# Patient Record
Sex: FEMALE | Race: WHITE | NOT HISPANIC OR LATINO | Employment: OTHER | URBAN - METROPOLITAN AREA
[De-identification: names, ages, dates, MRNs, and addresses within clinical notes are randomized per-mention and may not be internally consistent; named-entity substitution may affect disease eponyms.]

---

## 2017-04-28 ENCOUNTER — OFFICE VISIT (OUTPATIENT)
Dept: CARDIOLOGY | Facility: CLINIC | Age: 82
End: 2017-04-28

## 2017-04-28 VITALS
HEART RATE: 69 BPM | WEIGHT: 107 LBS | DIASTOLIC BLOOD PRESSURE: 94 MMHG | SYSTOLIC BLOOD PRESSURE: 123 MMHG | HEIGHT: 64 IN | BODY MASS INDEX: 18.27 KG/M2

## 2017-04-28 DIAGNOSIS — I25.9 IHD (ISCHEMIC HEART DISEASE): Primary | ICD-10-CM

## 2017-04-28 DIAGNOSIS — K31.84 GASTROPARESIS: ICD-10-CM

## 2017-04-28 DIAGNOSIS — R55 SYNCOPE, UNSPECIFIED SYNCOPE TYPE: ICD-10-CM

## 2017-04-28 DIAGNOSIS — I71.21 ASCENDING AORTIC ANEURYSM (HCC): ICD-10-CM

## 2017-04-28 DIAGNOSIS — K58.9 IRRITABLE BOWEL SYNDROME, UNSPECIFIED TYPE: ICD-10-CM

## 2017-04-28 DIAGNOSIS — I10 ESSENTIAL HYPERTENSION: ICD-10-CM

## 2017-04-28 DIAGNOSIS — E78.5 DYSLIPIDEMIA: ICD-10-CM

## 2017-04-28 PROCEDURE — 99213 OFFICE O/P EST LOW 20 MIN: CPT | Performed by: INTERNAL MEDICINE

## 2017-04-28 RX ORDER — CARVEDILOL 12.5 MG/1
12.5 TABLET ORAL 2 TIMES DAILY
Refills: 0 | COMMUNITY
Start: 2017-04-21 | End: 2022-11-11

## 2017-04-28 RX ORDER — FAMOTIDINE 40 MG/1
40 TABLET, FILM COATED ORAL DAILY
Refills: 0 | COMMUNITY
Start: 2017-04-24 | End: 2019-03-14

## 2017-04-28 RX ORDER — ROSUVASTATIN CALCIUM 10 MG/1
10 TABLET, COATED ORAL DAILY
Qty: 90 TABLET | Refills: 3 | Status: SHIPPED | OUTPATIENT
Start: 2017-04-28 | End: 2019-03-14
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
## 2017-08-15 ENCOUNTER — APPOINTMENT (OUTPATIENT)
Dept: CT IMAGING | Facility: HOSPITAL | Age: 82
End: 2017-08-15

## 2017-08-15 ENCOUNTER — HOSPITAL ENCOUNTER (EMERGENCY)
Facility: HOSPITAL | Age: 82
Discharge: HOME | End: 2017-08-15
Attending: EMERGENCY MEDICINE | Admitting: EMERGENCY MEDICINE

## 2017-08-15 ENCOUNTER — APPOINTMENT (OUTPATIENT)
Dept: GENERAL RADIOLOGY | Facility: HOSPITAL | Age: 82
End: 2017-08-15

## 2017-08-15 VITALS
BODY MASS INDEX: 17.75 KG/M2 | HEIGHT: 64 IN | HEART RATE: 68 BPM | DIASTOLIC BLOOD PRESSURE: 92 MMHG | OXYGEN SATURATION: 96 % | TEMPERATURE: 98 F | WEIGHT: 104 LBS | SYSTOLIC BLOOD PRESSURE: 156 MMHG | RESPIRATION RATE: 18 BRPM

## 2017-08-15 DIAGNOSIS — S09.90XA HEAD INJURY, INITIAL ENCOUNTER: ICD-10-CM

## 2017-08-15 DIAGNOSIS — S39.013A STRAIN OF PELVIS, INITIAL ENCOUNTER: Primary | ICD-10-CM

## 2017-08-15 LAB
ALBUMIN SERPL-MCNC: 3.9 G/DL (ref 3.4–4.8)
ALBUMIN/GLOB SERPL: 1.4 G/DL (ref 1.5–2.5)
ALP SERPL-CCNC: 86 U/L (ref 35–104)
ALT SERPL W P-5'-P-CCNC: 18 U/L (ref 10–36)
ANION GAP SERPL CALCULATED.3IONS-SCNC: 7.3 MMOL/L (ref 3.6–11.2)
AST SERPL-CCNC: 24 U/L (ref 10–30)
BASOPHILS # BLD AUTO: 0.03 10*3/MM3 (ref 0–0.3)
BASOPHILS NFR BLD AUTO: 0.4 % (ref 0–2)
BILIRUB SERPL-MCNC: 0.8 MG/DL (ref 0.2–1.8)
BUN BLD-MCNC: 16 MG/DL (ref 7–21)
BUN/CREAT SERPL: 22.9 (ref 7–25)
CALCIUM SPEC-SCNC: 8.9 MG/DL (ref 7.7–10)
CHLORIDE SERPL-SCNC: 104 MMOL/L (ref 99–112)
CO2 SERPL-SCNC: 23.7 MMOL/L (ref 24.3–31.9)
CREAT BLD-MCNC: 0.7 MG/DL (ref 0.43–1.29)
DEPRECATED RDW RBC AUTO: 48.2 FL (ref 37–54)
EOSINOPHIL # BLD AUTO: 0.27 10*3/MM3 (ref 0–0.7)
EOSINOPHIL NFR BLD AUTO: 3.8 % (ref 0–7)
ERYTHROCYTE [DISTWIDTH] IN BLOOD BY AUTOMATED COUNT: 14.7 % (ref 11.5–14.5)
GFR SERPL CREATININE-BSD FRML MDRD: 79 ML/MIN/1.73
GLOBULIN UR ELPH-MCNC: 2.7 GM/DL
GLUCOSE BLD-MCNC: 90 MG/DL (ref 70–110)
HCT VFR BLD AUTO: 36 % (ref 37–47)
HGB BLD-MCNC: 11.8 G/DL (ref 12–16)
IMM GRANULOCYTES # BLD: 0.04 10*3/MM3 (ref 0–0.03)
IMM GRANULOCYTES NFR BLD: 0.6 % (ref 0–0.5)
LYMPHOCYTES # BLD AUTO: 1.74 10*3/MM3 (ref 1–3)
LYMPHOCYTES NFR BLD AUTO: 24.4 % (ref 16–46)
MCH RBC QN AUTO: 30.4 PG (ref 27–33)
MCHC RBC AUTO-ENTMCNC: 32.8 G/DL (ref 33–37)
MCV RBC AUTO: 92.8 FL (ref 80–94)
MONOCYTES # BLD AUTO: 0.89 10*3/MM3 (ref 0.1–0.9)
MONOCYTES NFR BLD AUTO: 12.5 % (ref 0–12)
NEUTROPHILS # BLD AUTO: 4.16 10*3/MM3 (ref 1.4–6.5)
NEUTROPHILS NFR BLD AUTO: 58.3 % (ref 40–75)
OSMOLALITY SERPL CALC.SUM OF ELEC: 270.8 MOSM/KG (ref 273–305)
PLATELET # BLD AUTO: 249 10*3/MM3 (ref 130–400)
PMV BLD AUTO: 9.5 FL (ref 6–10)
POTASSIUM BLD-SCNC: 3.8 MMOL/L (ref 3.5–5.3)
PROT SERPL-MCNC: 6.6 G/DL (ref 6–8)
RBC # BLD AUTO: 3.88 10*6/MM3 (ref 4.2–5.4)
SODIUM BLD-SCNC: 135 MMOL/L (ref 135–153)
WBC NRBC COR # BLD: 7.13 10*3/MM3 (ref 4.5–12.5)

## 2017-08-15 PROCEDURE — 80053 COMPREHEN METABOLIC PANEL: CPT | Performed by: EMERGENCY MEDICINE

## 2017-08-15 PROCEDURE — 72125 CT NECK SPINE W/O DYE: CPT

## 2017-08-15 PROCEDURE — 99283 EMERGENCY DEPT VISIT LOW MDM: CPT

## 2017-08-15 PROCEDURE — 73502 X-RAY EXAM HIP UNI 2-3 VIEWS: CPT | Performed by: RADIOLOGY

## 2017-08-15 PROCEDURE — 72125 CT NECK SPINE W/O DYE: CPT | Performed by: RADIOLOGY

## 2017-08-15 PROCEDURE — 85025 COMPLETE CBC W/AUTO DIFF WBC: CPT | Performed by: EMERGENCY MEDICINE

## 2017-08-15 PROCEDURE — 70450 CT HEAD/BRAIN W/O DYE: CPT | Performed by: RADIOLOGY

## 2017-08-15 PROCEDURE — 73502 X-RAY EXAM HIP UNI 2-3 VIEWS: CPT

## 2017-08-15 PROCEDURE — 25010000002 ONDANSETRON PER 1 MG: Performed by: EMERGENCY MEDICINE

## 2017-08-15 PROCEDURE — 96374 THER/PROPH/DIAG INJ IV PUSH: CPT

## 2017-08-15 PROCEDURE — 70450 CT HEAD/BRAIN W/O DYE: CPT

## 2017-08-15 RX ORDER — ONDANSETRON 2 MG/ML
4 INJECTION INTRAMUSCULAR; INTRAVENOUS ONCE
Status: COMPLETED | OUTPATIENT
Start: 2017-08-15 | End: 2017-08-15

## 2017-08-15 RX ORDER — ONDANSETRON 4 MG/1
4 TABLET, ORALLY DISINTEGRATING ORAL ONCE
Status: DISCONTINUED | OUTPATIENT
Start: 2017-08-15 | End: 2017-08-15

## 2017-08-15 RX ADMIN — ONDANSETRON 4 MG: 2 INJECTION, SOLUTION INTRAMUSCULAR; INTRAVENOUS at 22:25

## 2017-08-16 NOTE — ED NOTES
Patient is leaving ED with patient's family at this time. Patient has no further needs or questions at discharge, patient verbalizes understanding of discharge instructions and importance of follow up care. Patient is alert and oriented x4, respirations are regular and unlabored, NADN.     Garett Narvaez RN  08/15/17 [id number]

## 2017-08-16 NOTE — ED PROVIDER NOTES
Subjective   Patient is a 87 y.o. female presenting with fall.   History provided by:  Patient  Fall   Mechanism of injury: fall    Injury location:  Pelvis  Pelvic injury location:  Pelvis and groin  Time since incident:  30 minutes  Fall:     Fall occurred:  Standing    Impact surface:  Hard floor  Prior to arrival data:     Bystander interventions:  None    Patient ambulatory at scene: no      Blood loss:  None  Associated symptoms: headaches    Associated symptoms: no abdominal pain and no chest pain        Review of Systems   Constitutional: Negative.  Negative for fever.   Respiratory: Negative.    Cardiovascular: Negative.  Negative for chest pain.   Gastrointestinal: Negative.  Negative for abdominal pain.   Endocrine: Negative.    Genitourinary: Negative.  Negative for dysuria.   Musculoskeletal:        Pain pelvis, right hip, neck pain right   Skin: Negative.    Neurological: Positive for headaches.   Psychiatric/Behavioral: Negative.    All other systems reviewed and are negative.      Past Medical History:   Diagnosis Date   • Arthritis    • Ascending aortic aneurysm 10/6/2016   • Chronic hypokalemia 10/6/2016   • Colonic polyp 10/6/2016   • Dementia    • Dyslipidemia 10/6/2016   • Gastroparesis 10/6/2016   • GERD with esophagitis 10/6/2016   • Hypertension 10/6/2016   • IBS (irritable bowel syndrome) 10/6/2016   • IHD (ischemic heart disease) 10/6/2016   • Osteoarthritis 10/6/2016   • Osteoarthritis of left hip 10/6/2016   • Osteoporosis 10/6/2016   • Parkinson's disease 10/6/2016   • Peripheral edema 10/6/2016   • Syncope 10/6/2016       Allergies   Allergen Reactions   • Penicillins Anaphylaxis   • Reglan [Metoclopramide] Anaphylaxis   • Sulfa Antibiotics Anaphylaxis   • Nexium [Esomeprazole Magnesium]        Past Surgical History:   Procedure Laterality Date   • APPENDECTOMY      As a child   • CATARACT EXTRACTION WITH INTRAOCULAR LENS IMPLANT Bilateral     2006,2008   • CORONARY ANGIOPLASTY WITH  STENT PLACEMENT  2009   • CORONARY ARTERY BYPASS GRAFT  2000    x3   • GASTRECTOMY PARTIAL / TOTAL      1970s   • LAPAROSCOPIC CHOLECYSTECTOMY  2003   • SPLENECTOMY  2007   • TONSILLECTOMY AND ADENOIDECTOMY      As a child   • TOTAL ABDOMINAL HYSTERECTOMY WITH SALPINGO OOPHORECTOMY  1973       Family History   Problem Relation Age of Onset   • Tuberculosis Mother    • No Known Problems Father        Social History     Social History   • Marital status:      Spouse name: N/A   • Number of children: N/A   • Years of education: N/A     Social History Main Topics   • Smoking status: Former Smoker     Types: Cigarettes     Quit date: 4/28/1992   • Smokeless tobacco: Never Used   • Alcohol use No   • Drug use: No   • Sexual activity: Defer     Other Topics Concern   • None     Social History Narrative           Objective   Physical Exam   Constitutional: She is oriented to person, place, and time. She appears well-developed and well-nourished. No distress.   HENT:   Head: Normocephalic and atraumatic.   Right Ear: External ear normal.   Left Ear: External ear normal.   Nose: Nose normal.   Eyes: Conjunctivae and EOM are normal. Pupils are equal, round, and reactive to light.   Neck: Normal range of motion. Neck supple. No JVD present. No tracheal deviation present.   Cardiovascular: Normal rate, regular rhythm and normal heart sounds.    No murmur heard.  Pulmonary/Chest: Effort normal and breath sounds normal. No respiratory distress. She has no wheezes.   Abdominal: Soft. Bowel sounds are normal. There is no tenderness.   Musculoskeletal: Normal range of motion. She exhibits no edema or deformity.   Tender right hip, tender symphysis pubis, tender right parietal scalp, neck   Neurological: She is alert and oriented to person, place, and time. No cranial nerve deficit.   Skin: Skin is warm and dry. No rash noted. She is not diaphoretic. No erythema. No pallor.   Psychiatric: She has a normal mood and affect. Her  behavior is normal. Thought content normal.   Nursing note and vitals reviewed.      Procedures         ED Course  ED Course   Comment By Time   Ct head negative, CT c spine no fx Celestino Ram MD 08/15 2242                  [facility]    Final diagnoses:   Strain of pelvis, initial encounter   Head injury, initial encounter            Celestino Ram MD  08/15/17 [id number]

## 2017-11-10 ENCOUNTER — OFFICE VISIT (OUTPATIENT)
Dept: CARDIOLOGY | Facility: CLINIC | Age: 82
End: 2017-11-10

## 2017-11-10 VITALS
SYSTOLIC BLOOD PRESSURE: 138 MMHG | WEIGHT: 114 LBS | HEART RATE: 77 BPM | DIASTOLIC BLOOD PRESSURE: 72 MMHG | HEIGHT: 64 IN | BODY MASS INDEX: 19.46 KG/M2

## 2017-11-10 DIAGNOSIS — I10 ESSENTIAL HYPERTENSION: ICD-10-CM

## 2017-11-10 DIAGNOSIS — E78.2 MIXED HYPERLIPIDEMIA: ICD-10-CM

## 2017-11-10 DIAGNOSIS — I25.9 IHD (ISCHEMIC HEART DISEASE): Primary | ICD-10-CM

## 2017-11-10 PROCEDURE — 99214 OFFICE O/P EST MOD 30 MIN: CPT | Performed by: INTERNAL MEDICINE

## 2017-11-10 RX ORDER — NAPROXEN SODIUM 220 MG
220 TABLET ORAL ONCE AS NEEDED
COMMUNITY
End: 2017-11-19 | Stop reason: HOSPADM

## 2017-11-10 RX ORDER — ISOSORBIDE MONONITRATE 30 MG/1
30 TABLET, EXTENDED RELEASE ORAL DAILY
Qty: 30 TABLET | Refills: 11 | Status: ON HOLD | OUTPATIENT
Start: 2017-11-10 | End: 2017-11-19
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
## 2017-11-17 NOTE — NURSING NOTE
ACC REVIEW REPORT: [facility]        PATIENT NAME: Marilyn Sood    PATIENT ID: [id number]    BED: N614    BED TYPE: Tele    BED GIVEN TO: Adali    TOÑO BED GIVEN: 1817    YOB: 1930    AGE: 88 yo    GENDER: Female    PREVIOUS ADMIT TO Located within [facility]:     PREVIOUS ADMISSION DATE:     PATIENT CLASS:     TODAY'S DATE: 11/17/2017    TRANSFER DATE: 11/17/2017    ETA:     TRANSFERRING FACILITY: [facility]    TRANSFERRING FACILITY PHONE # : [phone]    TRANSFERRING MD:     DATE/TIME REQUEST RECEIVED: 11/17/2017 at 5963    Located within [facility] RN: Ana Cristina Gunderson RN     REPORT FROM: Adali Brooke RN    TIME REPORT TAKEN: 1821    DIAGNOSIS: Chest Pain    REASON FOR TRANSFER TO Located within [facility]: Higher Level of Care    TRANSPORTATION: Ambulance    CLINICAL REASON FOR TRANSFER TO Located within [facility]: Patient presents to local ED with complaints of intermittent chest pain that has progressively gotten worse.  Also complained of nausea and SOA.       CLINICAL INFORMATION    HEIGHT:     WEIGHT:     ALLERGIES: Reglan, PCN, Sulfa    PLUMMER:     INFECTIOUS DISEASE: None    ISOLATION:     1ST VITAL SIGNS:   TIME:   TEMP:   PULSE:   B/P:   RESP:     LAST VITAL SIGNS:  TIME:   TEMP: Afebrile  PULSE: 62  B/P: 146/87  RESP: 18    LAB INFORMATION: H/H-10.9/33.1, BUN-14, Crea-0.5, Na+-133, WBC-5.3, CPK-64    CULTURE INFORMATION:     MEDS/IV FLUIDS: See MAR sent with patient. Has a #20 RFA-NS at 75 ml/hr. Received Nitro, Asa, 1 inch Nitro paste      CARDIAC SYSTEM:    CHEST PAIN: Yes. Patient wont describe the pain    RATE:     SCALE:     RHYTHM: SR    Is patient taking or has patient been given any drugs that could increase bleeding? Yes  (Plavix, Brilinta, Effient, Eliquis, Xarelto, Warfarin, Integrilin, Angiomax)    DRUG: Plavix     DOSE/FREQUENCY: 75 mg daily    CARDIAC ENZYMES:    DATE:   TIME:   CK:   CKMB:   QUAN:   TROP:     DATE: 11/17/2017  TIME:   CK:   CKMB: 1.37  QUAN:   TROP: <0.3      HEART CATH:     HEART CATH DATE:     HEART CATH RESULTS:      LAD:   RCA:   CX:   LMAIN:   EF:     SWAN:     SITE:   SIZE:    DATE INSERTED:     ARTLINE:     SITE:   SIZE:   DATE INSERTED:     SHEATH:    SITE:   SIZE:   DATE INSERTED:         VASOSEAL:    SITE:   DATE INSERTED:     EXTERNAL PACEMAKER:     RATE:   EXT PACER ON:     MODE:    DATE INSERTED:   OUTPUT SETTING:   SENSITIVITY SETTING:   SENSITIVITY TYPE:     IABP:    SITE:   AUG PRESSURE:   DATE INSERTED:     CARDIAC NOTES:       RESPIRATORY SYSTEM:    LUNG SOUNDS:    CLEAR: Yes  CRACKLES:   WHEEZES:   RHONCHI:   DIMINISHED:     ABG DATE:         ABG TIME:     ABG RESULTS:    PH:   PO2:   PCO2:   HCO3:   O2 SAT:       ETT:     ETT SIZE:     ORAL:     NASAL:     SECURED AT MEASUREMENT (CM):     ON VENTILATOR:    TV:   FI02:   RATE:   PEEP:     OXYGEN: 2 liters    O2 SAT: 99%    ADMINISTRATION ROUTE: Nasal cannula    IMAGING FINDINGS:     PNEUMO LOCATION:     PNEUMO SIZE:     PNEUMO CHEST TUBE SEAL TYPE:     RADIOLOGY RESULTS:     RESPIRATORY STATUS: Patient in no acute distress      CNS/MUSCULOSKELETAL    ALERT AND ORIENTED:    PERSON: Yes  PLACE: Yes  TIME: Yes    INJURY:  WHERE:     PALOMA COMA SCALE:    E:   M:   V:     STROKE SCALE:     SIZE OF HEMORRHAGE:     SYMPTOMS: (CHOOSE APPROPRIATE)    ASPHASIA:   ATAXIA:   DYSARTHRIA:   DYSPHASIA:   HEADACHE:   PARALYSIS:   SEIZURE:   SYNCOPE:   VERTIGO:   VISION CHANGE:        EXTREMITY WEAKNESS:    LEFT ARM:   RIGHT ARM:   LEFT LEG:   RIGHT LEG:     CAT SCAN RESULTS:     MRI RESULTS:     CNS/MUSCULOSKELETAL NOTES: Patient ambulatory. Has mild confusion. Patient states she fell a couple of weeks ago fracturing her pelvis.      GI//GY      ABDOMINAL PAIN:     VOMITING:     DIARRHEA:     NAUSEA:     BOWEL SOUNDS:     OCCULT STOOL:     VAGINAL BLEEDING:     TESTICULAR PAIN:     HEMATURIA:     NG TUBE:    SIZE:   DATE INSERTED:       ULTRASOUND:     ULTRASOUND RESULTS:      ACUTE ABDOMEN:     ACUTE ABDOMEN RESULTS:       CT SCAN:     CT SCAN RESULTS:       GI//GY  NOTES:     PAST MEDICAL HISTORY: Depression, CAD, HTN, high cholesterol, hypothyroidism, IBS, CABG, Cardiac stents, Dementia, DM, Cataract surgery, Reflux, Arthritis, Appy, Hysterectomy, Carpel Tunnel surgery, Spleenectomy, Falls    OTHER SYMPTOM NOTES:     ADDITIONAL NOTES:           Ana Cristina Gunderson RN  11/17/2017  6:41 PM

## 2017-11-18 ENCOUNTER — HOSPITAL ENCOUNTER (OUTPATIENT)
Facility: HOSPITAL | Age: 82
Setting detail: OBSERVATION
Discharge: HOME | End: 2017-11-19
Attending: INTERNAL MEDICINE | Admitting: INTERNAL MEDICINE

## 2017-11-18 PROBLEM — I16.0 HYPERTENSIVE URGENCY: Status: ACTIVE | Noted: 2017-11-18

## 2017-11-18 LAB
ALBUMIN SERPL-MCNC: 3.8 G/DL (ref 3.2–4.8)
ALBUMIN/GLOB SERPL: 1.6 G/DL (ref 1.5–2.5)
ALP SERPL-CCNC: 94 U/L (ref 25–100)
ALT SERPL W P-5'-P-CCNC: 10 U/L (ref 7–40)
ANION GAP SERPL CALCULATED.3IONS-SCNC: 6 MMOL/L (ref 3–11)
APTT PPP: 29.1 SECONDS (ref 24–31)
AST SERPL-CCNC: 17 U/L (ref 0–33)
BASOPHILS # BLD AUTO: 0.04 10*3/MM3 (ref 0–0.2)
BASOPHILS NFR BLD AUTO: 0.7 % (ref 0–1)
BILIRUB SERPL-MCNC: 1 MG/DL (ref 0.3–1.2)
BNP SERPL-MCNC: 139 PG/ML (ref 0–100)
BUN BLD-MCNC: 12 MG/DL (ref 9–23)
BUN/CREAT SERPL: 20 (ref 7–25)
CALCIUM SPEC-SCNC: 8.8 MG/DL (ref 8.7–10.4)
CHLORIDE SERPL-SCNC: 101 MMOL/L (ref 99–109)
CO2 SERPL-SCNC: 28 MMOL/L (ref 20–31)
CREAT BLD-MCNC: 0.6 MG/DL (ref 0.6–1.3)
DEPRECATED RDW RBC AUTO: 52.5 FL (ref 37–54)
EOSINOPHIL # BLD AUTO: 0.28 10*3/MM3 (ref 0–0.3)
EOSINOPHIL NFR BLD AUTO: 5 % (ref 0–3)
ERYTHROCYTE [DISTWIDTH] IN BLOOD BY AUTOMATED COUNT: 15.4 % (ref 11.3–14.5)
GFR SERPL CREATININE-BSD FRML MDRD: 95 ML/MIN/1.73
GLOBULIN UR ELPH-MCNC: 2.4 GM/DL
GLUCOSE BLD-MCNC: 96 MG/DL (ref 70–100)
HCT VFR BLD AUTO: 35.7 % (ref 34.5–44)
HGB BLD-MCNC: 11.3 G/DL (ref 11.5–15.5)
IMM GRANULOCYTES # BLD: 0.01 10*3/MM3 (ref 0–0.03)
IMM GRANULOCYTES NFR BLD: 0.2 % (ref 0–0.6)
INR PPP: 1.21
LYMPHOCYTES # BLD AUTO: 1.98 10*3/MM3 (ref 0.6–4.8)
LYMPHOCYTES NFR BLD AUTO: 35.4 % (ref 24–44)
MAGNESIUM SERPL-MCNC: 2 MG/DL (ref 1.3–2.7)
MCH RBC QN AUTO: 29.4 PG (ref 27–31)
MCHC RBC AUTO-ENTMCNC: 31.7 G/DL (ref 32–36)
MCV RBC AUTO: 93 FL (ref 80–99)
MONOCYTES # BLD AUTO: 0.75 10*3/MM3 (ref 0–1)
MONOCYTES NFR BLD AUTO: 13.4 % (ref 0–12)
NEUTROPHILS # BLD AUTO: 2.53 10*3/MM3 (ref 1.5–8.3)
NEUTROPHILS NFR BLD AUTO: 45.3 % (ref 41–71)
PLATELET # BLD AUTO: 260 10*3/MM3 (ref 150–450)
PMV BLD AUTO: 10 FL (ref 6–12)
POTASSIUM BLD-SCNC: 3.7 MMOL/L (ref 3.5–5.5)
PROT SERPL-MCNC: 6.2 G/DL (ref 5.7–8.2)
PROTHROMBIN TIME: 13.3 SECONDS (ref 9.6–11.5)
RBC # BLD AUTO: 3.84 10*6/MM3 (ref 3.89–5.14)
SODIUM BLD-SCNC: 135 MMOL/L (ref 132–146)
TROPONIN I SERPL-MCNC: <0.006 NG/ML
WBC NRBC COR # BLD: 5.59 10*3/MM3 (ref 3.5–10.8)

## 2017-11-18 PROCEDURE — 85730 THROMBOPLASTIN TIME PARTIAL: CPT | Performed by: INTERNAL MEDICINE

## 2017-11-18 PROCEDURE — 93010 ELECTROCARDIOGRAM REPORT: CPT | Performed by: INTERNAL MEDICINE

## 2017-11-18 PROCEDURE — 25010000002 HEPARIN (PORCINE) PER 1000 UNITS: Performed by: INTERNAL MEDICINE

## 2017-11-18 PROCEDURE — G0378 HOSPITAL OBSERVATION PER HR: HCPCS

## 2017-11-18 PROCEDURE — 80053 COMPREHEN METABOLIC PANEL: CPT | Performed by: INTERNAL MEDICINE

## 2017-11-18 PROCEDURE — 84484 ASSAY OF TROPONIN QUANT: CPT | Performed by: INTERNAL MEDICINE

## 2017-11-18 PROCEDURE — 85025 COMPLETE CBC W/AUTO DIFF WBC: CPT | Performed by: INTERNAL MEDICINE

## 2017-11-18 PROCEDURE — 93005 ELECTROCARDIOGRAM TRACING: CPT | Performed by: INTERNAL MEDICINE

## 2017-11-18 PROCEDURE — 83735 ASSAY OF MAGNESIUM: CPT | Performed by: INTERNAL MEDICINE

## 2017-11-18 PROCEDURE — 83880 ASSAY OF NATRIURETIC PEPTIDE: CPT | Performed by: INTERNAL MEDICINE

## 2017-11-18 PROCEDURE — 99213 OFFICE O/P EST LOW 20 MIN: CPT | Performed by: INTERNAL MEDICINE

## 2017-11-18 PROCEDURE — 99220 PR INITIAL OBSERVATION CARE/DAY 70 MINUTES: CPT | Performed by: INTERNAL MEDICINE

## 2017-11-18 PROCEDURE — 85610 PROTHROMBIN TIME: CPT | Performed by: INTERNAL MEDICINE

## 2017-11-18 PROCEDURE — 96372 THER/PROPH/DIAG INJ SC/IM: CPT

## 2017-11-18 RX ORDER — HYDRALAZINE HYDROCHLORIDE 10 MG/1
5 TABLET, FILM COATED ORAL EVERY 6 HOURS PRN
Status: DISCONTINUED | OUTPATIENT
Start: 2017-11-18 | End: 2017-11-19 | Stop reason: HOSPADM

## 2017-11-18 RX ORDER — HEPARIN SODIUM 5000 [USP'U]/ML
5000 INJECTION, SOLUTION INTRAVENOUS; SUBCUTANEOUS EVERY 8 HOURS SCHEDULED
Status: DISCONTINUED | OUTPATIENT
Start: 2017-11-18 | End: 2017-11-19 | Stop reason: HOSPADM

## 2017-11-18 RX ORDER — ROSUVASTATIN CALCIUM 10 MG/1
10 TABLET, COATED ORAL NIGHTLY
Status: DISCONTINUED | OUTPATIENT
Start: 2017-11-18 | End: 2017-11-19 | Stop reason: HOSPADM

## 2017-11-18 RX ORDER — ACETAMINOPHEN 325 MG/1
650 TABLET ORAL EVERY 6 HOURS PRN
Status: DISCONTINUED | OUTPATIENT
Start: 2017-11-18 | End: 2017-11-19 | Stop reason: HOSPADM

## 2017-11-18 RX ORDER — DOCUSATE SODIUM 100 MG/1
100 CAPSULE, LIQUID FILLED ORAL 2 TIMES DAILY
COMMUNITY
End: 2022-11-11

## 2017-11-18 RX ORDER — CARVEDILOL 12.5 MG/1
12.5 TABLET ORAL 2 TIMES DAILY
Status: DISCONTINUED | OUTPATIENT
Start: 2017-11-18 | End: 2017-11-19 | Stop reason: HOSPADM

## 2017-11-18 RX ORDER — ROSUVASTATIN CALCIUM 10 MG/1
10 TABLET, COATED ORAL DAILY
Status: DISCONTINUED | OUTPATIENT
Start: 2017-11-18 | End: 2017-11-18

## 2017-11-18 RX ORDER — FAMOTIDINE 20 MG/1
40 TABLET, FILM COATED ORAL DAILY
Status: DISCONTINUED | OUTPATIENT
Start: 2017-11-18 | End: 2017-11-19 | Stop reason: HOSPADM

## 2017-11-18 RX ORDER — DOCUSATE SODIUM 100 MG/1
100 CAPSULE, LIQUID FILLED ORAL 2 TIMES DAILY
Status: DISCONTINUED | OUTPATIENT
Start: 2017-11-18 | End: 2017-11-19 | Stop reason: HOSPADM

## 2017-11-18 RX ORDER — CLOPIDOGREL BISULFATE 75 MG/1
75 TABLET ORAL NIGHTLY
Status: DISCONTINUED | OUTPATIENT
Start: 2017-11-18 | End: 2017-11-19 | Stop reason: HOSPADM

## 2017-11-18 RX ORDER — SODIUM CHLORIDE 0.9 % (FLUSH) 0.9 %
1-10 SYRINGE (ML) INJECTION AS NEEDED
Status: DISCONTINUED | OUTPATIENT
Start: 2017-11-18 | End: 2017-11-19 | Stop reason: HOSPADM

## 2017-11-18 RX ORDER — ISOSORBIDE MONONITRATE 60 MG/1
120 TABLET, EXTENDED RELEASE ORAL DAILY
Status: DISCONTINUED | OUTPATIENT
Start: 2017-11-19 | End: 2017-11-19 | Stop reason: HOSPADM

## 2017-11-18 RX ORDER — ISOSORBIDE MONONITRATE 30 MG/1
30 TABLET, EXTENDED RELEASE ORAL DAILY
Status: DISCONTINUED | OUTPATIENT
Start: 2017-11-18 | End: 2017-11-18

## 2017-11-18 RX ORDER — CLOPIDOGREL BISULFATE 75 MG/1
75 TABLET ORAL DAILY
Status: DISCONTINUED | OUTPATIENT
Start: 2017-11-18 | End: 2017-11-18

## 2017-11-18 RX ADMIN — ROSUVASTATIN CALCIUM 10 MG: 10 TABLET, FILM COATED ORAL at 20:15

## 2017-11-18 RX ADMIN — CLOPIDOGREL BISULFATE 75 MG: 75 TABLET ORAL at 20:15

## 2017-11-18 RX ADMIN — FAMOTIDINE 40 MG: 20 TABLET, FILM COATED ORAL at 09:08

## 2017-11-18 RX ADMIN — HEPARIN SODIUM 5000 UNITS: 5000 INJECTION, SOLUTION INTRAVENOUS; SUBCUTANEOUS at 20:16

## 2017-11-18 RX ADMIN — ISOSORBIDE MONONITRATE 30 MG: 30 TABLET, EXTENDED RELEASE ORAL at 09:11

## 2017-11-18 RX ADMIN — ACETAMINOPHEN 650 MG: 325 TABLET ORAL at 13:43

## 2017-11-18 RX ADMIN — HEPARIN SODIUM 5000 UNITS: 5000 INJECTION, SOLUTION INTRAVENOUS; SUBCUTANEOUS at 13:44

## 2017-11-18 RX ADMIN — CARVEDILOL 12.5 MG: 12.5 TABLET, FILM COATED ORAL at 09:11

## 2017-11-18 RX ADMIN — CARVEDILOL 12.5 MG: 12.5 TABLET, FILM COATED ORAL at 18:14

## 2017-11-18 RX ADMIN — SERTRALINE HYDROCHLORIDE 25 MG: 50 TABLET ORAL at 09:08

## 2017-11-18 NOTE — H&P
[facility]  HISTORY AND PHYSICAL    Patient Name: Marilyn Sood  : 1930  MRN: [id number]  Primary Care Physician: Jarod Burnett MD    Subjective   Subjective     Chief Complaint: Chest pain    HPI:  Marilyn Sood is a 87 y.o. female with CAD s/p CABG in , coronary stent , ascending aortic aneurysm presenting with left sided chest pain present both at rest and with exertion.  She apparently reported squeezing chest pain intermittently to Dr. Cruz at her last clinic visit about a week ago and was started on IMDUR which she has not started taking yet.  Yesterday morning around 10am she developed left sided chest pain which she describes as severe.  She is not able to further characterize the nature of the pain.  She had improvement in the pain in her local ED after multiple SL nitro and ultimately nitro paste.  Cardiac enzymes were negative there, EKG without acute ischemic changes and chest X-ray showed nothing acute.  She requested transfer here for evaluation due to her outpatient providers being here.  BP elevated on arrival as high as 202/104.    Review of Systems   Gen- No fevers, chills  CV- Chest pain as per HPI, no palpitations  Resp- No cough, dyspnea  GI- No N/V/D, abd pain    Otherwise 10-system ROS reviewed and is negative except as mentioned in the HPI.    Personal History     Past Medical History:   Diagnosis Date   • Arthritis    • Ascending aortic aneurysm 10/6/2016   • Chronic hypokalemia 10/6/2016   • Colonic polyp 10/6/2016   • Dementia    • Dyslipidemia 10/6/2016   • Gastroparesis 10/6/2016   • GERD with esophagitis 10/6/2016   • Hypertension 10/6/2016   • IBS (irritable bowel syndrome) 10/6/2016   • IHD (ischemic heart disease) 10/6/2016   • Osteoarthritis 10/6/2016   • Osteoarthritis of left hip 10/6/2016   • Osteoporosis 10/6/2016   • Parkinson's disease 10/6/2016   • Peripheral edema 10/6/2016   • Syncope 10/6/2016        Past Surgical History:   Procedure Laterality Date   • APPENDECTOMY      As a child   • CATARACT EXTRACTION WITH INTRAOCULAR LENS IMPLANT Bilateral     2006,2008   • CORONARY ANGIOPLASTY WITH STENT PLACEMENT  2009   • CORONARY ARTERY BYPASS GRAFT  2000    x3   • GASTRECTOMY PARTIAL / TOTAL      1970s   • LAPAROSCOPIC CHOLECYSTECTOMY  2003   • SPLENECTOMY  2007   • TONSILLECTOMY AND ADENOIDECTOMY      As a child   • TOTAL ABDOMINAL HYSTERECTOMY WITH SALPINGO OOPHORECTOMY  1973       Family History: family history includes No Known Problems in her father; Tuberculosis in her mother.     Social History:  reports that she quit smoking about 25 years ago. Her smoking use included Cigarettes. She has never used smokeless tobacco. She reports that she does not drink alcohol or use illicit drugs.    Medications:  Prescriptions Prior to Admission   Medication Sig Dispense Refill Last Dose   • docusate sodium (COLACE) 100 MG capsule Take 100 mg by mouth 2 (Two) Times a Day.      • carvedilol (COREG) 12.5 MG tablet Take 12.5 mg by mouth 2 (Two) Times a Day.  0 Taking   • clopidogrel (PLAVIX) 75 MG tablet Take 75 mg by mouth Daily.   Taking   • famotidine (PEPCID) 40 MG tablet Take 40 mg by mouth Daily.  0 Taking   • Ginger, Zingiber officinalis, (GINGER ROOT PO) Take 1 tablet by mouth Daily.   Taking   • isosorbide mononitrate (IMDUR) 30 MG 24 hr tablet Take 1 tablet by mouth Daily. 30 tablet 11    • naproxen sodium (ALEVE) 220 MG tablet Take 220 mg by mouth 1 (One) Time As Needed for Mild Pain .      • rosuvastatin (CRESTOR) 10 MG tablet Take 1 tablet by mouth Daily. 90 tablet 3 Taking   • sertraline (ZOLOFT) 25 MG tablet Take 25 mg by mouth Daily.   Taking       Allergies   Allergen Reactions   • Penicillins Anaphylaxis   • Reglan [Metoclopramide] Anaphylaxis   • Sulfa Antibiotics Anaphylaxis   • Nexium [Esomeprazole Magnesium]        Objective   Objective     Vital Signs:   Temp:  [97.4 °F (36.3 °C)-98.6 °F (37 °C)]  98.6 °F (37 °C)  Heart Rate:  [58-74] 62  Resp:  [16-20] 16  BP: (110-202)/() 115/76        Physical Exam   Constitutional: No acute distress, awake, alert, sitting up in bed  Eyes: PERRLA, sclerae anicteric, no conjunctival injection  HENT: NCAT, mucous membranes moist  Neck: Supple, trachea midline  Respiratory: Clear to auscultation bilaterally, nonlabored respirations   Cardiovascular: RRR, no murmurs, rubs, or gallops, palpable and symmetric radial pulses bilaterally  Gastrointestinal: Positive bowel sounds, soft, nontender, nondistended  Musculoskeletal: No bilateral ankle edema, no clubbing or cyanosis to extremities, no chest wall tenderness  Psychiatric: Appropriate affect, cooperative  Neurologic: Oriented x 3, Cranial Nerves grossly intact to confrontation, speech clear  Skin: No rashes    Results Reviewed:  I have personally reviewed current lab, radiology, and data and agree.    EKG personally reviewed by me reveals NSR without acute ischemic changes.      Results from last 7 days  Lab Units 11/18/17  0436   WBC 10*3/mm3 5.59   HEMOGLOBIN g/dL 11.3*   HEMATOCRIT % 35.7   PLATELETS 10*3/mm3 260   INR  1.21       Results from last 7 days  Lab Units 11/18/17  0436   SODIUM mmol/L 135   POTASSIUM mmol/L 3.7   CHLORIDE mmol/L 101   CO2 mmol/L 28.0   BUN mg/dL 12   CREATININE mg/dL 0.60   GLUCOSE mg/dL 96   CALCIUM mg/dL 8.8   ALT (SGPT) U/L 10   AST (SGOT) U/L 17   TROPONIN I ng/mL <0.006     BNP   Date Value Ref Range Status   11/18/2017 139.0 (H) 0.0 - 100.0 pg/mL Final     No results found for: PHART  Imaging Results (last 24 hours)     ** No results found for the last 24 hours. **             Assessment/Plan   Assessment / Plan     Hospital Problem List     IHD (ischemic heart disease)    Overview Addendum 10/6/2016  9:05 AM by Zakia mckeon Severe onset of disabling chest pain with left heart catheterization at [facility] with transfer to [facility] and undergoing left  heart catheterization by Dr. Nader Hernandez with surgical consultation and coronary artery bypass grafting x3: July 2000, Dr. Carson Landeros.    b. Multiple myocardial perfusion studies; data deficit, [id number], [place].   c. Cardiolite GXT negative for stress-induced ischemia with moderate impairment of exercise intolerance and normal left ventricular systolic function and wall motion with normal LV function (0.77), January 2008, [facility].  d. Diagnostic coronary arteriography for severe disabling chest pain with PTCA and XIENCE stent placement to the circumflex, 2009, [facility], Dr. Ronquillo.   e. Apparent acceptable echocardiogram; data deficit, February 2009.    f. Recurrent disabling chest pain syndrome with 3-vessel obstructive coronary disease and occluded proximal segments LDA and RCA with patent SVG to PDA and LIMA/LAD with acceptable LV function, [facility], January 2010.  g. Recurrent chest pain with ER visit at [facility] with negative enzymes and no change in EKG, 03/21/2011.    h. Remote recurrent [facility] ED evaluation, essentially with subsequent 5-day hospitalization and apparent acceptable telemetry myocardial perfusion study and echocardiogram with EGD demonstrating enlarged bezoar - data deficient, September-October, 2011.  i. Recurrent CCS class II-III chest pain syndrome with NYHA class II-III dyspnea with presentation to [facility].  Negative troponin and serial EKGs with subsequent transfer and Lexiscan Cardiolite study except for a small area of moderate anteroapical ischemia with preserved left ventricular function and acceptable echocardiogram with mild to moderate AI and mild MR.  Continue medical therapy, August 2013.  j. Residual CCS class I angina pectoris/NYHA class I to II exertional dyspnea and fatigue syndrome with abnormal acceptable Lexiscan study with small size moderate  anterior apical ischemia with preserved systolic left ventricular function and abnormal acceptable echocardiogram demonstrating mild to moderate diastolic dysfunction with mild to moderate AR and mild MR with continued medical therapy felt warranted, August 2013.  k. Residual class I symptoms.      l. Intermittent ED evaluations for atypical chest pain, tachypalpitations and hypertensive blood pressure readings; data deficit, with repeat [facility] emergency department visit for elevated blood pressure and weakness, September 2014, data deficit.          GERD with esophagitis    Overview Signed 10/6/2016  9:01 AM by Zakia Mustafa     2. GERD with erosive esophagitis, March 2009.           Mixed hyperlipidemia    Hypertensive urgency            Assessment & Plan:  87 year old female with CAD s/p CABG and coronary stents presenting with atypical chest pain and hypertensive urgency.    CAD  Atypical chest pain  -Cardiac enzymes negative and EKG without acute ischemic changes; do not think this is ACS  -Outside CXR negative for acute process  -Continue plavix, crestor  -Start IMDUR 120mg daily    Hypertensive urgency  -BP improved  -Continue carvedilol  -Start IMDUR 120mg daily    GERD  -Continue pepcid  -Given history of esophagitis, if chest pain persists, may consider starting PPI    DVT prophylaxis: [facility]    CODE STATUS:  Full Code    Admission Status:  I believe this patient meets OBSERVATION status, however if further evaluation or treatment plans warrant, status may change.  Based upon current information, I predict patient's care encounter to be less than or equal to 2 midnights.    Maia Cisneros MD   11/18/17   8:33 PM

## 2017-11-18 NOTE — PLAN OF CARE
Problem: Patient Care Overview (Adult)  Goal: Plan of Care Review  Outcome: Ongoing (interventions implemented as appropriate)    11/18/17 0328   Coping/Psychosocial Response Interventions   Plan Of Care Reviewed With patient;daughter   Patient Care Overview   Progress no change   Outcome Evaluation   Outcome Summary/Follow up Plan Pt arrived from [place] no c/o chest pain currently         Problem: Arrhythmia/Dysrhythmia (Symptomatic) (Adult)  Goal: Signs and Symptoms of Listed Potential Problems Will be Absent or Manageable (Arrhythmia/Dysrhythmia)  Outcome: Ongoing (interventions implemented as appropriate)    Problem: Fall Risk (Adult)  Goal: Absence of Falls  Outcome: Ongoing (interventions implemented as appropriate)

## 2017-11-18 NOTE — PLAN OF CARE
Problem: Patient Care Overview (Adult)  Goal: Plan of Care Review  Outcome: Ongoing (interventions implemented as appropriate)    11/18/17 7660   Coping/Psychosocial Response Interventions   Plan Of Care Reviewed With patient   Patient Care Overview   Progress improving   Outcome Evaluation   Outcome Summary/Follow up Plan VSS. Pt has had no c/o of CP or SOA at this time. Possible DC tomorrow.

## 2017-11-18 NOTE — PROGRESS NOTES
"Marilyn Sood  [id number]  4/14/1930   LOS: 0 days   Patient Care Team:  Jarod Burnett MD as PCP - General  Jarod Burnett MD as PCP - Family Medicine  Jeffy Quan MD as PCP - Claims Attributed    Chief Complaint:  CHEST PAIN    Subjective     Currently comfortable and asymptomatic eating breakfast.  The patient's daughter provides history and states that she has been \"weak and tired all week\" and unable to tolerate significant activity.  She apparently did get her Imdur filled but has been only taken it for one to 2 days.  She had prolonged \"severe\" vague primary left precordial and left axillary chest pain yesterday without associated discomfort in the retrosternal area, upper neck or back, or arms and presented to [facility] with exclusion of myocardial necrosis.  She requested transfer to [facility] which was accomplished this morning.  No current complaints.    Objective     Vital Sign Min/Max for last 24 hours  Temp  Min: 97.4 °F (36.3 °C)  Max: 97.6 °F (36.4 °C)   BP  Min: 147/81  Max: 202/104   Pulse  Min: 58  Max: 74   Resp  Min: 20  Max: 20   SpO2  Min: 94 %  Max: 94 %      Weight  Min: 112 lb 8 oz (51 kg)  Max: 112 lb 8 oz (51 kg)     Last 2 weights    11/18/17  0328   Weight: 112 lb 8 oz (51 kg)         Intake/Output Summary (Last 24 hours) at 11/18/17 0924  Last data filed at 11/18/17 0820   Gross per 24 hour   Intake                0 ml   Output             1500 ml   Net            -1500 ml       Physical Exam:     General Appearance:    Alert, cooperative, in no acute distress   Lungs:     Clear to auscultation,respirations regular, even and                   unlabored    Heart:    Regular and normal rate, normal S1 and S2, grade 1/6            murmur, no gallop, no rub, no click   Abdomen:  Extremities:   Soft, non-tender, bowel sounds audible x4    No edema, normal range of motion   Pulses:   Pulses palpable and equal bilaterally      Results Review:     Results " from last 7 days  Lab Units 11/18/17  0436   SODIUM mmol/L 135   POTASSIUM mmol/L 3.7   CHLORIDE mmol/L 101   CO2 mmol/L 28.0   BUN mg/dL 12   CREATININE mg/dL 0.60   GLUCOSE mg/dL 96   CALCIUM mg/dL 8.8       Results from last 7 days  Lab Units 11/18/17  0436   WBC 10*3/mm3 5.59   HEMOGLOBIN g/dL 11.3*   HEMATOCRIT % 35.7   PLATELETS 10*3/mm3 260     ·   NO CXR.    ·   EKG: Sinus rhythm with incomplete right bundle branch block in the absence of acute ST-T changes, LVH, or definitive scar.    Medication Review: REVIEWED    Assessment/Plan     Significant hypertension on transfer; currently somewhat improved.  I do not believe at this time she has acute coronary syndrome.  Would recommend DVT prophylaxis with Lovenox 40 mg subcutaneous daily and increase Imdur empirically to 120 mg daily and add amlodipine/benazepril 2.5/10 if hypertension persists.  Would defer MPS/LHC at this time.  Currently I'm unsure of her presenting chest pain etiology.    Discussed with patient, daughter, and granddaughter in room.    Active Problems:    Hypertensive urgency        11/18/17  9:24 AM

## 2017-11-19 VITALS
SYSTOLIC BLOOD PRESSURE: 127 MMHG | HEART RATE: 60 BPM | RESPIRATION RATE: 20 BRPM | HEIGHT: 64 IN | TEMPERATURE: 98.2 F | DIASTOLIC BLOOD PRESSURE: 80 MMHG | WEIGHT: 112.5 LBS | BODY MASS INDEX: 19.21 KG/M2 | OXYGEN SATURATION: 96 %

## 2017-11-19 LAB — TROPONIN I SERPL-MCNC: <0.006 NG/ML

## 2017-11-19 PROCEDURE — 96372 THER/PROPH/DIAG INJ SC/IM: CPT

## 2017-11-19 PROCEDURE — G0378 HOSPITAL OBSERVATION PER HR: HCPCS

## 2017-11-19 PROCEDURE — 25010000002 ONDANSETRON PER 1 MG: Performed by: NURSE PRACTITIONER

## 2017-11-19 PROCEDURE — 25010000002 HEPARIN (PORCINE) PER 1000 UNITS: Performed by: INTERNAL MEDICINE

## 2017-11-19 PROCEDURE — 93010 ELECTROCARDIOGRAM REPORT: CPT | Performed by: INTERNAL MEDICINE

## 2017-11-19 PROCEDURE — 93005 ELECTROCARDIOGRAM TRACING: CPT | Performed by: INTERNAL MEDICINE

## 2017-11-19 PROCEDURE — 99213 OFFICE O/P EST LOW 20 MIN: CPT | Performed by: INTERNAL MEDICINE

## 2017-11-19 PROCEDURE — 99217 PR OBSERVATION CARE DISCHARGE MANAGEMENT: CPT | Performed by: INTERNAL MEDICINE

## 2017-11-19 PROCEDURE — 84484 ASSAY OF TROPONIN QUANT: CPT | Performed by: INTERNAL MEDICINE

## 2017-11-19 PROCEDURE — 96374 THER/PROPH/DIAG INJ IV PUSH: CPT

## 2017-11-19 RX ORDER — ISOSORBIDE MONONITRATE 30 MG/1
120 TABLET, EXTENDED RELEASE ORAL DAILY
Qty: 30 TABLET | Refills: 11 | Status: SHIPPED | OUTPATIENT
Start: 2017-11-19 | End: 2019-03-14

## 2017-11-19 RX ORDER — ONDANSETRON 4 MG/1
4 TABLET, FILM COATED ORAL EVERY 6 HOURS PRN
Status: DISCONTINUED | OUTPATIENT
Start: 2017-11-19 | End: 2017-11-19 | Stop reason: HOSPADM

## 2017-11-19 RX ORDER — ACETAMINOPHEN 325 MG/1
650 TABLET ORAL EVERY 6 HOURS PRN
Start: 2017-11-19 | End: 2020-04-06

## 2017-11-19 RX ORDER — PANTOPRAZOLE SODIUM 40 MG/1
40 TABLET, DELAYED RELEASE ORAL DAILY
Qty: 28 TABLET | Refills: 0 | Status: SHIPPED | OUTPATIENT
Start: 2017-11-19 | End: 2017-12-17

## 2017-11-19 RX ORDER — ONDANSETRON 2 MG/ML
4 INJECTION INTRAMUSCULAR; INTRAVENOUS EVERY 6 HOURS PRN
Status: DISCONTINUED | OUTPATIENT
Start: 2017-11-19 | End: 2017-11-19 | Stop reason: HOSPADM

## 2017-11-19 RX ADMIN — ACETAMINOPHEN 650 MG: 325 TABLET ORAL at 11:30

## 2017-11-19 RX ADMIN — SERTRALINE HYDROCHLORIDE 25 MG: 50 TABLET ORAL at 09:16

## 2017-11-19 RX ADMIN — FAMOTIDINE 40 MG: 20 TABLET, FILM COATED ORAL at 09:15

## 2017-11-19 RX ADMIN — ISOSORBIDE MONONITRATE 120 MG: 60 TABLET, EXTENDED RELEASE ORAL at 09:15

## 2017-11-19 RX ADMIN — HEPARIN SODIUM 5000 UNITS: 5000 INJECTION, SOLUTION INTRAVENOUS; SUBCUTANEOUS at 06:23

## 2017-11-19 RX ADMIN — ONDANSETRON 4 MG: 2 INJECTION INTRAMUSCULAR; INTRAVENOUS at 04:45

## 2017-11-19 RX ADMIN — CARVEDILOL 12.5 MG: 12.5 TABLET, FILM COATED ORAL at 09:15

## 2017-11-19 NOTE — PLAN OF CARE
Problem: Patient Care Overview (Adult)  Goal: Plan of Care Review  Outcome: Ongoing (interventions implemented as appropriate)    11/19/17 0511   Coping/Psychosocial Response Interventions   Plan Of Care Reviewed With patient   Patient Care Overview   Progress improving   Outcome Evaluation   Outcome Summary/Follow up Plan VSS. No c/o chest pain. Pt woke up with nausea. No other pain or SOA. SR/SB on the monitor. fall precaution maintained. No other complains.

## 2017-11-19 NOTE — PLAN OF CARE
Problem: Patient Care Overview (Adult)  Goal: Plan of Care Review  Outcome: Outcome(s) achieved Date Met:  11/19/17 11/19/17 1034   Coping/Psychosocial Response Interventions   Plan Of Care Reviewed With patient;family;daughter   Patient Care Overview   Progress improving   Outcome Evaluation   Outcome Summary/Follow up Plan VSS. No c/o CP or SOA this AM. DC home this AM.        Goal: Adult Individualization and Mutuality  Outcome: Outcome(s) achieved Date Met:  11/19/17  Goal: Discharge Needs Assessment  Outcome: Outcome(s) achieved Date Met:  11/19/17 11/19/17 1034   Discharge Needs Assessment   Concerns To Be Addressed denies needs/concerns at this time   Readmission Within The Last 30 Days no previous admission in last 30 days   Equipment Needed After Discharge none   Discharge Disposition still a patient   Self-Care   Equipment Currently Used at Home none   Current Health   Anticipated Changes Related to Illness none   Living Environment   Transportation Available family or friend will provide         Problem: Arrhythmia/Dysrhythmia (Symptomatic) (Adult)  Goal: Signs and Symptoms of Listed Potential Problems Will be Absent or Manageable (Arrhythmia/Dysrhythmia)  Outcome: Outcome(s) achieved Date Met:  11/19/17 11/18/17 0328   Arrhythmia/Dysrhythmia (Symptomatic)   Problems Assessed (Arrhythmia/Dysrhythmia) all   Problems Present (Arrhythmia/Dysrhythmia) chest pain (angina)         Problem: Fall Risk (Adult)  Goal: Identify Related Risk Factors and Signs and Symptoms  Outcome: Outcome(s) achieved Date Met:  11/19/17  Goal: Absence of Falls  Outcome: Outcome(s) achieved Date Met:  11/19/17 11/19/17 1034   Fall Risk (Adult)   Absence of Falls achieves outcome         Problem: Hypertensive Disease/Crisis (Arterial) (Adult)  Goal: Signs and Symptoms of Listed Potential Problems Will be Absent or Manageable (Hypertensive Disease/Crisis)  Outcome: Outcome(s) achieved Date Met:  11/19/17 11/19/17 1034    Hypertensive Disease/Crisis (Arterial)   Problems Assessed (Hypertensive Disease/Crisis (Arterial)) all   Problems Present (Hypertensive Disease/Crisis (Arterial)) none

## 2017-11-19 NOTE — PROGRESS NOTES
Marilyn Sood  [id number]  4/14/1930   LOS: 0 days   Patient Care Team:  Jarod Burnett MD as PCP - General  Jarod Burnett MD as PCP - Family Medicine  Jeffy Quan MD as PCP - Claims Attributed    Chief Complaint:  CP / HTN    Subjective     Comfortable and cheerful in bed this morning.  She denies anginal type chest discomfort, increased shortness of breath, nausea, emesis, abdominal pain, headache, focal motor-sensory changes, cough, sputum production, pleurisy, or back pain.  She has been up and about room without difficulty per her history.  No current GI or  difficulty.  Finally, she is eager for discharge.    Objective     Vital Sign Min/Max for last 24 hours  Temp  Min: 97.4 °F (36.3 °C)  Max: 98.6 °F (37 °C)   BP  Min: 110/84  Max: 158/98   Pulse  Min: 58  Max: 62   Resp  Min: 16  Max: 20   SpO2  Min: 96 %  Max: 96 %   Flow (L/min)  Min: 2  Max: 2        Last 2 weights    11/18/17  0328   Weight: 112 lb 8 oz (51 kg)         Intake/Output Summary (Last 24 hours) at 11/19/17 0808  Last data filed at 11/18/17 1956   Gross per 24 hour   Intake              500 ml   Output              600 ml   Net             -100 ml       Physical Exam:     General Appearance:    Alert, cooperative, in no acute distress   Lungs:     Clear to auscultation,respirations regular, even and                   unlabored    Heart:    Regular and normal rate, normal S1 and S2, grade 1/6            murmur, no gallop, no rub, no click   Abdomen:  Extremities:   Soft, non-tender, bowel sounds audible x4    No edema, normal range of motion   Pulses:   Pulses palpable and equal bilaterally      Results Review:     Results from last 7 days  Lab Units 11/18/17  0436   SODIUM mmol/L 135   POTASSIUM mmol/L 3.7   CHLORIDE mmol/L 101   CO2 mmol/L 28.0   BUN mg/dL 12   CREATININE mg/dL 0.60   GLUCOSE mg/dL 96   CALCIUM mg/dL 8.8       Results from last 7 days  Lab Units 11/18/17  0436   WBC 10*3/mm3 5.59   HEMOGLOBIN  g/dL 11.3*   HEMATOCRIT % 35.7   PLATELETS 10*3/mm3 260     ·   NO CXR.    ·   EKG: Normal sinus rhythm with poor R-wave progression and leftward axis without acute ST-T changes or LVH; no significant change from previous tracing.    Results from last 7 days  Lab Units 11/19/17  0431 11/18/17  0436   TROPONIN I ng/mL <0.006 <0.006       Medication Review: REVIEWED    Assessment/Plan     Cardiac status appears stable and blood pressure readings are overall somewhat improved.  I would not believe at this time we need to pursue MPS/LHC in view of  her atypical symptoms and subsequent negative acceptable serial EKGs and negative cardiac markers.  Would tentatively allow home on the following cardiac medications:    · ASA 81 mg daily  · Carvedilol 12.5 mg  · Clopidogrel 75 mg daily  · Famotidine 40 mg daily  · Imdur 120 mg daily  · Rosuvastatin 10 mg daily  · NTG when necessary  · Sertraline 25 mg daily  · Amlodipine/benazepril 2.5 mg/10 mg daily  · Follow-up Dr. Burnett in one-2 weeks and return for further [facility] follow-up as scheduled; sooner when necessary.    Active Problems:    IHD (ischemic heart disease)    GERD with esophagitis    Mixed hyperlipidemia    Hypertensive urgency        11/19/17  8:08 AM

## 2017-11-20 NOTE — DISCHARGE SUMMARY
[facility] Medicine Services  DISCHARGE SUMMARY    Patient Name: Marilyn Sood  : 1930  MRN: [id number]    Date of Admission: 2017  Date of Discharge:  2017  Length of Stay: 0  Primary Care Physician: Jarod Burnett MD    Consults     No orders found for last 30 day(s).        Hospital Course     Presenting Problem:   Chest pain [R07.9]  Hypertensive urgency [I16.0]    Active Hospital Problems (** Indicates Principal Problem)    Diagnosis Date Noted   • Hypertensive urgency [I16.0] 2017   • Mixed hyperlipidemia [E78.2] 10/14/2016   • IHD (ischemic heart disease) [I25.9] 10/06/2016   • GERD with esophagitis [K21.0] 10/06/2016      Resolved Hospital Problems    Diagnosis Date Noted Date Resolved   No resolved problems to display.          Hospital Course:  Marilyn Sood is a 87 y.o. female with history of PUD s/p remote gastrectomy with subsequent gastroparesis, CAD s/p CABG and stenting who presented with severe left sided chest pain that occurred at rest.  ACS ruled out with EKG and serial negative cardiac enzymes.  She had been started on IMDUR as an outpatient a week prior due to complaining of squeezing chest pain at her outpatient cardiology visit but had not started yet.  BP elevated on arrival but subsequently well controlled after addition of IMDUR 120mg daily.  Patient evaluated by Dr. Cruz who felt that conservative management would be best in light of her atypical symptoms and negative EKG and troponins.  He recommended addition of amlodipine/benazepril 2.5mg/10mg daily if hypertension is not controlled.  She does have significant trouble with gastroparesis and acid reflux.  She will be empirically placed on PPI x 4 weeks and follow up with PCP and her gastroenterologist.  She had an episode of vomiting on the day of discharge after eating some large meals which she reported happens frequently at home.  She felt fine after the vomiting  and wanted to go home.  She was advised to eat small, frequent meals given her significant gastroparesis and she agreed.           Day of Discharge     HPI: Feels well today.  No further chest pain.    Review of Systems  Gen- No fevers, chills  CV- No chest pain, palpitations  Resp- No cough, dyspnea  GI- Vomited x 1 this morning, no abd pain    Otherwise ROS is negative except as mentioned in the HPI.    Vital Signs:   Temp:  [97.9 °F (36.6 °C)-98.2 °F (36.8 °C)] 98.2 °F (36.8 °C)  Heart Rate:  [60] 60  Resp:  [20] 20  BP: (127-138)/(80-87) 127/80     Physical Exam:  Constitutional: No acute distress, awake, alert, eating a banana   HENT: NCAT, mucous membranes moist  Respiratory: Clear to auscultation bilaterally, respiratory effort normal   Cardiovascular: RRR, no murmurs, rubs, or gallops  Gastrointestinal: Positive bowel sounds, soft, nontender, nondistended  Musculoskeletal: No bilateral ankle edema  Psychiatric: Appropriate affect, cooperative  Neurologic: Oriented x 3, Cranial Nerves grossly intact to confrontation, speech clear  Skin: No rashes    Pertinent  and/or Most Recent Results         Results from last 7 days  Lab Units 11/18/17  0436   WBC 10*3/mm3 5.59   HEMOGLOBIN g/dL 11.3*   HEMATOCRIT % 35.7   PLATELETS 10*3/mm3 260   SODIUM mmol/L 135   POTASSIUM mmol/L 3.7   CHLORIDE mmol/L 101   CO2 mmol/L 28.0   BUN mg/dL 12   CREATININE mg/dL 0.60   GLUCOSE mg/dL 96   CALCIUM mg/dL 8.8       Results from last 7 days  Lab Units 11/18/17  0436   BILIRUBIN mg/dL 1.0   ALK PHOS U/L 94   ALT (SGPT) U/L 10   AST (SGOT) U/L 17   PROTIME Seconds 13.3*   INR  1.21   APTT seconds 29.1           Invalid input(s): TG, LDLREALC    Results from last 7 days  Lab Units 11/19/17  0431 11/18/17  0436   BNP pg/mL  --  139.0*   TROPONIN I ng/mL <0.006 <0.006     Brief Urine Lab Results     None          Microbiology Results Abnormal     None          Imaging Results (all)     None                 Discharge Details       Marilyn Sood   Home Medication Instructions TERESA:[id number]    Printed on:11/19/17 2152   Medication Information                      acetaminophen (TYLENOL) 325 MG tablet  Take 2 tablets by mouth Every 6 (Six) Hours As Needed for Mild Pain .             carvedilol (COREG) 12.5 MG tablet  Take 12.5 mg by mouth 2 (Two) Times a Day.             clopidogrel (PLAVIX) 75 MG tablet  Take 75 mg by mouth Daily.             docusate sodium (COLACE) 100 MG capsule  Take 100 mg by mouth 2 (Two) Times a Day.             famotidine (PEPCID) 40 MG tablet  Take 40 mg by mouth Daily.             Ginger, Zingiber officinalis, (GINGER ROOT PO)  Take 1 tablet by mouth Daily.             isosorbide mononitrate (IMDUR) 30 MG 24 hr tablet  Take 4 tablets by mouth Daily.             pantoprazole (PROTONIX) 40 MG EC tablet  Take 1 tablet by mouth Daily for 28 days.             rosuvastatin (CRESTOR) 10 MG tablet  Take 1 tablet by mouth Daily.             sertraline (ZOLOFT) 25 MG tablet  Take 25 mg by mouth Daily.                   Discharge Disposition:  Home or Self Care    Discharge Diet: Gastroparesis diet      Discharge Activity: As tolerated      Special Instructions:    Future Appointments  Date Time Provider Department Center   5/25/2018 11:15 AM Edwin Cruz MD [facility] None           Time Spent on Discharge:  47 minutes    Maia Cisneros MD  11/19/17  7:19 PM

## 2018-06-27 ENCOUNTER — APPOINTMENT (OUTPATIENT)
Dept: GENERAL RADIOLOGY | Facility: HOSPITAL | Age: 83
End: 2018-06-27

## 2018-06-27 ENCOUNTER — HOSPITAL ENCOUNTER (EMERGENCY)
Facility: HOSPITAL | Age: 83
Discharge: HOME | End: 2018-06-27
Attending: EMERGENCY MEDICINE | Admitting: EMERGENCY MEDICINE

## 2018-06-27 ENCOUNTER — APPOINTMENT (OUTPATIENT)
Dept: CT IMAGING | Facility: HOSPITAL | Age: 83
End: 2018-06-27

## 2018-06-27 VITALS
SYSTOLIC BLOOD PRESSURE: 122 MMHG | HEIGHT: 64 IN | BODY MASS INDEX: 19.46 KG/M2 | HEART RATE: 60 BPM | RESPIRATION RATE: 16 BRPM | DIASTOLIC BLOOD PRESSURE: 62 MMHG | OXYGEN SATURATION: 97 % | WEIGHT: 114 LBS | TEMPERATURE: 98.1 F

## 2018-06-27 DIAGNOSIS — R42 DIZZINESS: ICD-10-CM

## 2018-06-27 DIAGNOSIS — I20.8 EXERTIONAL ANGINA (HCC): Primary | ICD-10-CM

## 2018-06-27 LAB
ALBUMIN SERPL-MCNC: 4.1 G/DL (ref 3.4–4.8)
ALBUMIN/GLOB SERPL: 1.4 G/DL (ref 1.5–2.5)
ALP SERPL-CCNC: 80 U/L (ref 35–104)
ALT SERPL W P-5'-P-CCNC: 14 U/L (ref 10–36)
AMYLASE SERPL-CCNC: 98 U/L (ref 28–100)
ANION GAP SERPL CALCULATED.3IONS-SCNC: 3.2 MMOL/L (ref 3.6–11.2)
APTT PPP: 32.1 SECONDS (ref 23.8–36.1)
AST SERPL-CCNC: 26 U/L (ref 10–30)
BACTERIA UR QL AUTO: ABNORMAL /HPF
BASOPHILS # BLD AUTO: 0.05 10*3/MM3 (ref 0–0.3)
BASOPHILS NFR BLD AUTO: 0.8 % (ref 0–2)
BILIRUB SERPL-MCNC: 0.7 MG/DL (ref 0.2–1.8)
BILIRUB UR QL STRIP: NEGATIVE
BNP SERPL-MCNC: 112 PG/ML (ref 0–100)
BUN BLD-MCNC: 14 MG/DL (ref 7–21)
BUN/CREAT SERPL: 20 (ref 7–25)
CALCIUM SPEC-SCNC: 9.1 MG/DL (ref 7.7–10)
CHLORIDE SERPL-SCNC: 106 MMOL/L (ref 99–112)
CK MB SERPL-CCNC: 1.43 NG/ML (ref 0–5)
CK MB SERPL-CCNC: 1.85 NG/ML (ref 0–5)
CK MB SERPL-RTO: 1.5 % (ref 0–3)
CK MB SERPL-RTO: 1.8 % (ref 0–3)
CK SERPL-CCNC: 104 U/L (ref 24–173)
CK SERPL-CCNC: 93 U/L (ref 24–173)
CLARITY UR: CLEAR
CO2 SERPL-SCNC: 28.8 MMOL/L (ref 24.3–31.9)
COLOR UR: YELLOW
CREAT BLD-MCNC: 0.7 MG/DL (ref 0.43–1.29)
D-LACTATE SERPL-SCNC: 0.5 MMOL/L (ref 0.5–2)
DEPRECATED RDW RBC AUTO: 50.4 FL (ref 37–54)
EOSINOPHIL # BLD AUTO: 0.26 10*3/MM3 (ref 0–0.7)
EOSINOPHIL NFR BLD AUTO: 3.9 % (ref 0–7)
ERYTHROCYTE [DISTWIDTH] IN BLOOD BY AUTOMATED COUNT: 15.4 % (ref 11.5–14.5)
GFR SERPL CREATININE-BSD FRML MDRD: 79 ML/MIN/1.73
GLOBULIN UR ELPH-MCNC: 2.9 GM/DL
GLUCOSE BLD-MCNC: 107 MG/DL (ref 70–110)
GLUCOSE UR STRIP-MCNC: NEGATIVE MG/DL
HCT VFR BLD AUTO: 40.4 % (ref 37–47)
HGB BLD-MCNC: 13.4 G/DL (ref 12–16)
HGB UR QL STRIP.AUTO: ABNORMAL
HYALINE CASTS UR QL AUTO: ABNORMAL /LPF
IMM GRANULOCYTES # BLD: 0.01 10*3/MM3 (ref 0–0.03)
IMM GRANULOCYTES NFR BLD: 0.2 % (ref 0–0.5)
INR PPP: 1.15 (ref 0.9–1.1)
KETONES UR QL STRIP: NEGATIVE
LEUKOCYTE ESTERASE UR QL STRIP.AUTO: ABNORMAL
LIPASE SERPL-CCNC: 66 U/L (ref 13–60)
LYMPHOCYTES # BLD AUTO: 1.69 10*3/MM3 (ref 1–3)
LYMPHOCYTES NFR BLD AUTO: 25.6 % (ref 16–46)
MCH RBC QN AUTO: 30.7 PG (ref 27–33)
MCHC RBC AUTO-ENTMCNC: 33.2 G/DL (ref 33–37)
MCV RBC AUTO: 92.4 FL (ref 80–94)
MONOCYTES # BLD AUTO: 1.01 10*3/MM3 (ref 0.1–0.9)
MONOCYTES NFR BLD AUTO: 15.3 % (ref 0–12)
NEUTROPHILS # BLD AUTO: 3.57 10*3/MM3 (ref 1.4–6.5)
NEUTROPHILS NFR BLD AUTO: 54.2 % (ref 40–75)
NITRITE UR QL STRIP: NEGATIVE
OSMOLALITY SERPL CALC.SUM OF ELEC: 276.6 MOSM/KG (ref 273–305)
PH UR STRIP.AUTO: 7.5 [PH] (ref 5–8)
PLATELET # BLD AUTO: 294 10*3/MM3 (ref 130–400)
PMV BLD AUTO: 9.8 FL (ref 6–10)
POTASSIUM BLD-SCNC: 4.1 MMOL/L (ref 3.5–5.3)
PROT SERPL-MCNC: 7 G/DL (ref 6–8)
PROT UR QL STRIP: NEGATIVE
PROTHROMBIN TIME: 14.9 SECONDS (ref 11–15.4)
RBC # BLD AUTO: 4.37 10*6/MM3 (ref 4.2–5.4)
RBC # UR: ABNORMAL /HPF
REF LAB TEST METHOD: ABNORMAL
SODIUM BLD-SCNC: 138 MMOL/L (ref 135–153)
SP GR UR STRIP: 1.01 (ref 1–1.03)
SQUAMOUS #/AREA URNS HPF: ABNORMAL /HPF
TROPONIN I SERPL-MCNC: 0.01 NG/ML
TROPONIN I SERPL-MCNC: 0.01 NG/ML
UROBILINOGEN UR QL STRIP: ABNORMAL
WBC NRBC COR # BLD: 6.59 10*3/MM3 (ref 4.5–12.5)
WBC UR QL AUTO: ABNORMAL /HPF

## 2018-06-27 PROCEDURE — 99285 EMERGENCY DEPT VISIT HI MDM: CPT

## 2018-06-27 PROCEDURE — 96361 HYDRATE IV INFUSION ADD-ON: CPT

## 2018-06-27 PROCEDURE — 80053 COMPREHEN METABOLIC PANEL: CPT | Performed by: EMERGENCY MEDICINE

## 2018-06-27 PROCEDURE — 81001 URINALYSIS AUTO W/SCOPE: CPT | Performed by: EMERGENCY MEDICINE

## 2018-06-27 PROCEDURE — 93010 ELECTROCARDIOGRAM REPORT: CPT | Performed by: INTERNAL MEDICINE

## 2018-06-27 PROCEDURE — 84484 ASSAY OF TROPONIN QUANT: CPT | Performed by: EMERGENCY MEDICINE

## 2018-06-27 PROCEDURE — 83605 ASSAY OF LACTIC ACID: CPT | Performed by: EMERGENCY MEDICINE

## 2018-06-27 PROCEDURE — 85730 THROMBOPLASTIN TIME PARTIAL: CPT | Performed by: EMERGENCY MEDICINE

## 2018-06-27 PROCEDURE — 85610 PROTHROMBIN TIME: CPT | Performed by: EMERGENCY MEDICINE

## 2018-06-27 PROCEDURE — 96360 HYDRATION IV INFUSION INIT: CPT

## 2018-06-27 PROCEDURE — 82550 ASSAY OF CK (CPK): CPT | Performed by: EMERGENCY MEDICINE

## 2018-06-27 PROCEDURE — 93005 ELECTROCARDIOGRAM TRACING: CPT | Performed by: EMERGENCY MEDICINE

## 2018-06-27 PROCEDURE — 87040 BLOOD CULTURE FOR BACTERIA: CPT | Performed by: EMERGENCY MEDICINE

## 2018-06-27 PROCEDURE — 70450 CT HEAD/BRAIN W/O DYE: CPT

## 2018-06-27 PROCEDURE — 36415 COLL VENOUS BLD VENIPUNCTURE: CPT

## 2018-06-27 PROCEDURE — 82553 CREATINE MB FRACTION: CPT | Performed by: EMERGENCY MEDICINE

## 2018-06-27 PROCEDURE — 83690 ASSAY OF LIPASE: CPT | Performed by: EMERGENCY MEDICINE

## 2018-06-27 PROCEDURE — 70450 CT HEAD/BRAIN W/O DYE: CPT | Performed by: RADIOLOGY

## 2018-06-27 PROCEDURE — 82150 ASSAY OF AMYLASE: CPT | Performed by: EMERGENCY MEDICINE

## 2018-06-27 PROCEDURE — 71045 X-RAY EXAM CHEST 1 VIEW: CPT | Performed by: RADIOLOGY

## 2018-06-27 PROCEDURE — 71045 X-RAY EXAM CHEST 1 VIEW: CPT

## 2018-06-27 PROCEDURE — 85025 COMPLETE CBC W/AUTO DIFF WBC: CPT | Performed by: EMERGENCY MEDICINE

## 2018-06-27 PROCEDURE — 83880 ASSAY OF NATRIURETIC PEPTIDE: CPT | Performed by: EMERGENCY MEDICINE

## 2018-06-27 RX ORDER — BUMETANIDE 0.25 MG/ML
1 INJECTION INTRAMUSCULAR; INTRAVENOUS ONCE
Status: DISCONTINUED | OUTPATIENT
Start: 2018-06-27 | End: 2018-06-27

## 2018-06-27 RX ORDER — SODIUM CHLORIDE 9 MG/ML
125 INJECTION, SOLUTION INTRAVENOUS CONTINUOUS
Status: DISCONTINUED | OUTPATIENT
Start: 2018-06-27 | End: 2018-06-27 | Stop reason: HOSPADM

## 2018-06-27 RX ORDER — ASPIRIN 325 MG
325 TABLET ORAL ONCE
Status: COMPLETED | OUTPATIENT
Start: 2018-06-27 | End: 2018-06-27

## 2018-06-27 RX ORDER — ACETAMINOPHEN 500 MG
1000 TABLET ORAL ONCE
Status: DISCONTINUED | OUTPATIENT
Start: 2018-06-27 | End: 2018-06-27

## 2018-06-27 RX ORDER — SODIUM CHLORIDE 0.9 % (FLUSH) 0.9 %
10 SYRINGE (ML) INJECTION AS NEEDED
Status: DISCONTINUED | OUTPATIENT
Start: 2018-06-27 | End: 2018-06-27 | Stop reason: HOSPADM

## 2018-06-27 RX ADMIN — ASPIRIN 325 MG: 325 TABLET ORAL at 02:39

## 2018-06-27 RX ADMIN — SODIUM CHLORIDE 125 ML/HR: 9 INJECTION, SOLUTION INTRAVENOUS at 04:43

## 2018-07-02 LAB
BACTERIA SPEC AEROBE CULT: NORMAL
BACTERIA SPEC AEROBE CULT: NORMAL
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
## 2018-07-26 ENCOUNTER — OFFICE VISIT (OUTPATIENT)
Dept: CARDIOLOGY | Facility: CLINIC | Age: 83
End: 2018-07-26

## 2018-07-26 VITALS
HEART RATE: 62 BPM | SYSTOLIC BLOOD PRESSURE: 138 MMHG | BODY MASS INDEX: 19.29 KG/M2 | HEIGHT: 64 IN | DIASTOLIC BLOOD PRESSURE: 74 MMHG | WEIGHT: 113 LBS

## 2018-07-26 DIAGNOSIS — I10 ESSENTIAL HYPERTENSION: ICD-10-CM

## 2018-07-26 DIAGNOSIS — E78.2 MIXED HYPERLIPIDEMIA: ICD-10-CM

## 2018-07-26 DIAGNOSIS — I25.9 IHD (ISCHEMIC HEART DISEASE): Primary | ICD-10-CM

## 2018-07-26 PROCEDURE — 99214 OFFICE O/P EST MOD 30 MIN: CPT | Performed by: NURSE PRACTITIONER

## 2018-07-26 RX ORDER — MONTELUKAST SODIUM 10 MG/1
10 TABLET ORAL NIGHTLY
COMMUNITY
End: 2020-04-06

## 2018-09-07 ENCOUNTER — APPOINTMENT (OUTPATIENT)
Dept: BONE DENSITY | Facility: HOSPITAL | Age: 83
End: 2018-09-07

## 2018-10-03 ENCOUNTER — HOSPITAL ENCOUNTER (OUTPATIENT)
Dept: BONE DENSITY | Facility: HOSPITAL | Age: 83
Discharge: HOME | End: 2018-10-03
Admitting: FAMILY MEDICINE

## 2018-10-03 DIAGNOSIS — Z78.0 POST-MENOPAUSAL: ICD-10-CM

## 2018-10-03 PROCEDURE — 77080 DXA BONE DENSITY AXIAL: CPT | Performed by: RADIOLOGY

## 2018-10-03 PROCEDURE — 77080 DXA BONE DENSITY AXIAL: CPT

## 2018-11-07 ENCOUNTER — TRANSCRIBE ORDERS (OUTPATIENT)
Dept: INFUSION THERAPY | Facility: HOSPITAL | Age: 83
End: 2018-11-07

## 2018-11-07 DIAGNOSIS — M81.0 SENILE OSTEOPOROSIS: Primary | ICD-10-CM

## 2018-11-07 DIAGNOSIS — K90.9 INTESTINAL MALABSORPTION, UNSPECIFIED TYPE: ICD-10-CM

## 2018-11-15 ENCOUNTER — APPOINTMENT (OUTPATIENT)
Dept: INFUSION THERAPY | Facility: HOSPITAL | Age: 83
End: 2018-11-15

## 2018-11-16 ENCOUNTER — HOSPITAL ENCOUNTER (OUTPATIENT)
Dept: INFUSION THERAPY | Facility: HOSPITAL | Age: 83
Discharge: HOME | End: 2018-11-16
Admitting: FAMILY MEDICINE

## 2018-11-16 VITALS
DIASTOLIC BLOOD PRESSURE: 89 MMHG | TEMPERATURE: 98.1 F | RESPIRATION RATE: 18 BRPM | BODY MASS INDEX: 18.44 KG/M2 | HEIGHT: 64 IN | SYSTOLIC BLOOD PRESSURE: 154 MMHG | WEIGHT: 108 LBS | HEART RATE: 60 BPM

## 2018-11-16 DIAGNOSIS — M81.0 OSTEOPOROSIS, UNSPECIFIED OSTEOPOROSIS TYPE, UNSPECIFIED PATHOLOGICAL FRACTURE PRESENCE: Primary | ICD-10-CM

## 2018-11-16 PROCEDURE — 96372 THER/PROPH/DIAG INJ SC/IM: CPT

## 2018-11-16 PROCEDURE — 96401 CHEMO ANTI-NEOPL SQ/IM: CPT

## 2018-11-16 PROCEDURE — 25010000002 DENOSUMAB 60 MG/ML SOLUTION: Performed by: FAMILY MEDICINE

## 2018-11-16 RX ADMIN — DENOSUMAB 60 MG: 60 INJECTION SUBCUTANEOUS at 15:16

## 2018-11-16 NOTE — PATIENT INSTRUCTIONS
Denosumab injection  What is this medicine?  DENOSUMAB (den oh blake mab) slows bone breakdown. Prolia is used to treat osteoporosis in women after menopause and in men. Xgeva is used to treat a high calcium level due to cancer and to prevent bone fractures and other bone problems caused by multiple myeloma or cancer bone metastases. Xgeva is also used to treat giant cell tumor of the bone.  This medicine may be used for other purposes; ask your health care provider or pharmacist if you have questions.  COMMON BRAND NAME(S): Prolia, XGEVA  What should I tell my health care provider before I take this medicine?  They need to know if you have any of these conditions:  -dental disease  -having surgery or tooth extraction  -infection  -kidney disease  -low levels of calcium or Vitamin D in the blood  -malnutrition  -on hemodialysis  -skin conditions or sensitivity  -thyroid or parathyroid disease  -an unusual reaction to denosumab, other medicines, foods, dyes, or preservatives  -pregnant or trying to get pregnant  -breast-feeding  How should I use this medicine?  This medicine is for injection under the skin. It is given by a health care professional in a hospital or clinic setting.  If you are getting Prolia, a special MedGuide will be given to you by the pharmacist with each prescription and refill. Be sure to read this information carefully each time.  For Prolia, talk to your pediatrician regarding the use of this medicine in children. Special care may be needed. For Xgeva, talk to your pediatrician regarding the use of this medicine in children. While this drug may be prescribed for children as young as 13 years for selected conditions, precautions do apply.  Overdosage: If you think you have taken too much of this medicine contact a poison control center or emergency room at once.  NOTE: This medicine is only for you. Do not share this medicine with others.  What if I miss a dose?  It is important not to miss your  dose. Call your doctor or health care professional if you are unable to keep an appointment.  What may interact with this medicine?  Do not take this medicine with any of the following medications:  -other medicines containing denosumab  This medicine may also interact with the following medications:  -medicines that lower your chance of fighting infection  -steroid medicines like prednisone or cortisone  This list may not describe all possible interactions. Give your health care provider a list of all the medicines, herbs, non-prescription drugs, or dietary supplements you use. Also tell them if you smoke, drink alcohol, or use illegal drugs. Some items may interact with your medicine.  What should I watch for while using this medicine?  Visit your doctor or health care professional for regular checks on your progress. Your doctor or health care professional may order blood tests and other tests to see how you are doing.  Call your doctor or health care professional for advice if you get a fever, chills or sore throat, or other symptoms of a cold or flu. Do not treat yourself. This drug may decrease your body's ability to fight infection. Try to avoid being around people who are sick.  You should make sure you get enough calcium and vitamin D while you are taking this medicine, unless your doctor tells you not to. Discuss the foods you eat and the vitamins you take with your health care professional.  See your dentist regularly. Brush and floss your teeth as directed. Before you have any dental work done, tell your dentist you are receiving this medicine.  Do not become pregnant while taking this medicine or for 5 months after stopping it. Talk with your doctor or health care professional about your birth control options while taking this medicine. Women should inform their doctor if they wish to become pregnant or think they might be pregnant. There is a potential for serious side effects to an unborn child. Talk  to your health care professional or pharmacist for more information.  What side effects may I notice from receiving this medicine?  Side effects that you should report to your doctor or health care professional as soon as possible:  -allergic reactions like skin rash, itching or hives, swelling of the face, lips, or tongue  -bone pain  -breathing problems  -dizziness  -jaw pain, especially after dental work  -redness, blistering, peeling of the skin  -signs and symptoms of infection like fever or chills; cough; sore throat; pain or trouble passing urine  -signs of low calcium like fast heartbeat, muscle cramps or muscle pain; pain, tingling, numbness in the hands or feet; seizures  -unusual bleeding or bruising  -unusually weak or tired  Side effects that usually do not require medical attention (report to your doctor or health care professional if they continue or are bothersome):  -constipation  -diarrhea  -headache  -joint pain  -loss of appetite  -muscle pain  -runny nose  -tiredness  -upset stomach  This list may not describe all possible side effects. Call your doctor for medical advice about side effects. You may report side effects to FDA at 5-662-FDA-0134.  Where should I keep my medicine?  This medicine is only given in a clinic, doctor's office, or other health care setting and will not be stored at home.  NOTE: This sheet is a summary. It may not cover all possible information. If you have questions about this medicine, talk to your doctor, pharmacist, or health care provider.  © 2018 [employer] (2018-01-09 19:17:21)

## 2019-02-14 ENCOUNTER — TELEPHONE (OUTPATIENT)
Dept: CARDIOLOGY | Facility: CLINIC | Age: 84
End: 2019-02-14

## 2019-02-14 NOTE — TELEPHONE ENCOUNTER
Ms Sood needs a tooth extraction.   Dr. Rober Mcneil will perform the procedure.  Needs clearance to hold Plavix and needs to know how long she can be off the Plavix.   Pt has had a stents in 2009  Procedure is being rescheduled.

## 2019-02-15 NOTE — TELEPHONE ENCOUNTER
Per [facility] order. Stop Plavix prior to tooth extraction.    Clearance letter w/[facility] order faxed to Dr. Tarun HAMILTON.  DMD office called confirming reception of letter.    Letter sent to Patient as well.

## 2019-02-17 ENCOUNTER — HOSPITAL ENCOUNTER (EMERGENCY)
Facility: HOSPITAL | Age: 84
Discharge: HOME | End: 2019-02-18
Attending: GENERAL ACUTE CARE HOSPITAL | Admitting: GENERAL ACUTE CARE HOSPITAL

## 2019-02-17 ENCOUNTER — APPOINTMENT (OUTPATIENT)
Dept: GENERAL RADIOLOGY | Facility: HOSPITAL | Age: 84
End: 2019-02-17

## 2019-02-17 DIAGNOSIS — K59.00 CONSTIPATION, UNSPECIFIED CONSTIPATION TYPE: Primary | ICD-10-CM

## 2019-02-17 LAB
ALBUMIN SERPL-MCNC: 4 G/DL (ref 3.4–4.8)
ALBUMIN/GLOB SERPL: 1.5 G/DL (ref 1.5–2.5)
ALP SERPL-CCNC: 63 U/L (ref 35–104)
ALT SERPL W P-5'-P-CCNC: 23 U/L (ref 10–36)
ANION GAP SERPL CALCULATED.3IONS-SCNC: 7 MMOL/L (ref 3.6–11.2)
AST SERPL-CCNC: 37 U/L (ref 10–30)
BASOPHILS # BLD AUTO: 0.05 10*3/MM3 (ref 0–0.3)
BASOPHILS NFR BLD AUTO: 0.6 % (ref 0–2)
BILIRUB SERPL-MCNC: 0.7 MG/DL (ref 0.2–1.8)
BUN BLD-MCNC: 16 MG/DL (ref 7–21)
BUN/CREAT SERPL: 23.2 (ref 7–25)
CALCIUM SPEC-SCNC: 9 MG/DL (ref 7.7–10)
CHLORIDE SERPL-SCNC: 98 MMOL/L (ref 99–112)
CO2 SERPL-SCNC: 25 MMOL/L (ref 24.3–31.9)
CREAT BLD-MCNC: 0.69 MG/DL (ref 0.43–1.29)
D-LACTATE SERPL-SCNC: 0.8 MMOL/L (ref 0.5–2)
DEPRECATED RDW RBC AUTO: 46.5 FL (ref 37–54)
EOSINOPHIL # BLD AUTO: 0.22 10*3/MM3 (ref 0–0.7)
EOSINOPHIL NFR BLD AUTO: 2.5 % (ref 0–7)
ERYTHROCYTE [DISTWIDTH] IN BLOOD BY AUTOMATED COUNT: 14.2 % (ref 11.5–14.5)
GFR SERPL CREATININE-BSD FRML MDRD: 80 ML/MIN/1.73
GLOBULIN UR ELPH-MCNC: 2.6 GM/DL
GLUCOSE BLD-MCNC: 102 MG/DL (ref 70–110)
HCT VFR BLD AUTO: 39 % (ref 37–47)
HGB BLD-MCNC: 12.8 G/DL (ref 12–16)
IMM GRANULOCYTES # BLD AUTO: 0.01 10*3/MM3 (ref 0–0.03)
IMM GRANULOCYTES NFR BLD AUTO: 0.1 % (ref 0–0.5)
LYMPHOCYTES # BLD AUTO: 1.63 10*3/MM3 (ref 1–3)
LYMPHOCYTES NFR BLD AUTO: 18.6 % (ref 16–46)
MCH RBC QN AUTO: 30 PG (ref 27–33)
MCHC RBC AUTO-ENTMCNC: 32.8 G/DL (ref 33–37)
MCV RBC AUTO: 91.3 FL (ref 80–94)
MONOCYTES # BLD AUTO: 1.09 10*3/MM3 (ref 0.1–0.9)
MONOCYTES NFR BLD AUTO: 12.4 % (ref 0–12)
NEUTROPHILS # BLD AUTO: 5.76 10*3/MM3 (ref 1.4–6.5)
NEUTROPHILS NFR BLD AUTO: 65.8 % (ref 40–75)
OSMOLALITY SERPL CALC.SUM OF ELEC: 262.2 MOSM/KG (ref 273–305)
PLATELET # BLD AUTO: 322 10*3/MM3 (ref 130–400)
PMV BLD AUTO: 9.1 FL (ref 6–10)
POTASSIUM BLD-SCNC: 3.9 MMOL/L (ref 3.5–5.3)
PROT SERPL-MCNC: 6.6 G/DL (ref 6–8)
RBC # BLD AUTO: 4.27 10*6/MM3 (ref 4.2–5.4)
SODIUM BLD-SCNC: 130 MMOL/L (ref 135–153)
TROPONIN I SERPL-MCNC: <0.006 NG/ML
WBC NRBC COR # BLD: 8.76 10*3/MM3 (ref 4.5–12.5)

## 2019-02-17 PROCEDURE — 25010000002 ONDANSETRON PER 1 MG: Performed by: GENERAL ACUTE CARE HOSPITAL

## 2019-02-17 PROCEDURE — 71045 X-RAY EXAM CHEST 1 VIEW: CPT | Performed by: RADIOLOGY

## 2019-02-17 PROCEDURE — 93010 ELECTROCARDIOGRAM REPORT: CPT | Performed by: INTERNAL MEDICINE

## 2019-02-17 PROCEDURE — 83605 ASSAY OF LACTIC ACID: CPT | Performed by: GENERAL ACUTE CARE HOSPITAL

## 2019-02-17 PROCEDURE — 71045 X-RAY EXAM CHEST 1 VIEW: CPT

## 2019-02-17 PROCEDURE — 85025 COMPLETE CBC W/AUTO DIFF WBC: CPT | Performed by: GENERAL ACUTE CARE HOSPITAL

## 2019-02-17 PROCEDURE — 93005 ELECTROCARDIOGRAM TRACING: CPT | Performed by: GENERAL ACUTE CARE HOSPITAL

## 2019-02-17 PROCEDURE — 96374 THER/PROPH/DIAG INJ IV PUSH: CPT

## 2019-02-17 PROCEDURE — 84484 ASSAY OF TROPONIN QUANT: CPT | Performed by: GENERAL ACUTE CARE HOSPITAL

## 2019-02-17 PROCEDURE — 99284 EMERGENCY DEPT VISIT MOD MDM: CPT

## 2019-02-17 PROCEDURE — 80053 COMPREHEN METABOLIC PANEL: CPT | Performed by: GENERAL ACUTE CARE HOSPITAL

## 2019-02-17 RX ORDER — ONDANSETRON 2 MG/ML
4 INJECTION INTRAMUSCULAR; INTRAVENOUS ONCE
Status: COMPLETED | OUTPATIENT
Start: 2019-02-17 | End: 2019-02-17

## 2019-02-17 RX ADMIN — ONDANSETRON 4 MG: 2 INJECTION, SOLUTION INTRAMUSCULAR; INTRAVENOUS at 23:10

## 2019-02-17 RX ADMIN — SODIUM CHLORIDE 1000 ML: 9 INJECTION, SOLUTION INTRAVENOUS at 23:10

## 2019-02-18 ENCOUNTER — APPOINTMENT (OUTPATIENT)
Dept: CT IMAGING | Facility: HOSPITAL | Age: 84
End: 2019-02-18

## 2019-02-18 VITALS
BODY MASS INDEX: 17.93 KG/M2 | SYSTOLIC BLOOD PRESSURE: 137 MMHG | HEIGHT: 64 IN | HEART RATE: 77 BPM | OXYGEN SATURATION: 99 % | WEIGHT: 105 LBS | TEMPERATURE: 98 F | DIASTOLIC BLOOD PRESSURE: 89 MMHG | RESPIRATION RATE: 18 BRPM

## 2019-02-18 PROCEDURE — 74177 CT ABD & PELVIS W/CONTRAST: CPT

## 2019-02-18 PROCEDURE — 74177 CT ABD & PELVIS W/CONTRAST: CPT | Performed by: RADIOLOGY

## 2019-02-18 PROCEDURE — 25010000002 IOPAMIDOL 61 % SOLUTION: Performed by: GENERAL ACUTE CARE HOSPITAL

## 2019-02-18 RX ORDER — DOCUSATE SODIUM 100 MG/1
100 CAPSULE, LIQUID FILLED ORAL 2 TIMES DAILY
Qty: 20 CAPSULE | Refills: 0 | Status: SHIPPED | OUTPATIENT
Start: 2019-02-18 | End: 2019-03-14 | Stop reason: SDUPTHER

## 2019-02-18 RX ORDER — CARVEDILOL 6.25 MG/1
12.5 TABLET ORAL
Status: COMPLETED | OUTPATIENT
Start: 2019-02-18 | End: 2019-02-18

## 2019-02-18 RX ORDER — MAGNESIUM HYDROXIDE 1200 MG/15ML
1 LIQUID ORAL ONCE
Qty: 1 ENEMA | Refills: 0 | Status: SHIPPED | OUTPATIENT
Start: 2019-02-18 | End: 2019-02-18

## 2019-02-18 RX ORDER — DOCUSATE SODIUM 100 MG/1
100 CAPSULE, LIQUID FILLED ORAL 2 TIMES DAILY
Status: DISCONTINUED | OUTPATIENT
Start: 2019-02-18 | End: 2019-02-18 | Stop reason: HOSPADM

## 2019-02-18 RX ADMIN — IOPAMIDOL 90 ML: 612 INJECTION, SOLUTION INTRAVENOUS at 00:59

## 2019-02-18 RX ADMIN — CARVEDILOL 12.5 MG: 6.25 TABLET, FILM COATED ORAL at 01:28

## 2019-02-18 RX ADMIN — DOCUSATE SODIUM 100 MG: 100 CAPSULE ORAL at 01:51

## 2019-02-18 NOTE — ED NOTES
Patient is in left lying carlisle position and is instructed to to hold enema in for 2-15 minutes and to let me know when she feels she needs to use the bathroom, patient verbalizes understanding      Niles Waters RN  02/18/19 [id number]

## 2019-02-18 NOTE — ED NOTES
Patient requests to be put back on bedside commode at this time, call light within reach     Niles Waters RN  02/18/19 3573

## 2019-02-18 NOTE — ED NOTES
Patient is now on bedside commode at this time, patient is instructed to call out with call light when finished, patient verbalizes understanding     Niles Waters RN  02/18/19 [id number]

## 2019-02-18 NOTE — ED PROVIDER NOTES
Subjective   Presenting with constipation and small amount of diarrhea. Has had trouble moving bowels for the past few days. Producing small amount of small stool with some wet BMs inbetween. Abdominal fullness and some pain in LLQ. Otherwise well.         Constipation   Severity:  Moderate  Timing:  Constant  Progression:  Worsening      Review of Systems   Constitutional: Negative.    HENT: Negative.    Eyes: Negative.    Respiratory: Negative.    Cardiovascular: Negative.    Gastrointestinal: Positive for constipation.   Endocrine: Negative.    Genitourinary: Negative.    Musculoskeletal: Negative.    Skin: Negative.    Allergic/Immunologic: Negative.    Neurological: Negative.    Hematological: Negative.    Psychiatric/Behavioral: Negative.        Past Medical History:   Diagnosis Date   • Arthritis    • Ascending aortic aneurysm (CMS/HCC) 10/6/2016   • Chronic hypokalemia 10/6/2016   • Colonic polyp 10/6/2016   • Dementia    • Dyslipidemia 10/6/2016   • Gastroparesis 10/6/2016   • GERD with esophagitis 10/6/2016   • Hypertension 10/6/2016   • IBS (irritable bowel syndrome) 10/6/2016   • IHD (ischemic heart disease) 10/6/2016   • Osteoarthritis 10/6/2016   • Osteoarthritis of left hip 10/6/2016   • Osteoporosis 10/6/2016   • Parkinson's disease (CMS/[facility]) 10/6/2016   • Peripheral edema 10/6/2016   • Syncope 10/6/2016       Allergies   Allergen Reactions   • Penicillins Anaphylaxis   • Reglan [Metoclopramide] Anaphylaxis   • Sulfa Antibiotics Anaphylaxis   • Nexium [Esomeprazole Magnesium]        Past Surgical History:   Procedure Laterality Date   • APPENDECTOMY      As a child   • CATARACT EXTRACTION WITH INTRAOCULAR LENS IMPLANT Bilateral     2006,2008   • CORONARY ANGIOPLASTY WITH STENT PLACEMENT  2009   • CORONARY ARTERY BYPASS GRAFT  2000    x3   • GASTRECTOMY PARTIAL / TOTAL      1970s   • LAPAROSCOPIC CHOLECYSTECTOMY  2003   • SPLENECTOMY  2007   • TONSILLECTOMY AND ADENOIDECTOMY      As a child   • TOTAL  ABDOMINAL HYSTERECTOMY WITH SALPINGO OOPHORECTOMY         Family History   Problem Relation Age of Onset   • Tuberculosis Mother    • No Known Problems Father        Social History     Socioeconomic History   • Marital status:      Spouse name: Not on file   • Number of children: Not on file   • Years of education: Not on file   • Highest education level: Not on file   Tobacco Use   • Smoking status: Former Smoker     Types: Cigarettes     Last attempt to quit: 1992     Years since quittin.8   • Smokeless tobacco: Never Used   Substance and Sexual Activity   • Alcohol use: No   • Drug use: No   • Sexual activity: Defer           Objective   Physical Exam   Constitutional: She is oriented to person, place, and time. She appears well-developed and well-nourished. No distress.   HENT:   Head: Normocephalic and atraumatic.   Mouth/Throat: No oropharyngeal exudate.   Eyes: Pupils are equal, round, and reactive to light. Right eye exhibits no discharge. Left eye exhibits no discharge.   Neck: Normal range of motion. Neck supple. No JVD present. No tracheal deviation present. No thyromegaly present.   Cardiovascular: Normal rate, regular rhythm, normal heart sounds and intact distal pulses. Exam reveals no gallop and no friction rub.   No murmur heard.  Pulmonary/Chest: Effort normal and breath sounds normal. No stridor. No respiratory distress. She has no wheezes.   Abdominal: Soft. She exhibits no distension. Bowel sounds are decreased. There is no tenderness. There is no guarding.   Musculoskeletal: Normal range of motion. She exhibits no edema or deformity.   Neurological: She is alert and oriented to person, place, and time. She displays normal reflexes. No cranial nerve deficit. She exhibits normal muscle tone.   Skin: Skin is warm and dry. Capillary refill takes less than 2 seconds. She is not diaphoretic. No erythema.   Psychiatric: She has a normal mood and affect. Her behavior is normal.  Judgment and thought content normal.   Nursing note and vitals reviewed.      Procedures           ED Course  ED Course as of Feb 18 1900   Mon Feb 18, 2019   0043 ECG 12 Lead [JF]   1858 Pt has been on the commode for some time having normal bowel movements. Pt states that she feels much better. Daughter will take her home. Told patient to return if her symptoms worsened or changed.   [JF]      ED Course User Index  [JF] Sheldon Tobias Jr., MD                  MDM  Number of Diagnoses or Management Options  Constipation, unspecified constipation type: established and improving  Diagnosis management comments: Fecal impaction on CT. Pt producing a good amount of stool after enema. Will have Pt follow up with PCP tomorrow. Strict return instructions given.        Amount and/or Complexity of Data Reviewed  Clinical lab tests: reviewed and ordered  Tests in the radiology section of CPT®: ordered and reviewed  Tests in the medicine section of CPT®: reviewed and ordered  Decide to obtain previous medical records or to obtain history from someone other than the patient: yes  Review and summarize past medical records: yes  Independent visualization of images, tracings, or specimens: yes    Risk of Complications, Morbidity, and/or Mortality  Presenting problems: moderate  Diagnostic procedures: moderate  Management options: moderate    Patient Progress  Patient progress: stable        Final diagnoses:   Constipation, unspecified constipation type            Sheldon Tobias Jr., MD  02/18/19 1900

## 2019-02-18 NOTE — ED NOTES
Patient called out and is ready to come off of bedside commode, patient noted to have a large bowel movement, patient assisted back in bed at this time call light within reach, patient is in no acute distress, no requests at this time, instructed to use call light if anything is wanted     Niles Waters, DALIA  02/18/19 [id number]
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
## 2019-03-14 ENCOUNTER — OFFICE VISIT (OUTPATIENT)
Dept: CARDIOLOGY | Facility: CLINIC | Age: 84
End: 2019-03-14

## 2019-03-14 VITALS
HEIGHT: 64 IN | BODY MASS INDEX: 17.58 KG/M2 | HEART RATE: 61 BPM | SYSTOLIC BLOOD PRESSURE: 92 MMHG | DIASTOLIC BLOOD PRESSURE: 53 MMHG | WEIGHT: 103 LBS

## 2019-03-14 DIAGNOSIS — E78.5 DYSLIPIDEMIA: ICD-10-CM

## 2019-03-14 DIAGNOSIS — I25.9 IHD (ISCHEMIC HEART DISEASE): Primary | ICD-10-CM

## 2019-03-14 DIAGNOSIS — I82.4Z2 ACUTE DEEP VEIN THROMBOSIS (DVT) OF DISTAL VEIN OF LEFT LOWER EXTREMITY (HCC): ICD-10-CM

## 2019-03-14 DIAGNOSIS — I10 ESSENTIAL HYPERTENSION: ICD-10-CM

## 2019-03-14 PROBLEM — I82.4Y9 ACUTE DEEP VEIN THROMBOSIS (DVT) OF PROXIMAL VEIN OF LOWER EXTREMITY (HCC): Status: ACTIVE | Noted: 2019-03-14

## 2019-03-14 PROCEDURE — 99214 OFFICE O/P EST MOD 30 MIN: CPT | Performed by: NURSE PRACTITIONER

## 2019-03-16 ENCOUNTER — HOSPITAL ENCOUNTER (EMERGENCY)
Facility: HOSPITAL | Age: 84
Discharge: HOME | End: 2019-03-16
Attending: EMERGENCY MEDICINE | Admitting: EMERGENCY MEDICINE

## 2019-03-16 ENCOUNTER — APPOINTMENT (OUTPATIENT)
Dept: GENERAL RADIOLOGY | Facility: HOSPITAL | Age: 84
End: 2019-03-16

## 2019-03-16 VITALS
WEIGHT: 103 LBS | HEART RATE: 54 BPM | OXYGEN SATURATION: 96 % | BODY MASS INDEX: 17.58 KG/M2 | HEIGHT: 64 IN | DIASTOLIC BLOOD PRESSURE: 90 MMHG | RESPIRATION RATE: 20 BRPM | SYSTOLIC BLOOD PRESSURE: 165 MMHG | TEMPERATURE: 97.7 F
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
## 2019-03-16 DIAGNOSIS — S51.011A SKIN TEAR OF RIGHT ELBOW WITHOUT COMPLICATION, INITIAL ENCOUNTER: Primary | ICD-10-CM

## 2019-03-16 PROCEDURE — 99284 EMERGENCY DEPT VISIT MOD MDM: CPT

## 2019-03-16 PROCEDURE — 71045 X-RAY EXAM CHEST 1 VIEW: CPT

## 2019-03-16 PROCEDURE — 73070 X-RAY EXAM OF ELBOW: CPT

## 2019-03-16 PROCEDURE — 71045 X-RAY EXAM CHEST 1 VIEW: CPT | Performed by: RADIOLOGY

## 2019-03-16 PROCEDURE — 73080 X-RAY EXAM OF ELBOW: CPT

## 2019-03-16 PROCEDURE — 73080 X-RAY EXAM OF ELBOW: CPT | Performed by: RADIOLOGY

## 2019-03-16 NOTE — ED PROVIDER NOTES
Subjective     Fall   Mechanism of injury: fall    Injury location:  Shoulder/arm  Shoulder/arm injury location:  R elbow  Fall:     Fall occurred:  In the bathroom    Impact surface:  Hard floor    Point of impact:  Unable to specify  Suspicion of alcohol use: no    Suspicion of drug use: no    Tetanus status:  Out of date  Prior to arrival data:     Patient ambulatory at scene: yes      Blood loss:  Minimal    Responsiveness at scene:  Alert    Orientation at scene:  Place, situation, time and person    Loss of consciousness: no      Amnesic to event: no    Associated symptoms: no abdominal pain and no chest pain        Review of Systems   Constitutional: Negative.  Negative for fever.   HENT: Negative.    Respiratory: Negative.    Cardiovascular: Negative.  Negative for chest pain.   Gastrointestinal: Negative.  Negative for abdominal pain.   Endocrine: Negative.    Genitourinary: Negative.  Negative for dysuria.   Skin: Negative.    Neurological: Negative.    Psychiatric/Behavioral: Negative.    All other systems reviewed and are negative.      Past Medical History:   Diagnosis Date   • Arthritis    • Ascending aortic aneurysm (CMS/HCC) 10/6/2016   • Chronic hypokalemia 10/6/2016   • Colonic polyp 10/6/2016   • Dementia    • Dyslipidemia 10/6/2016   • Gastroparesis 10/6/2016   • GERD with esophagitis 10/6/2016   • H/O blood clots    • Hypertension 10/6/2016   • IBS (irritable bowel syndrome) 10/6/2016   • IHD (ischemic heart disease) 10/6/2016   • Osteoarthritis 10/6/2016   • Osteoarthritis of left hip 10/6/2016   • Osteoporosis 10/6/2016   • Parkinson's disease (CMS/HCC) 10/6/2016   • Peripheral edema 10/6/2016   • Syncope 10/6/2016       Allergies   Allergen Reactions   • Penicillins Anaphylaxis   • Reglan [Metoclopramide] Anaphylaxis   • Sulfa Antibiotics Anaphylaxis   • Nexium [Esomeprazole Magnesium]        Past Surgical History:   Procedure Laterality Date   • APPENDECTOMY      As a child   • CATARACT  EXTRACTION WITH INTRAOCULAR LENS IMPLANT Bilateral     ,   • CORONARY ANGIOPLASTY WITH STENT PLACEMENT  2009   • CORONARY ARTERY BYPASS GRAFT  2000    x3   • GASTRECTOMY PARTIAL / TOTAL      1970s   • LAPAROSCOPIC CHOLECYSTECTOMY     • SPLENECTOMY     • TONSILLECTOMY AND ADENOIDECTOMY      As a child   • TOTAL ABDOMINAL HYSTERECTOMY WITH SALPINGO OOPHORECTOMY  1973       Family History   Problem Relation Age of Onset   • Tuberculosis Mother    • No Known Problems Father        Social History     Socioeconomic History   • Marital status:      Spouse name: Not on file   • Number of children: Not on file   • Years of education: Not on file   • Highest education level: Not on file   Tobacco Use   • Smoking status: Former Smoker     Types: Cigarettes     Last attempt to quit: 1992     Years since quittin.8   • Smokeless tobacco: Never Used   Substance and Sexual Activity   • Alcohol use: No   • Drug use: No   • Sexual activity: Defer           Objective   Physical Exam   Constitutional: She is oriented to person, place, and time. She appears well-developed and well-nourished. No distress.   HENT:   Head: Normocephalic and atraumatic.   Right Ear: External ear normal.   Left Ear: External ear normal.   Nose: Nose normal.   Eyes: Conjunctivae and EOM are normal. Pupils are equal, round, and reactive to light.   Neck: Normal range of motion. Neck supple. No JVD present. No tracheal deviation present.   Cardiovascular: Normal rate, regular rhythm and normal heart sounds.   No murmur heard.  Pulmonary/Chest: Effort normal and breath sounds normal. No respiratory distress. She has no wheezes.   Abdominal: Soft. Bowel sounds are normal. There is no tenderness.   Musculoskeletal: Normal range of motion. She exhibits no edema or deformity.   Neurological: She is alert and oriented to person, place, and time. No cranial nerve deficit.   Skin: Skin is warm and dry. No rash noted. She is not  diaphoretic. No erythema. No pallor.        Psychiatric: She has a normal mood and affect. Her behavior is normal. Thought content normal.   Nursing note and vitals reviewed.      Procedures           ED Course                  MDM      Final diagnoses:   Skin tear of right elbow without complication, initial encounter   Fall, initial encounter            Starr Jacobo, APRN  03/16/19 171

## 2019-09-16 ENCOUNTER — TRANSCRIBE ORDERS (OUTPATIENT)
Dept: INFUSION THERAPY | Facility: HOSPITAL | Age: 84
End: 2019-09-16

## 2019-09-16 DIAGNOSIS — M81.0 SENILE OSTEOPOROSIS: Primary | ICD-10-CM

## 2019-09-25 ENCOUNTER — OFFICE VISIT (OUTPATIENT)
Dept: CARDIOLOGY | Facility: CLINIC | Age: 84
End: 2019-09-25

## 2019-09-25 VITALS
WEIGHT: 107.2 LBS | BODY MASS INDEX: 18.3 KG/M2 | HEART RATE: 58 BPM | DIASTOLIC BLOOD PRESSURE: 90 MMHG | HEIGHT: 64 IN | SYSTOLIC BLOOD PRESSURE: 151 MMHG

## 2019-09-25 DIAGNOSIS — I25.9 IHD (ISCHEMIC HEART DISEASE): Primary | ICD-10-CM

## 2019-09-25 DIAGNOSIS — I10 ESSENTIAL HYPERTENSION: ICD-10-CM

## 2019-09-25 DIAGNOSIS — E78.2 MIXED HYPERLIPIDEMIA: ICD-10-CM

## 2019-09-25 PROCEDURE — 99214 OFFICE O/P EST MOD 30 MIN: CPT | Performed by: INTERNAL MEDICINE

## 2019-09-25 RX ORDER — ONDANSETRON 4 MG/1
4 TABLET, FILM COATED ORAL AS NEEDED
Refills: 0 | COMMUNITY
Start: 2019-08-23 | End: 2020-04-06

## 2019-09-25 RX ORDER — AMLODIPINE BESYLATE AND BENAZEPRIL HYDROCHLORIDE 2.5; 1 MG/1; MG/1
1 CAPSULE ORAL DAILY
Qty: 90 CAPSULE | Refills: 3 | Status: SHIPPED | OUTPATIENT
Start: 2019-09-25 | End: 2020-10-12
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
## 2019-11-18 ENCOUNTER — HOSPITAL ENCOUNTER (OUTPATIENT)
Dept: INFUSION THERAPY | Facility: HOSPITAL | Age: 84
Discharge: HOME | End: 2019-11-18
Admitting: FAMILY MEDICINE

## 2019-11-18 VITALS
BODY MASS INDEX: 18.1 KG/M2 | HEART RATE: 58 BPM | SYSTOLIC BLOOD PRESSURE: 146 MMHG | TEMPERATURE: 97.9 F | WEIGHT: 106 LBS | HEIGHT: 64 IN | DIASTOLIC BLOOD PRESSURE: 79 MMHG | RESPIRATION RATE: 20 BRPM

## 2019-11-18 DIAGNOSIS — M81.0 OSTEOPOROSIS, UNSPECIFIED OSTEOPOROSIS TYPE, UNSPECIFIED PATHOLOGICAL FRACTURE PRESENCE: Primary | ICD-10-CM

## 2019-11-18 PROCEDURE — 96401 CHEMO ANTI-NEOPL SQ/IM: CPT

## 2019-11-18 PROCEDURE — 25010000002 DENOSUMAB 60 MG/ML SOLUTION PREFILLED SYRINGE: Performed by: FAMILY MEDICINE

## 2019-11-18 PROCEDURE — 96372 THER/PROPH/DIAG INJ SC/IM: CPT

## 2019-11-18 RX ADMIN — DENOSUMAB 60 MG: 60 INJECTION SUBCUTANEOUS at 14:08

## 2020-04-06 ENCOUNTER — OFFICE VISIT (OUTPATIENT)
Dept: CARDIOLOGY | Facility: CLINIC | Age: 85
End: 2020-04-06

## 2020-04-06 VITALS — DIASTOLIC BLOOD PRESSURE: 80 MMHG | HEART RATE: 58 BPM | SYSTOLIC BLOOD PRESSURE: 140 MMHG

## 2020-04-06 DIAGNOSIS — R60.0 LOWER EXTREMITY EDEMA: ICD-10-CM

## 2020-04-06 DIAGNOSIS — I10 ESSENTIAL HYPERTENSION: ICD-10-CM

## 2020-04-06 DIAGNOSIS — E78.5 DYSLIPIDEMIA: ICD-10-CM

## 2020-04-06 DIAGNOSIS — I25.9 IHD (ISCHEMIC HEART DISEASE): Primary | ICD-10-CM

## 2020-04-06 PROCEDURE — 99214 OFFICE O/P EST MOD 30 MIN: CPT | Performed by: INTERNAL MEDICINE

## 2020-04-06 RX ORDER — TORSEMIDE 5 MG/1
5 TABLET ORAL ONCE AS NEEDED
Qty: 30 TABLET | Refills: 5 | Status: SHIPPED | OUTPATIENT
Start: 2020-04-06 | End: 2021-06-16
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
## 2020-04-14 RX ORDER — ROSUVASTATIN CALCIUM 10 MG/1
10 TABLET, COATED ORAL DAILY
Qty: 90 TABLET | Refills: 1 | Status: SHIPPED | OUTPATIENT
Start: 2020-04-14 | End: 2020-11-23

## 2020-10-12 RX ORDER — AMLODIPINE BESYLATE AND BENAZEPRIL HYDROCHLORIDE 2.5; 1 MG/1; MG/1
CAPSULE ORAL
Qty: 90 CAPSULE | Refills: 0 | Status: SHIPPED | OUTPATIENT
Start: 2020-10-12 | End: 2020-10-14 | Stop reason: SDUPTHER

## 2020-10-13 ENCOUNTER — TELEPHONE (OUTPATIENT)
Dept: CARDIOLOGY | Facility: CLINIC | Age: 85
End: 2020-10-13

## 2020-10-13 NOTE — TELEPHONE ENCOUNTER
Pt daughter called to let us know pt was exposed to COVID 2 days ago. Her grandson's GF tested positive for COVID this morning and was at the [facility] house 2 days ago.     Teleheath visit was scheduled for the pt- verified phone number to be used [phone].

## 2020-10-14 RX ORDER — AMLODIPINE BESYLATE AND BENAZEPRIL HYDROCHLORIDE 2.5; 1 MG/1; MG/1
1 CAPSULE ORAL DAILY
Qty: 90 CAPSULE | Refills: 3 | Status: SHIPPED | OUTPATIENT
Start: 2020-10-14 | End: 2022-02-08

## 2020-10-14 RX ORDER — NITROGLYCERIN 400 UG/1
1 SPRAY ORAL
Qty: 4.9 G | Refills: 6 | Status: SHIPPED | OUTPATIENT
Start: 2020-10-14 | End: 2022-11-11

## 2020-11-16 RX ORDER — ROSUVASTATIN CALCIUM 10 MG/1
TABLET, COATED ORAL
Qty: 90 TABLET | Refills: 0 | OUTPATIENT
Start: 2020-11-16

## 2020-11-23 DIAGNOSIS — E78.5 HYPERLIPIDEMIA LDL GOAL <100: Primary | ICD-10-CM

## 2020-11-23 RX ORDER — ROSUVASTATIN CALCIUM 10 MG/1
TABLET, COATED ORAL
Qty: 90 TABLET | Refills: 0 | Status: SHIPPED | OUTPATIENT
Start: 2020-11-23 | End: 2022-11-11

## 2021-02-17 DIAGNOSIS — Z23 IMMUNIZATION DUE: ICD-10-CM

## 2021-06-16 RX ORDER — TORSEMIDE 5 MG/1
TABLET ORAL
Qty: 90 TABLET | Refills: 0 | Status: SHIPPED | OUTPATIENT
Start: 2021-06-16 | End: 2022-11-11

## 2021-06-16 NOTE — TELEPHONE ENCOUNTER
11/12/2019 (reviewed with patient by letter)- recommended rosuvastatin:  · CBC: hematocrit 39, hemoglobin 12.7, WBC 5400 and normal indices, platelet count and differential  · CMP: normal electrolytes with normal kidney function, serum creatinine 0.7, BUN 11, glucose 119 and normal serum calcium and liver function tests.

## 2021-10-07 ENCOUNTER — LAB REQUISITION (OUTPATIENT)
Dept: LAB | Facility: HOSPITAL | Age: 86
End: 2021-10-07

## 2021-10-07 DIAGNOSIS — I10 ESSENTIAL (PRIMARY) HYPERTENSION: ICD-10-CM

## 2021-10-07 LAB
ALBUMIN SERPL-MCNC: 3.29 G/DL (ref 3.5–5.2)
ALBUMIN/GLOB SERPL: 1.4 G/DL
ALP SERPL-CCNC: 115 U/L (ref 39–117)
ALT SERPL W P-5'-P-CCNC: 12 U/L (ref 1–33)
ANION GAP SERPL CALCULATED.3IONS-SCNC: 9.3 MMOL/L (ref 5–15)
AST SERPL-CCNC: 20 U/L (ref 1–32)
BASOPHILS # BLD AUTO: 0.07 10*3/MM3 (ref 0–0.2)
BASOPHILS NFR BLD AUTO: 0.9 % (ref 0–1.5)
BILIRUB SERPL-MCNC: 0.3 MG/DL (ref 0–1.2)
BUN SERPL-MCNC: 19 MG/DL (ref 8–23)
BUN/CREAT SERPL: 33.9 (ref 7–25)
CALCIUM SPEC-SCNC: 8.7 MG/DL (ref 8.2–9.6)
CHLORIDE SERPL-SCNC: 107 MMOL/L (ref 98–107)
CHOLEST SERPL-MCNC: 125 MG/DL (ref 0–200)
CO2 SERPL-SCNC: 23.7 MMOL/L (ref 22–29)
CREAT SERPL-MCNC: 0.56 MG/DL (ref 0.57–1)
DEPRECATED RDW RBC AUTO: 51.3 FL (ref 37–54)
EOSINOPHIL # BLD AUTO: 0.27 10*3/MM3 (ref 0–0.4)
EOSINOPHIL NFR BLD AUTO: 3.6 % (ref 0.3–6.2)
ERYTHROCYTE [DISTWIDTH] IN BLOOD BY AUTOMATED COUNT: 15.7 % (ref 12.3–15.4)
GFR SERPL CREATININE-BSD FRML MDRD: 101 ML/MIN/1.73
GLOBULIN UR ELPH-MCNC: 2.4 GM/DL
GLUCOSE SERPL-MCNC: 94 MG/DL (ref 65–99)
HCT VFR BLD AUTO: 31.2 % (ref 34–46.6)
HDLC SERPL-MCNC: 55 MG/DL (ref 40–60)
HGB BLD-MCNC: 9.9 G/DL (ref 12–15.9)
IMM GRANULOCYTES # BLD AUTO: 0.03 10*3/MM3 (ref 0–0.05)
IMM GRANULOCYTES NFR BLD AUTO: 0.4 % (ref 0–0.5)
LDLC SERPL CALC-MCNC: 45 MG/DL (ref 0–100)
LDLC/HDLC SERPL: 0.75 {RATIO}
LYMPHOCYTES # BLD AUTO: 2.18 10*3/MM3 (ref 0.7–3.1)
LYMPHOCYTES NFR BLD AUTO: 28.8 % (ref 19.6–45.3)
MCH RBC QN AUTO: 28.4 PG (ref 26.6–33)
MCHC RBC AUTO-ENTMCNC: 31.7 G/DL (ref 31.5–35.7)
MCV RBC AUTO: 89.7 FL (ref 79–97)
MONOCYTES # BLD AUTO: 0.94 10*3/MM3 (ref 0.1–0.9)
MONOCYTES NFR BLD AUTO: 12.4 % (ref 5–12)
NEUTROPHILS NFR BLD AUTO: 4.07 10*3/MM3 (ref 1.7–7)
NEUTROPHILS NFR BLD AUTO: 53.9 % (ref 42.7–76)
NRBC BLD AUTO-RTO: 0 /100 WBC (ref 0–0.2)
PLATELET # BLD AUTO: 277 10*3/MM3 (ref 140–450)
PMV BLD AUTO: 10.6 FL (ref 6–12)
POTASSIUM SERPL-SCNC: 3.8 MMOL/L (ref 3.5–5.2)
PROT SERPL-MCNC: 5.7 G/DL (ref 6–8.5)
RBC # BLD AUTO: 3.48 10*6/MM3 (ref 3.77–5.28)
SODIUM SERPL-SCNC: 140 MMOL/L (ref 136–145)
TRIGL SERPL-MCNC: 145 MG/DL (ref 0–150)
TSH SERPL DL<=0.05 MIU/L-ACNC: 0.01 UIU/ML (ref 0.27–4.2)
VLDLC SERPL-MCNC: 25 MG/DL (ref 5–40)
WBC # BLD AUTO: 7.56 10*3/MM3 (ref 3.4–10.8)

## 2021-10-07 PROCEDURE — 84443 ASSAY THYROID STIM HORMONE: CPT | Performed by: NURSE PRACTITIONER

## 2021-10-07 PROCEDURE — 80061 LIPID PANEL: CPT | Performed by: NURSE PRACTITIONER

## 2021-10-07 PROCEDURE — 85025 COMPLETE CBC W/AUTO DIFF WBC: CPT | Performed by: NURSE PRACTITIONER

## 2021-10-07 PROCEDURE — 80053 COMPREHEN METABOLIC PANEL: CPT | Performed by: NURSE PRACTITIONER

## 2022-02-08 RX ORDER — AMLODIPINE BESYLATE AND BENAZEPRIL HYDROCHLORIDE 2.5; 1 MG/1; MG/1
1 CAPSULE ORAL DAILY
Qty: 30 CAPSULE | Refills: 0 | Status: SHIPPED | OUTPATIENT
Start: 2022-02-08 | End: 2022-11-11

## 2022-02-21 RX ORDER — AMLODIPINE BESYLATE AND BENAZEPRIL HYDROCHLORIDE 2.5; 1 MG/1; MG/1
CAPSULE ORAL
Qty: 30 CAPSULE | Refills: 0 | OUTPATIENT
Start: 2022-02-21

## 2022-02-24 RX ORDER — AMLODIPINE BESYLATE AND BENAZEPRIL HYDROCHLORIDE 2.5; 1 MG/1; MG/1
CAPSULE ORAL
Qty: 30 CAPSULE | Refills: 0 | OUTPATIENT
Start: 2022-02-24

## 2022-03-03 RX ORDER — AMLODIPINE BESYLATE AND BENAZEPRIL HYDROCHLORIDE 2.5; 1 MG/1; MG/1
CAPSULE ORAL
Qty: 30 CAPSULE | Refills: 0 | OUTPATIENT
Start: 2022-03-03

## 2022-03-07 RX ORDER — AMLODIPINE BESYLATE AND BENAZEPRIL HYDROCHLORIDE 2.5; 1 MG/1; MG/1
CAPSULE ORAL
Qty: 30 CAPSULE | Refills: 0 | OUTPATIENT
Start: 2022-03-07

## 2022-03-14 RX ORDER — AMLODIPINE BESYLATE AND BENAZEPRIL HYDROCHLORIDE 2.5; 1 MG/1; MG/1
CAPSULE ORAL
Qty: 30 CAPSULE | Refills: 0 | OUTPATIENT
Start: 2022-03-14

## 2022-03-16 RX ORDER — AMLODIPINE BESYLATE AND BENAZEPRIL HYDROCHLORIDE 2.5; 1 MG/1; MG/1
CAPSULE ORAL
Qty: 30 CAPSULE | Refills: 0 | OUTPATIENT
Start: 2022-03-16

## 2022-03-21 RX ORDER — AMLODIPINE BESYLATE AND BENAZEPRIL HYDROCHLORIDE 2.5; 1 MG/1; MG/1
CAPSULE ORAL
Qty: 30 CAPSULE | Refills: 0 | OUTPATIENT
Start: 2022-03-21

## 2022-11-08 ENCOUNTER — APPOINTMENT (OUTPATIENT)
Dept: GENERAL RADIOLOGY | Facility: HOSPITAL | Age: 87
End: 2022-11-08

## 2022-11-08 ENCOUNTER — HOSPITAL ENCOUNTER (EMERGENCY)
Facility: HOSPITAL | Age: 87
Discharge: HOME | End: 2022-11-08
Attending: EMERGENCY MEDICINE | Admitting: EMERGENCY MEDICINE

## 2022-11-08 VITALS
RESPIRATION RATE: 18 BRPM | DIASTOLIC BLOOD PRESSURE: 57 MMHG | OXYGEN SATURATION: 95 % | HEART RATE: 64 BPM | BODY MASS INDEX: 20.01 KG/M2 | SYSTOLIC BLOOD PRESSURE: 84 MMHG | HEIGHT: 61 IN | WEIGHT: 106 LBS

## 2022-11-08 DIAGNOSIS — M25.331 SCAPHOLUNATE DISSOCIATION OF RIGHT WRIST: Primary | ICD-10-CM

## 2022-11-08 LAB
ALBUMIN SERPL-MCNC: 3.26 G/DL (ref 3.5–5.2)
ALBUMIN/GLOB SERPL: 0.9 G/DL
ALP SERPL-CCNC: 73 U/L (ref 39–117)
ALT SERPL W P-5'-P-CCNC: <5 U/L (ref 1–33)
ANION GAP SERPL CALCULATED.3IONS-SCNC: 12.4 MMOL/L (ref 5–15)
AST SERPL-CCNC: 16 U/L (ref 1–32)
BASOPHILS # BLD AUTO: 0.06 10*3/MM3 (ref 0–0.2)
BASOPHILS NFR BLD AUTO: 0.5 % (ref 0–1.5)
BILIRUB SERPL-MCNC: 0.4 MG/DL (ref 0–1.2)
BUN SERPL-MCNC: 25 MG/DL (ref 8–23)
BUN/CREAT SERPL: 23.8 (ref 7–25)
CALCIUM SPEC-SCNC: 8.9 MG/DL (ref 8.2–9.6)
CHLORIDE SERPL-SCNC: 101 MMOL/L (ref 98–107)
CK SERPL-CCNC: 48 U/L (ref 20–180)
CO2 SERPL-SCNC: 22.6 MMOL/L (ref 22–29)
CREAT SERPL-MCNC: 1.05 MG/DL (ref 0.57–1)
DEPRECATED RDW RBC AUTO: 56.1 FL (ref 37–54)
EGFRCR SERPLBLD CKD-EPI 2021: 50 ML/MIN/1.73
EOSINOPHIL # BLD AUTO: 0.2 10*3/MM3 (ref 0–0.4)
EOSINOPHIL NFR BLD AUTO: 1.8 % (ref 0.3–6.2)
ERYTHROCYTE [DISTWIDTH] IN BLOOD BY AUTOMATED COUNT: 16.3 % (ref 12.3–15.4)
GLOBULIN UR ELPH-MCNC: 3.6 GM/DL
GLUCOSE SERPL-MCNC: 111 MG/DL (ref 65–99)
HCT VFR BLD AUTO: 36.2 % (ref 34–46.6)
HGB BLD-MCNC: 11.4 G/DL (ref 12–15.9)
IMM GRANULOCYTES # BLD AUTO: 0.05 10*3/MM3 (ref 0–0.05)
IMM GRANULOCYTES NFR BLD AUTO: 0.4 % (ref 0–0.5)
LYMPHOCYTES # BLD AUTO: 2.71 10*3/MM3 (ref 0.7–3.1)
LYMPHOCYTES NFR BLD AUTO: 23.9 % (ref 19.6–45.3)
MCH RBC QN AUTO: 29.6 PG (ref 26.6–33)
MCHC RBC AUTO-ENTMCNC: 31.5 G/DL (ref 31.5–35.7)
MCV RBC AUTO: 94 FL (ref 79–97)
MONOCYTES # BLD AUTO: 1.62 10*3/MM3 (ref 0.1–0.9)
MONOCYTES NFR BLD AUTO: 14.3 % (ref 5–12)
NEUTROPHILS NFR BLD AUTO: 59.1 % (ref 42.7–76)
NEUTROPHILS NFR BLD AUTO: 6.69 10*3/MM3 (ref 1.7–7)
NRBC BLD AUTO-RTO: 0 /100 WBC (ref 0–0.2)
PLATELET # BLD AUTO: 276 10*3/MM3 (ref 140–450)
PMV BLD AUTO: 10.5 FL (ref 6–12)
POTASSIUM SERPL-SCNC: 4.4 MMOL/L (ref 3.5–5.2)
PROT SERPL-MCNC: 6.9 G/DL (ref 6–8.5)
RBC # BLD AUTO: 3.85 10*6/MM3 (ref 3.77–5.28)
SODIUM SERPL-SCNC: 136 MMOL/L (ref 136–145)
WBC NRBC COR # BLD: 11.33 10*3/MM3 (ref 3.4–10.8)

## 2022-11-08 PROCEDURE — 80053 COMPREHEN METABOLIC PANEL: CPT | Performed by: EMERGENCY MEDICINE

## 2022-11-08 PROCEDURE — 99283 EMERGENCY DEPT VISIT LOW MDM: CPT

## 2022-11-08 PROCEDURE — 36415 COLL VENOUS BLD VENIPUNCTURE: CPT

## 2022-11-08 PROCEDURE — 73110 X-RAY EXAM OF WRIST: CPT

## 2022-11-08 PROCEDURE — 85025 COMPLETE CBC W/AUTO DIFF WBC: CPT | Performed by: EMERGENCY MEDICINE

## 2022-11-08 PROCEDURE — 82550 ASSAY OF CK (CPK): CPT | Performed by: EMERGENCY MEDICINE

## 2022-11-09 NOTE — ED PROVIDER NOTES
Subjective   History of Present Illness  Presents with right wrist swelling and pain    Wrist Pain  Severity:  Mild  Onset quality:  Gradual  Duration:  2 days  Timing:  Constant  Chronicity:  New      Review of Systems   Constitutional: Negative.    HENT: Negative.    Eyes: Negative.    Respiratory: Negative.    Cardiovascular: Negative.    Gastrointestinal: Negative.    Endocrine: Negative.    Genitourinary: Negative.    Musculoskeletal:        Swellng, pain right wrist   Allergic/Immunologic: Negative.    Neurological: Negative.    Hematological: Negative.        Past Medical History:   Diagnosis Date   • Arthritis    • Ascending aortic aneurysm 10/6/2016   • Chronic hypokalemia 10/6/2016   • Colonic polyp 10/6/2016   • Dementia (HCC)    • Dyslipidemia 10/6/2016   • Gastroparesis 10/6/2016   • GERD with esophagitis 10/6/2016   • H/O blood clots    • Hypertension 10/6/2016   • IBS (irritable bowel syndrome) 10/6/2016   • IHD (ischemic heart disease) 10/6/2016   • Osteoarthritis 10/6/2016   • Osteoarthritis of left hip 10/6/2016   • Osteoporosis 10/6/2016   • Parkinson's disease (HCC) 10/6/2016   • Peripheral edema 10/6/2016   • Syncope 10/6/2016       Allergies   Allergen Reactions   • Penicillins Anaphylaxis   • Reglan [Metoclopramide] Anaphylaxis   • Sulfa Antibiotics Anaphylaxis       Past Surgical History:   Procedure Laterality Date   • APPENDECTOMY      As a child   • CATARACT EXTRACTION WITH INTRAOCULAR LENS IMPLANT Bilateral     2006,2008   • CORONARY ANGIOPLASTY WITH STENT PLACEMENT  2009   • CORONARY ARTERY BYPASS GRAFT  2000    x3   • GASTRECTOMY PARTIAL / TOTAL      1970s   • LAPAROSCOPIC CHOLECYSTECTOMY  2003   • SPLENECTOMY  2007   • TONSILLECTOMY AND ADENOIDECTOMY      As a child   • TOTAL ABDOMINAL HYSTERECTOMY WITH SALPINGO OOPHORECTOMY  1973       Family History   Problem Relation Age of Onset   • Tuberculosis Mother    • No Known Problems Father        Social History     Socioeconomic History    • Marital status:    Tobacco Use   • Smoking status: Former     Packs/day: 1.00     Types: Cigarettes     Quit date: 1992     Years since quittin.5   • Smokeless tobacco: Never   Vaping Use   • Vaping Use: Never used   Substance and Sexual Activity   • Alcohol use: No   • Drug use: No   • Sexual activity: Defer           Objective   Physical Exam  Vitals and nursing note reviewed.   HENT:      Head: Normocephalic.      Nose: Nose normal.      Mouth/Throat:      Mouth: Mucous membranes are moist.   Eyes:      Pupils: Pupils are equal, round, and reactive to light.   Cardiovascular:      Rate and Rhythm: Normal rate.      Pulses: Normal pulses.   Pulmonary:      Effort: Pulmonary effort is normal.   Abdominal:      General: Abdomen is flat.   Musculoskeletal:      Cervical back: Normal range of motion.      Comments: Tender right wrist, swelling   Skin:     Capillary Refill: Capillary refill takes less than 2 seconds.   Neurological:      General: No focal deficit present.      Mental Status: She is alert.   Psychiatric:         Mood and Affect: Mood normal.         Procedures           ED Course  ED Course as of 22 Wrist : scapholunate dissociation [FLOYD]      ED Course User Index  [FLOYD] Celestino Ram MD                                           [facility]    Final diagnoses:   Scapholunate dissociation of right wrist       ED Disposition  ED Disposition     ED Disposition   Discharge    Condition   Stable    Comment   --             Jarod Burnett MD  [address] [place] [address] [address] [place] [address] [phone]    Schedule an appointment as soon as possible for a visit   If symptoms worsen    Iam Yang MD  [address] [place] [address] [address] [place] [address] [phone]    Schedule an appointment as soon as possible for a visit   If symptoms worsen         Medication List      No changes were made to your prescriptions during this visit.          Celestino Ram  MD BERNARDINO  11/08/22 [id number]

## 2022-11-11 ENCOUNTER — APPOINTMENT (OUTPATIENT)
Dept: CT IMAGING | Facility: HOSPITAL | Age: 87
End: 2022-11-11

## 2022-11-11 ENCOUNTER — HOSPITAL ENCOUNTER (OUTPATIENT)
Dept: GENERAL RADIOLOGY | Facility: HOSPITAL | Age: 87
Discharge: HOME | End: 2022-11-11
Admitting: FAMILY MEDICINE

## 2022-11-11 ENCOUNTER — HOSPITAL ENCOUNTER (INPATIENT)
Facility: HOSPITAL | Age: 87
LOS: 5 days | Discharge: NURSING, REHAB OR LONG-TERM CARE | End: 2022-11-16
Attending: STUDENT IN AN ORGANIZED HEALTH CARE EDUCATION/TRAINING PROGRAM | Admitting: HOSPITALIST

## 2022-11-11 ENCOUNTER — OFFICE VISIT (OUTPATIENT)
Dept: ORTHOPEDIC SURGERY | Facility: CLINIC | Age: 87
End: 2022-11-11

## 2022-11-11 ENCOUNTER — APPOINTMENT (OUTPATIENT)
Dept: GENERAL RADIOLOGY | Facility: HOSPITAL | Age: 87
End: 2022-11-11

## 2022-11-11 VITALS
SYSTOLIC BLOOD PRESSURE: 91 MMHG | BODY MASS INDEX: 20.01 KG/M2 | WEIGHT: 106 LBS | DIASTOLIC BLOOD PRESSURE: 61 MMHG | OXYGEN SATURATION: 96 % | HEIGHT: 61 IN | HEART RATE: 70 BPM

## 2022-11-11 DIAGNOSIS — J18.9 PNEUMONIA OF LEFT LOWER LOBE DUE TO INFECTIOUS ORGANISM: ICD-10-CM

## 2022-11-11 DIAGNOSIS — N39.0 ACUTE UTI: ICD-10-CM

## 2022-11-11 DIAGNOSIS — H91.93 BILATERAL HEARING LOSS, UNSPECIFIED HEARING LOSS TYPE: ICD-10-CM

## 2022-11-11 DIAGNOSIS — A41.9 SEPSIS, DUE TO UNSPECIFIED ORGANISM, UNSPECIFIED WHETHER ACUTE ORGAN DYSFUNCTION PRESENT: Primary | ICD-10-CM

## 2022-11-11 DIAGNOSIS — M25.531 WRIST PAIN, ACUTE, RIGHT: Primary | ICD-10-CM

## 2022-11-11 DIAGNOSIS — F02.80 DEMENTIA DUE TO PARKINSON'S DISEASE, UNSPECIFIED DEMENTIA SEVERITY, UNSPECIFIED WHETHER BEHAVIORAL, PSYCHOTIC, OR MOOD DISTURBANCE OR ANXIETY: ICD-10-CM

## 2022-11-11 DIAGNOSIS — M19.031 LOCALIZED PRIMARY OSTEOARTHRITIS OF RIGHT WRIST: ICD-10-CM

## 2022-11-11 DIAGNOSIS — G20 PARKINSON'S DISEASE: ICD-10-CM

## 2022-11-11 DIAGNOSIS — Z99.3 WHEELCHAIR DEPENDENCE: ICD-10-CM

## 2022-11-11 DIAGNOSIS — G89.4 CHRONIC PAIN SYNDROME: ICD-10-CM

## 2022-11-11 DIAGNOSIS — S63.399A RUPTURE OF SCAPHOLUNATE LIGAMENT: ICD-10-CM

## 2022-11-11 DIAGNOSIS — G20 DEMENTIA DUE TO PARKINSON'S DISEASE, UNSPECIFIED DEMENTIA SEVERITY, UNSPECIFIED WHETHER BEHAVIORAL, PSYCHOTIC, OR MOOD DISTURBANCE OR ANXIETY: ICD-10-CM

## 2022-11-11 PROBLEM — R65.20 SEVERE SEPSIS (HCC): Status: ACTIVE | Noted: 2022-11-11

## 2022-11-11 LAB
ALBUMIN SERPL-MCNC: 3.29 G/DL (ref 3.5–5.2)
ALBUMIN/GLOB SERPL: 0.8 G/DL
ALP SERPL-CCNC: 88 U/L (ref 39–117)
ALT SERPL W P-5'-P-CCNC: 10 U/L (ref 1–33)
ANION GAP SERPL CALCULATED.3IONS-SCNC: 16.4 MMOL/L (ref 5–15)
AST SERPL-CCNC: 24 U/L (ref 1–32)
BACTERIA UR QL AUTO: ABNORMAL /HPF
BASOPHILS # BLD AUTO: 0.05 10*3/MM3 (ref 0–0.2)
BASOPHILS NFR BLD AUTO: 0.6 % (ref 0–1.5)
BILIRUB SERPL-MCNC: 0.2 MG/DL (ref 0–1.2)
BILIRUB UR QL STRIP: NEGATIVE
BUN SERPL-MCNC: 46 MG/DL (ref 8–23)
BUN/CREAT SERPL: 35.9 (ref 7–25)
CALCIUM SPEC-SCNC: 9.3 MG/DL (ref 8.2–9.6)
CHLORIDE SERPL-SCNC: 105 MMOL/L (ref 98–107)
CLARITY UR: CLEAR
CO2 SERPL-SCNC: 18.6 MMOL/L (ref 22–29)
COLOR UR: ABNORMAL
CREAT SERPL-MCNC: 1.28 MG/DL (ref 0.57–1)
CRP SERPL-MCNC: 3.92 MG/DL (ref 0–0.5)
D-LACTATE SERPL-SCNC: 1.1 MMOL/L (ref 0.5–2)
D-LACTATE SERPL-SCNC: 3.2 MMOL/L (ref 0.5–2)
DEPRECATED RDW RBC AUTO: 54.5 FL (ref 37–54)
EGFRCR SERPLBLD CKD-EPI 2021: 39.4 ML/MIN/1.73
EOSINOPHIL # BLD AUTO: 0.17 10*3/MM3 (ref 0–0.4)
EOSINOPHIL NFR BLD AUTO: 2.2 % (ref 0.3–6.2)
ERYTHROCYTE [DISTWIDTH] IN BLOOD BY AUTOMATED COUNT: 16 % (ref 12.3–15.4)
FLUAV RNA RESP QL NAA+PROBE: NOT DETECTED
FLUBV RNA RESP QL NAA+PROBE: NOT DETECTED
GLOBULIN UR ELPH-MCNC: 4.1 GM/DL
GLUCOSE SERPL-MCNC: 143 MG/DL (ref 65–99)
GLUCOSE UR STRIP-MCNC: NEGATIVE MG/DL
HCT VFR BLD AUTO: 37.6 % (ref 34–46.6)
HGB BLD-MCNC: 12 G/DL (ref 12–15.9)
HGB UR QL STRIP.AUTO: ABNORMAL
HOLD SPECIMEN: NORMAL
HOLD SPECIMEN: NORMAL
HYALINE CASTS UR QL AUTO: ABNORMAL /LPF
IMM GRANULOCYTES # BLD AUTO: 0.04 10*3/MM3 (ref 0–0.05)
IMM GRANULOCYTES NFR BLD AUTO: 0.5 % (ref 0–0.5)
KETONES UR QL STRIP: ABNORMAL
LEUKOCYTE ESTERASE UR QL STRIP.AUTO: ABNORMAL
LYMPHOCYTES # BLD AUTO: 1.27 10*3/MM3 (ref 0.7–3.1)
LYMPHOCYTES NFR BLD AUTO: 16.4 % (ref 19.6–45.3)
MCH RBC QN AUTO: 29.4 PG (ref 26.6–33)
MCHC RBC AUTO-ENTMCNC: 31.9 G/DL (ref 31.5–35.7)
MCV RBC AUTO: 92.2 FL (ref 79–97)
MONOCYTES # BLD AUTO: 0.81 10*3/MM3 (ref 0.1–0.9)
MONOCYTES NFR BLD AUTO: 10.4 % (ref 5–12)
NEUTROPHILS NFR BLD AUTO: 5.42 10*3/MM3 (ref 1.7–7)
NEUTROPHILS NFR BLD AUTO: 69.9 % (ref 42.7–76)
NITRITE UR QL STRIP: NEGATIVE
NRBC BLD AUTO-RTO: 0 /100 WBC (ref 0–0.2)
NT-PROBNP SERPL-MCNC: 1369 PG/ML (ref 0–1800)
PH UR STRIP.AUTO: 5.5 [PH] (ref 5–8)
PLATELET # BLD AUTO: 309 10*3/MM3 (ref 140–450)
PMV BLD AUTO: 11 FL (ref 6–12)
POTASSIUM SERPL-SCNC: 3.7 MMOL/L (ref 3.5–5.2)
PROCALCITONIN SERPL-MCNC: 0.09 NG/ML (ref 0–0.25)
PROT SERPL-MCNC: 7.4 G/DL (ref 6–8.5)
PROT UR QL STRIP: NEGATIVE
RBC # BLD AUTO: 4.08 10*6/MM3 (ref 3.77–5.28)
RBC # UR STRIP: ABNORMAL /HPF
REF LAB TEST METHOD: ABNORMAL
SARS-COV-2 RNA RESP QL NAA+PROBE: NOT DETECTED
SODIUM SERPL-SCNC: 140 MMOL/L (ref 136–145)
SP GR UR STRIP: 1.01 (ref 1–1.03)
SQUAMOUS #/AREA URNS HPF: ABNORMAL /HPF
T4 FREE SERPL-MCNC: 0.72 NG/DL (ref 0.93–1.7)
TROPONIN T SERPL-MCNC: <0.01 NG/ML (ref 0–0.03)
TROPONIN T SERPL-MCNC: <0.01 NG/ML (ref 0–0.03)
TSH SERPL DL<=0.05 MIU/L-ACNC: 4.85 UIU/ML (ref 0.27–4.2)
UROBILINOGEN UR QL STRIP: ABNORMAL
VALPROATE SERPL-MCNC: 32 MCG/ML (ref 50–125)
WBC # UR STRIP: ABNORMAL /HPF
WBC NRBC COR # BLD: 7.76 10*3/MM3 (ref 3.4–10.8)
WHOLE BLOOD HOLD COAG: NORMAL
WHOLE BLOOD HOLD SPECIMEN: NORMAL

## 2022-11-11 PROCEDURE — 84145 PROCALCITONIN (PCT): CPT | Performed by: HOSPITALIST

## 2022-11-11 PROCEDURE — 83880 ASSAY OF NATRIURETIC PEPTIDE: CPT | Performed by: PHYSICIAN ASSISTANT

## 2022-11-11 PROCEDURE — 84443 ASSAY THYROID STIM HORMONE: CPT | Performed by: HOSPITALIST

## 2022-11-11 PROCEDURE — 25010000002 ONDANSETRON PER 1 MG: Performed by: PHYSICIAN ASSISTANT

## 2022-11-11 PROCEDURE — 99204 OFFICE O/P NEW MOD 45 MIN: CPT | Performed by: FAMILY MEDICINE

## 2022-11-11 PROCEDURE — 70450 CT HEAD/BRAIN W/O DYE: CPT

## 2022-11-11 PROCEDURE — 71045 X-RAY EXAM CHEST 1 VIEW: CPT | Performed by: RADIOLOGY

## 2022-11-11 PROCEDURE — 85025 COMPLETE CBC W/AUTO DIFF WBC: CPT | Performed by: PHYSICIAN ASSISTANT

## 2022-11-11 PROCEDURE — 25010000002 LORAZEPAM PER 2 MG: Performed by: EMERGENCY MEDICINE

## 2022-11-11 PROCEDURE — 87636 SARSCOV2 & INF A&B AMP PRB: CPT | Performed by: PHYSICIAN ASSISTANT

## 2022-11-11 PROCEDURE — 83605 ASSAY OF LACTIC ACID: CPT | Performed by: PHYSICIAN ASSISTANT

## 2022-11-11 PROCEDURE — P9612 CATHETERIZE FOR URINE SPEC: HCPCS

## 2022-11-11 PROCEDURE — 36415 COLL VENOUS BLD VENIPUNCTURE: CPT

## 2022-11-11 PROCEDURE — 25010000002 VANCOMYCIN 1 G RECONSTITUTED SOLUTION 1 EACH VIAL: Performed by: PHYSICIAN ASSISTANT

## 2022-11-11 PROCEDURE — 86140 C-REACTIVE PROTEIN: CPT | Performed by: PHYSICIAN ASSISTANT

## 2022-11-11 PROCEDURE — 71045 X-RAY EXAM CHEST 1 VIEW: CPT

## 2022-11-11 PROCEDURE — 93010 ELECTROCARDIOGRAM REPORT: CPT | Performed by: INTERNAL MEDICINE

## 2022-11-11 PROCEDURE — 73110 X-RAY EXAM OF WRIST: CPT | Performed by: RADIOLOGY

## 2022-11-11 PROCEDURE — 99223 1ST HOSP IP/OBS HIGH 75: CPT | Performed by: PHYSICIAN ASSISTANT

## 2022-11-11 PROCEDURE — 87086 URINE CULTURE/COLONY COUNT: CPT | Performed by: HOSPITALIST

## 2022-11-11 PROCEDURE — 74176 CT ABD & PELVIS W/O CONTRAST: CPT

## 2022-11-11 PROCEDURE — 99285 EMERGENCY DEPT VISIT HI MDM: CPT

## 2022-11-11 PROCEDURE — 84484 ASSAY OF TROPONIN QUANT: CPT | Performed by: PHYSICIAN ASSISTANT

## 2022-11-11 PROCEDURE — 81001 URINALYSIS AUTO W/SCOPE: CPT | Performed by: PHYSICIAN ASSISTANT

## 2022-11-11 PROCEDURE — 93005 ELECTROCARDIOGRAM TRACING: CPT | Performed by: PHYSICIAN ASSISTANT

## 2022-11-11 PROCEDURE — 80053 COMPREHEN METABOLIC PANEL: CPT | Performed by: PHYSICIAN ASSISTANT

## 2022-11-11 PROCEDURE — 73200 CT UPPER EXTREMITY W/O DYE: CPT

## 2022-11-11 PROCEDURE — 87040 BLOOD CULTURE FOR BACTERIA: CPT | Performed by: PHYSICIAN ASSISTANT

## 2022-11-11 PROCEDURE — 73110 X-RAY EXAM OF WRIST: CPT

## 2022-11-11 PROCEDURE — 71250 CT THORAX DX C-: CPT

## 2022-11-11 PROCEDURE — 80164 ASSAY DIPROPYLACETIC ACD TOT: CPT | Performed by: PHYSICIAN ASSISTANT

## 2022-11-11 PROCEDURE — 94799 UNLISTED PULMONARY SVC/PX: CPT

## 2022-11-11 PROCEDURE — 84439 ASSAY OF FREE THYROXINE: CPT | Performed by: HOSPITALIST

## 2022-11-11 RX ORDER — SENNA PLUS 8.6 MG/1
1 TABLET ORAL DAILY PRN
Status: CANCELLED | OUTPATIENT
Start: 2022-11-11

## 2022-11-11 RX ORDER — DOCUSATE SODIUM 100 MG/1
200 CAPSULE, LIQUID FILLED ORAL DAILY
Status: CANCELLED | OUTPATIENT
Start: 2022-11-12

## 2022-11-11 RX ORDER — ONDANSETRON 2 MG/ML
4 INJECTION INTRAMUSCULAR; INTRAVENOUS ONCE
Status: COMPLETED | OUTPATIENT
Start: 2022-11-11 | End: 2022-11-11

## 2022-11-11 RX ORDER — SENNA PLUS 8.6 MG/1
1 TABLET ORAL DAILY
Status: CANCELLED | OUTPATIENT
Start: 2022-11-12

## 2022-11-11 RX ORDER — TORSEMIDE 5 MG/1
5 TABLET ORAL DAILY
Status: CANCELLED | OUTPATIENT
Start: 2022-11-12

## 2022-11-11 RX ORDER — PANTOPRAZOLE SODIUM 40 MG/1
40 TABLET, DELAYED RELEASE ORAL DAILY
Status: CANCELLED | OUTPATIENT
Start: 2022-11-12

## 2022-11-11 RX ORDER — DIPHENOXYLATE HYDROCHLORIDE AND ATROPINE SULFATE 2.5; .025 MG/1; MG/1
1 TABLET ORAL DAILY
Status: CANCELLED | OUTPATIENT
Start: 2022-11-12

## 2022-11-11 RX ORDER — DIVALPROEX SODIUM 250 MG/1
250 TABLET, DELAYED RELEASE ORAL NIGHTLY
Status: CANCELLED | OUTPATIENT
Start: 2022-11-12

## 2022-11-11 RX ORDER — HEPARIN SODIUM 5000 [USP'U]/ML
5000 INJECTION, SOLUTION INTRAVENOUS; SUBCUTANEOUS EVERY 12 HOURS SCHEDULED
Status: DISCONTINUED | OUTPATIENT
Start: 2022-11-12 | End: 2022-11-14

## 2022-11-11 RX ORDER — BISACODYL 10 MG
10 SUPPOSITORY, RECTAL RECTAL DAILY PRN
COMMUNITY

## 2022-11-11 RX ORDER — CARVEDILOL 25 MG/1
25 TABLET ORAL 2 TIMES DAILY WITH MEALS
COMMUNITY
End: 2022-11-16 | Stop reason: HOSPADM

## 2022-11-11 RX ORDER — DIPHENOXYLATE HYDROCHLORIDE AND ATROPINE SULFATE 2.5; .025 MG/1; MG/1
1 TABLET ORAL DAILY
COMMUNITY

## 2022-11-11 RX ORDER — DOCUSATE CALCIUM 240 MG
240 CAPSULE ORAL DAILY
COMMUNITY

## 2022-11-11 RX ORDER — TRAMADOL HYDROCHLORIDE 50 MG/1
50 TABLET ORAL EVERY 6 HOURS PRN
Status: CANCELLED | OUTPATIENT
Start: 2022-11-11

## 2022-11-11 RX ORDER — CETIRIZINE HYDROCHLORIDE 10 MG/1
10 TABLET ORAL DAILY
Status: CANCELLED | OUTPATIENT
Start: 2022-11-12

## 2022-11-11 RX ORDER — AMLODIPINE BESYLATE 5 MG/1
2.5 TABLET ORAL DAILY
Status: CANCELLED | OUTPATIENT
Start: 2022-11-12

## 2022-11-11 RX ORDER — TORSEMIDE 5 MG/1
5 TABLET ORAL DAILY
COMMUNITY
End: 2022-11-16 | Stop reason: HOSPADM

## 2022-11-11 RX ORDER — AMLODIPINE BESYLATE 2.5 MG/1
2.5 TABLET ORAL DAILY
COMMUNITY

## 2022-11-11 RX ORDER — SODIUM CHLORIDE 0.9 % (FLUSH) 0.9 %
10 SYRINGE (ML) INJECTION AS NEEDED
Status: DISCONTINUED | OUTPATIENT
Start: 2022-11-11 | End: 2022-11-16 | Stop reason: HOSPADM

## 2022-11-11 RX ORDER — NITROGLYCERIN 0.4 MG/1
0.4 TABLET SUBLINGUAL
Status: DISCONTINUED | OUTPATIENT
Start: 2022-11-11 | End: 2022-11-16 | Stop reason: HOSPADM

## 2022-11-11 RX ORDER — ACETAMINOPHEN 500 MG
500 TABLET ORAL EVERY 6 HOURS PRN
Status: CANCELLED | OUTPATIENT
Start: 2022-11-11

## 2022-11-11 RX ORDER — LEVOTHYROXINE SODIUM 0.05 MG/1
50 TABLET ORAL DAILY
Status: CANCELLED | OUTPATIENT
Start: 2022-11-12

## 2022-11-11 RX ORDER — LORATADINE 10 MG/1
10 TABLET ORAL DAILY
COMMUNITY

## 2022-11-11 RX ORDER — SODIUM CHLORIDE 0.9 % (FLUSH) 0.9 %
10 SYRINGE (ML) INJECTION EVERY 12 HOURS SCHEDULED
Status: DISCONTINUED | OUTPATIENT
Start: 2022-11-12 | End: 2022-11-16 | Stop reason: HOSPADM

## 2022-11-11 RX ORDER — ASPIRIN 81 MG/1
81 TABLET ORAL DAILY
Status: CANCELLED | OUTPATIENT
Start: 2022-11-12

## 2022-11-11 RX ORDER — LEVOTHYROXINE SODIUM 0.05 MG/1
50 TABLET ORAL DAILY
COMMUNITY
Start: 2022-10-31

## 2022-11-11 RX ORDER — LORAZEPAM 2 MG/ML
0.25 INJECTION INTRAMUSCULAR ONCE
Status: COMPLETED | OUTPATIENT
Start: 2022-11-11 | End: 2022-11-11

## 2022-11-11 RX ORDER — DIVALPROEX SODIUM 250 MG/1
250 TABLET, DELAYED RELEASE ORAL NIGHTLY
COMMUNITY
Start: 2022-11-11

## 2022-11-11 RX ORDER — LINACLOTIDE 145 UG/1
145 CAPSULE, GELATIN COATED ORAL
COMMUNITY
Start: 2022-11-08

## 2022-11-11 RX ORDER — CARVEDILOL 25 MG/1
25 TABLET ORAL 2 TIMES DAILY WITH MEALS
Status: CANCELLED | OUTPATIENT
Start: 2022-11-12

## 2022-11-11 RX ORDER — SENNA PLUS 8.6 MG/1
1 TABLET ORAL DAILY
COMMUNITY

## 2022-11-11 RX ORDER — TRAMADOL HYDROCHLORIDE 50 MG/1
50 TABLET ORAL EVERY 6 HOURS PRN
Status: ON HOLD | COMMUNITY
Start: 2022-11-10 | End: 2022-11-16 | Stop reason: SDUPTHER

## 2022-11-11 RX ORDER — PANTOPRAZOLE SODIUM 40 MG/1
40 TABLET, DELAYED RELEASE ORAL DAILY
COMMUNITY
Start: 2022-10-31

## 2022-11-11 RX ORDER — BISACODYL 10 MG
10 SUPPOSITORY, RECTAL RECTAL DAILY PRN
Status: CANCELLED | OUTPATIENT
Start: 2022-11-11

## 2022-11-11 RX ORDER — ASPIRIN 81 MG/1
81 TABLET ORAL DAILY
COMMUNITY

## 2022-11-11 RX ORDER — SENNA PLUS 8.6 MG/1
1 TABLET ORAL DAILY PRN
COMMUNITY
End: 2022-11-16 | Stop reason: HOSPADM

## 2022-11-11 RX ORDER — CARVEDILOL 25 MG/1
TABLET ORAL
COMMUNITY
Start: 2022-11-03 | End: 2022-11-11

## 2022-11-11 RX ORDER — LACTOSE-REDUCED FOOD
LIQUID (ML) ORAL
COMMUNITY
End: 2022-11-11

## 2022-11-11 RX ORDER — ACETAMINOPHEN 500 MG
500 TABLET ORAL EVERY 6 HOURS PRN
COMMUNITY

## 2022-11-11 RX ORDER — CLOPIDOGREL BISULFATE 75 MG/1
75 TABLET ORAL DAILY
Status: CANCELLED | OUTPATIENT
Start: 2022-11-12

## 2022-11-11 RX ORDER — METRONIDAZOLE 500 MG/100ML
500 INJECTION, SOLUTION INTRAVENOUS EVERY 8 HOURS
Status: DISCONTINUED | OUTPATIENT
Start: 2022-11-12 | End: 2022-11-15

## 2022-11-11 RX ORDER — SODIUM CHLORIDE 9 MG/ML
75 INJECTION, SOLUTION INTRAVENOUS CONTINUOUS
Status: DISCONTINUED | OUTPATIENT
Start: 2022-11-12 | End: 2022-11-14

## 2022-11-11 RX ADMIN — SODIUM CHLORIDE 100 ML/HR: 9 INJECTION, SOLUTION INTRAVENOUS at 23:51

## 2022-11-11 RX ADMIN — SODIUM CHLORIDE 497 ML: 9 INJECTION, SOLUTION INTRAVENOUS at 18:01

## 2022-11-11 RX ADMIN — ONDANSETRON 4 MG: 2 INJECTION INTRAMUSCULAR; INTRAVENOUS at 21:32

## 2022-11-11 RX ADMIN — Medication 10 ML: at 23:51

## 2022-11-11 RX ADMIN — SODIUM CHLORIDE 100 ML/HR: 9 INJECTION, SOLUTION INTRAVENOUS at 23:55

## 2022-11-11 RX ADMIN — SODIUM CHLORIDE 2 G: 9 INJECTION, SOLUTION INTRAVENOUS at 17:57

## 2022-11-11 RX ADMIN — LORAZEPAM 0.25 MG: 2 INJECTION INTRAMUSCULAR; INTRAVENOUS at 17:49

## 2022-11-11 RX ADMIN — VANCOMYCIN HYDROCHLORIDE 1000 MG: 1 INJECTION, POWDER, LYOPHILIZED, FOR SOLUTION INTRAVENOUS at 19:32

## 2022-11-11 RX ADMIN — SODIUM CHLORIDE 1000 ML: 9 INJECTION, SOLUTION INTRAVENOUS at 16:45

## 2022-11-11 NOTE — PROGRESS NOTES
New Patient Visit      Patient: Marilyn Sood  YOB: 1930  Date of Encounter: 11/11/2022  PCP: Jarod Burnett MD  Referring Provider: Jarod Hampton MD     Subjective   Marilyn Sood is a 89 y.o. female who presents to the office today for evaluation of Pain, Edema, and Initial Evaluation of the Right Hand    The patient presents accompanied by her daughter, Eri. The patient does not provide any meaningful history of reactive oxygen species due to being, one, having significant Parkinson's disease, dementia, and also having severe hearing loss. History is provided by the daughter. The patient lives in a nursing home where she is usually in a wheelchair and has limited use of her right arm. The patient was found over the weekend to have a very swollen, painful right wrist and hand and was refusing to use it. There was apparently no witnessed fall and no one is sure how she was injured. The wrist is very painful and swollen and was somewhat discolored. The daughter had a picture of the wrist from earlier in the weekend and the swelling has improved since then. The patient was seen in the emergency room on 11/08/2022 and had x-rays which did not show any fractures but did show significant arthritis and suspected scapholunate widening and patient was referred to me. The patient is taking tramadol which seems to help her pain.      Chief Complaint   Patient presents with   • Right Hand - Pain, Edema, Initial Evaluation       Patient Active Problem List   Diagnosis   • IHD (ischemic heart disease)   • Hypertension   • Dyslipidemia   • Osteoarthritis   • Osteoporosis   • GERD with esophagitis   • Gastroparesis   • IBS (irritable bowel syndrome)   • Parkinson's disease (HCC)   • Colonic polyp   • Peripheral edema   • Dementia (HCC)   • Chronic hypokalemia   • Osteoarthritis of left hip   • Syncope   • Ascending aortic aneurysm   • Ischemic heart disease   • Mixed hyperlipidemia   •  Hypertensive urgency   • Acute deep vein thrombosis (DVT) of distal vein of left lower extremity (HCC)   • Lower extremity edema       Past Medical History:   Diagnosis Date   • Arthritis    • Ascending aortic aneurysm 10/6/2016   • Chronic hypokalemia 10/6/2016   • Colonic polyp 10/6/2016   • Dementia (HCC)    • Dyslipidemia 10/6/2016   • Gastroparesis 10/6/2016   • GERD with esophagitis 10/6/2016   • H/O blood clots    • Hypertension 10/6/2016   • IBS (irritable bowel syndrome) 10/6/2016   • IHD (ischemic heart disease) 10/6/2016   • Osteoarthritis 10/6/2016   • Osteoarthritis of left hip 10/6/2016   • Osteoporosis 10/6/2016   • Parkinson's disease (HCC) 10/6/2016   • Peripheral edema 10/6/2016   • Syncope 10/6/2016       Past Surgical History:   Procedure Laterality Date   • APPENDECTOMY      As a child   • CATARACT EXTRACTION WITH INTRAOCULAR LENS IMPLANT Bilateral     ,   • CORONARY ANGIOPLASTY WITH STENT PLACEMENT     • CORONARY ARTERY BYPASS GRAFT  2000    x3   • GASTRECTOMY PARTIAL / TOTAL      1970s   • LAPAROSCOPIC CHOLECYSTECTOMY     • SPLENECTOMY     • TONSILLECTOMY AND ADENOIDECTOMY      As a child   • TOTAL ABDOMINAL HYSTERECTOMY WITH SALPINGO OOPHORECTOMY  1973       Family History   Problem Relation Age of Onset   • Tuberculosis Mother    • No Known Problems Father        Social History     Socioeconomic History   • Marital status:    Tobacco Use   • Smoking status: Former     Packs/day: 1.00     Types: Cigarettes     Quit date: 1992     Years since quittin.5   • Smokeless tobacco: Never   Vaping Use   • Vaping Use: Never used   Substance and Sexual Activity   • Alcohol use: No   • Drug use: No   • Sexual activity: Defer       Current Outpatient Medications   Medication Sig Dispense Refill   • acetaminophen (TYLENOL) 500 MG tablet Take 1 tablet by mouth Every 6 (Six) Hours As Needed for Mild Pain.     • amLODIPine-benazepril (LOTREL 2.5-10) 2.5-10 MG per capsule  Take 1 capsule by mouth Daily. NEED APPOINTMENT FOR FURTHER REFILLS 30 capsule 0   • aspirin 81 MG EC tablet Take 1 tablet by mouth Daily.     • bisacodyl (DULCOLAX) 10 MG suppository Insert 1 suppository into the rectum Daily.     • carbidopa-levodopa (SINEMET)  MG per tablet      • carvedilol (COREG) 25 MG tablet      • clopidogrel (PLAVIX) 75 MG tablet Take 75 mg by mouth Daily.     • divalproex (DEPAKOTE) 250 MG DR tablet      • docusate calcium (SURFAK) 240 MG capsule Take 1 capsule by mouth 2 (Two) Times a Day.     • levothyroxine (SYNTHROID, LEVOTHROID) 50 MCG tablet      • Linzess 145 MCG capsule capsule      • loratadine (CLARITIN) 10 MG tablet Take 1 tablet by mouth Daily.     • multivitamin (MULTIVITAMIN PO) Take  by mouth Daily.     • Nutritional Supplements (Ensure Plus) liquid Take  by mouth. 0.05 grams to 1.5 grams BID     • pantoprazole (PROTONIX) 40 MG EC tablet      • senna (Senna-Lax) 8.6 MG tablet Take 1 tablet by mouth Daily.     • sertraline (ZOLOFT) 50 MG tablet Take 50 mg by mouth Daily.  6   • torsemide (DEMADEX) 5 MG tablet TAKE 1 TABLET BY MOUTH 1 TIME AS NEEDED (INCREASED SHORTNESS OF BREATH, EDEMA) FOR UP TO 1 DOSE 90 tablet 0   • traMADol (ULTRAM) 50 MG tablet      • carvedilol (COREG) 12.5 MG tablet Take 12.5 mg by mouth 2 (Two) Times a Day.  0   • docusate sodium (COLACE) 100 MG capsule Take 100 mg by mouth 2 (Two) Times a Day.     • Ginger, Zingiber officinalis, (GINGER ROOT PO) Take 1 tablet by mouth Daily.     • nitroglycerin (Nitrolingual) 0.4 MG/SPRAY spray Place 1 spray under the tongue Every 5 (Five) Minutes As Needed for Chest Pain. 4.9 g 6   • rosuvastatin (CRESTOR) 10 MG tablet Take 1 tablet by mouth once daily 90 tablet 0     No current facility-administered medications for this visit.       Allergies   Allergen Reactions   • Penicillins Anaphylaxis   • Reglan [Metoclopramide] Anaphylaxis   • Sulfa Antibiotics Anaphylaxis       Review of Systems   Unable to perform  "ROS: Dementia   Musculoskeletal:        Right wrist injury.       Visit Vitals  BP 91/61   Pulse 70   Ht 154.9 cm (61\")   Wt 48.1 kg (106 lb)   SpO2 96%   BMI 20.03 kg/m²     89 y.o.female  Physical Exam  Vitals and nursing note reviewed.   Constitutional:       General: She is not in acute distress.     Appearance: She is not ill-appearing or diaphoretic.   Pulmonary:      Effort: Pulmonary effort is normal. No respiratory distress.   Skin:     General: Skin is warm and dry.      Findings: No erythema.   Neurological:      General: No focal deficit present.      Mental Status: She is disoriented.      Comments: awake     Patient is wheelchair bound. The right hand and wrist exam is limited as the patient cannot be an active participant in the exam and cannot follow instructions. 2+ radial pulse on the right wrist. Significant swelling of localized edema of the hand without ecchymosis or bruising. Limited range of motion of the wrist with apparent pain at end range. Tender to palpation over the dorsal wrist, particularly in the area of the scaphoid. Intact deep tendon reflexes. Beyond this, the patient will not participate in the exam.    Radiology Results:    XR Wrist 3+ View Right    Result Date: 11/11/2022    Chondrocalcinosis and arthritic change but no acute bony abnormality.  This report was finalized on 11/11/2022 9:58 AM by Dr. Kev Souza MD.      XR Wrist 3+ View Right    Result Date: 11/8/2022  1.  Advanced degenerative changes of the radiocarpal and carpal joints. 2.  Chondrocalcinosis of the radiocarpal joint. 3.  Widening of the scapholunate interval suggestive of scapholunate dissociation. Signer Name: Pedro Jerome MD  Signed: 11/8/2022 8:41 PM  Workstation Name: [id number]  [facility]      Assessment & Plan   Diagnoses and all orders for this visit:    1. Wrist pain, acute, right (Primary)  -     XR Wrist 3+ View Right  -     CT wrist right wo contrast; Future    2. 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 Rupture of scapholunate ligament  -     XR Wrist 3+ View Right  -     CT wrist right wo contrast; Future    4. Parkinson's disease (HCC)  -     XR Wrist 3+ View Right  -     CT wrist right wo contrast; Future    5. Dementia due to Parkinson's disease, unspecified dementia severity, unspecified whether behavioral, psychotic, or mood disturbance or anxiety (HCC)  -     XR Wrist 3+ View Right  -     CT wrist right wo contrast; Future    6. Bilateral hearing loss, unspecified hearing loss type    7. Wheelchair dependence         MEDS ORDERED DURING VISIT:  No orders of the defined types were placed in this encounter.    MEDICATION ISSUES:  Discussed medication options and treatment plan of prescribed medication as well as the risks, benefits, and side effects including potential falls, possible impaired driving and metabolic adversities among others. Patient is agreeable to call the office with any worsening of symptoms or onset of side effects. Patient is agreeable to call 911 or go to the nearest ER should he/she begin having SI/HI.     Discussion:  Discussed medication options and treatment plan of prescribed medication as well as the risks, benefits, and side effects including potential falls, possible impaired driving and metabolic adversities among others. Patient is agreeable to call the office with any worsening of symptoms or onset of side effects. Patient is agreeable to call 911 or go to the nearest ER should he/she begin having SI/HI.  Discussed this plan with patient's daughter who agrees.            This document has been electronically signed by Dago Bell DO   November 11, 2022 14:22 EST    Part of this note may be an electronic transcription/translation of spoken language to printed text using the Dragon Dictation System.      Transcribed from ambient dictation for Dago Bell DO  by Catalina Marc  11/11/22   16:10 EST    I have personally performed the services described in this document as transcribed by the above individual, and it is both accurate and complete.

## 2022-11-12 ENCOUNTER — APPOINTMENT (OUTPATIENT)
Dept: CARDIOLOGY | Facility: HOSPITAL | Age: 87
End: 2022-11-12

## 2022-11-12 LAB
ALBUMIN SERPL-MCNC: 2.52 G/DL (ref 3.5–5.2)
ALBUMIN/GLOB SERPL: 0.7 G/DL
ALP SERPL-CCNC: 67 U/L (ref 39–117)
ALT SERPL W P-5'-P-CCNC: 10 U/L (ref 1–33)
ANION GAP SERPL CALCULATED.3IONS-SCNC: 12 MMOL/L (ref 5–15)
AST SERPL-CCNC: 22 U/L (ref 1–32)
BACTERIA SPEC AEROBE CULT: NORMAL
BASOPHILS # BLD AUTO: 0.06 10*3/MM3 (ref 0–0.2)
BASOPHILS NFR BLD AUTO: 0.4 % (ref 0–1.5)
BILIRUB SERPL-MCNC: 0.2 MG/DL (ref 0–1.2)
BUN SERPL-MCNC: 37 MG/DL (ref 8–23)
BUN/CREAT SERPL: 37.4 (ref 7–25)
CALCIUM SPEC-SCNC: 8 MG/DL (ref 8.2–9.6)
CHLORIDE SERPL-SCNC: 111 MMOL/L (ref 98–107)
CO2 SERPL-SCNC: 19 MMOL/L (ref 22–29)
CREAT SERPL-MCNC: 0.99 MG/DL (ref 0.57–1)
CRP SERPL-MCNC: 2.85 MG/DL (ref 0–0.5)
DEPRECATED RDW RBC AUTO: 54.2 FL (ref 37–54)
EGFRCR SERPLBLD CKD-EPI 2021: 53.6 ML/MIN/1.73
EOSINOPHIL # BLD AUTO: 0.06 10*3/MM3 (ref 0–0.4)
EOSINOPHIL NFR BLD AUTO: 0.4 % (ref 0.3–6.2)
ERYTHROCYTE [DISTWIDTH] IN BLOOD BY AUTOMATED COUNT: 16 % (ref 12.3–15.4)
GLOBULIN UR ELPH-MCNC: 3.4 GM/DL
GLUCOSE SERPL-MCNC: 156 MG/DL (ref 65–99)
HCT VFR BLD AUTO: 32.7 % (ref 34–46.6)
HGB BLD-MCNC: 10.4 G/DL (ref 12–15.9)
IMM GRANULOCYTES # BLD AUTO: 0.09 10*3/MM3 (ref 0–0.05)
IMM GRANULOCYTES NFR BLD AUTO: 0.7 % (ref 0–0.5)
LYMPHOCYTES # BLD AUTO: 0.32 10*3/MM3 (ref 0.7–3.1)
LYMPHOCYTES NFR BLD AUTO: 2.3 % (ref 19.6–45.3)
MCH RBC QN AUTO: 29.6 PG (ref 26.6–33)
MCHC RBC AUTO-ENTMCNC: 31.8 G/DL (ref 31.5–35.7)
MCV RBC AUTO: 93.2 FL (ref 79–97)
MONOCYTES # BLD AUTO: 0.56 10*3/MM3 (ref 0.1–0.9)
MONOCYTES NFR BLD AUTO: 4.1 % (ref 5–12)
MRSA DNA SPEC QL NAA+PROBE: NORMAL
NEUTROPHILS NFR BLD AUTO: 12.56 10*3/MM3 (ref 1.7–7)
NEUTROPHILS NFR BLD AUTO: 92.1 % (ref 42.7–76)
NRBC BLD AUTO-RTO: 0 /100 WBC (ref 0–0.2)
PLATELET # BLD AUTO: 248 10*3/MM3 (ref 140–450)
PMV BLD AUTO: 11 FL (ref 6–12)
POTASSIUM SERPL-SCNC: 3.4 MMOL/L (ref 3.5–5.2)
POTASSIUM SERPL-SCNC: 4.5 MMOL/L (ref 3.5–5.2)
PROT SERPL-MCNC: 5.9 G/DL (ref 6–8.5)
RBC # BLD AUTO: 3.51 10*6/MM3 (ref 3.77–5.28)
SODIUM SERPL-SCNC: 142 MMOL/L (ref 136–145)
WBC NRBC COR # BLD: 13.65 10*3/MM3 (ref 3.4–10.8)

## 2022-11-12 PROCEDURE — 84132 ASSAY OF SERUM POTASSIUM: CPT | Performed by: STUDENT IN AN ORGANIZED HEALTH CARE EDUCATION/TRAINING PROGRAM

## 2022-11-12 PROCEDURE — 25010000002 HEPARIN (PORCINE) PER 1000 UNITS: Performed by: HOSPITALIST

## 2022-11-12 PROCEDURE — 93306 TTE W/DOPPLER COMPLETE: CPT

## 2022-11-12 PROCEDURE — 93306 TTE W/DOPPLER COMPLETE: CPT | Performed by: INTERNAL MEDICINE

## 2022-11-12 PROCEDURE — 85025 COMPLETE CBC W/AUTO DIFF WBC: CPT | Performed by: HOSPITALIST

## 2022-11-12 PROCEDURE — 99232 SBSQ HOSP IP/OBS MODERATE 35: CPT | Performed by: STUDENT IN AN ORGANIZED HEALTH CARE EDUCATION/TRAINING PROGRAM

## 2022-11-12 PROCEDURE — 87641 MR-STAPH DNA AMP PROBE: CPT | Performed by: HOSPITALIST

## 2022-11-12 PROCEDURE — 25010000002 VANCOMYCIN 5 G RECONSTITUTED SOLUTION: Performed by: STUDENT IN AN ORGANIZED HEALTH CARE EDUCATION/TRAINING PROGRAM

## 2022-11-12 PROCEDURE — 86140 C-REACTIVE PROTEIN: CPT | Performed by: HOSPITALIST

## 2022-11-12 PROCEDURE — 80053 COMPREHEN METABOLIC PANEL: CPT | Performed by: HOSPITALIST

## 2022-11-12 PROCEDURE — 92610 EVALUATE SWALLOWING FUNCTION: CPT

## 2022-11-12 RX ORDER — DIVALPROEX SODIUM 250 MG/1
250 TABLET, DELAYED RELEASE ORAL NIGHTLY
Status: DISCONTINUED | OUTPATIENT
Start: 2022-11-12 | End: 2022-11-16 | Stop reason: HOSPADM

## 2022-11-12 RX ORDER — CETIRIZINE HYDROCHLORIDE 10 MG/1
10 TABLET ORAL DAILY
Status: DISCONTINUED | OUTPATIENT
Start: 2022-11-12 | End: 2022-11-16 | Stop reason: HOSPADM

## 2022-11-12 RX ORDER — ASPIRIN 81 MG/1
81 TABLET ORAL DAILY
Status: DISCONTINUED | OUTPATIENT
Start: 2022-11-12 | End: 2022-11-16 | Stop reason: HOSPADM

## 2022-11-12 RX ORDER — BISACODYL 10 MG
10 SUPPOSITORY, RECTAL RECTAL DAILY PRN
Status: DISCONTINUED | OUTPATIENT
Start: 2022-11-12 | End: 2022-11-16 | Stop reason: HOSPADM

## 2022-11-12 RX ORDER — POTASSIUM CHLORIDE 7.45 MG/ML
10 INJECTION INTRAVENOUS
Status: DISCONTINUED | OUTPATIENT
Start: 2022-11-12 | End: 2022-11-16 | Stop reason: HOSPADM

## 2022-11-12 RX ORDER — TRAMADOL HYDROCHLORIDE 50 MG/1
50 TABLET ORAL EVERY 12 HOURS PRN
Status: DISCONTINUED | OUTPATIENT
Start: 2022-11-12 | End: 2022-11-16 | Stop reason: HOSPADM

## 2022-11-12 RX ORDER — LEVOTHYROXINE SODIUM 0.05 MG/1
50 TABLET ORAL DAILY
Status: DISCONTINUED | OUTPATIENT
Start: 2022-11-12 | End: 2022-11-16 | Stop reason: HOSPADM

## 2022-11-12 RX ORDER — POTASSIUM CHLORIDE 1.5 G/1.77G
40 POWDER, FOR SOLUTION ORAL AS NEEDED
Status: DISCONTINUED | OUTPATIENT
Start: 2022-11-12 | End: 2022-11-16 | Stop reason: HOSPADM

## 2022-11-12 RX ORDER — POTASSIUM CHLORIDE 1.5 G/1.77G
40 POWDER, FOR SOLUTION ORAL EVERY 4 HOURS
Status: COMPLETED | OUTPATIENT
Start: 2022-11-12 | End: 2022-11-12

## 2022-11-12 RX ORDER — DOCUSATE SODIUM 100 MG/1
200 CAPSULE, LIQUID FILLED ORAL DAILY
Status: DISCONTINUED | OUTPATIENT
Start: 2022-11-12 | End: 2022-11-16 | Stop reason: HOSPADM

## 2022-11-12 RX ORDER — CLOPIDOGREL BISULFATE 75 MG/1
75 TABLET ORAL DAILY
Status: DISCONTINUED | OUTPATIENT
Start: 2022-11-12 | End: 2022-11-16 | Stop reason: HOSPADM

## 2022-11-12 RX ORDER — PANTOPRAZOLE SODIUM 40 MG/1
40 TABLET, DELAYED RELEASE ORAL DAILY
Status: DISCONTINUED | OUTPATIENT
Start: 2022-11-12 | End: 2022-11-16 | Stop reason: HOSPADM

## 2022-11-12 RX ORDER — SENNA PLUS 8.6 MG/1
1 TABLET ORAL DAILY
Status: DISCONTINUED | OUTPATIENT
Start: 2022-11-12 | End: 2022-11-16 | Stop reason: HOSPADM

## 2022-11-12 RX ORDER — ACETAMINOPHEN 500 MG
500 TABLET ORAL EVERY 6 HOURS PRN
Status: DISCONTINUED | OUTPATIENT
Start: 2022-11-12 | End: 2022-11-16 | Stop reason: HOSPADM

## 2022-11-12 RX ORDER — SENNA PLUS 8.6 MG/1
1 TABLET ORAL DAILY PRN
Status: DISCONTINUED | OUTPATIENT
Start: 2022-11-12 | End: 2022-11-16 | Stop reason: HOSPADM

## 2022-11-12 RX ORDER — CARVEDILOL 25 MG/1
25 TABLET ORAL 2 TIMES DAILY WITH MEALS
Status: CANCELLED | OUTPATIENT
Start: 2022-11-12

## 2022-11-12 RX ORDER — POTASSIUM CHLORIDE 1500 MG/1
40 TABLET, FILM COATED, EXTENDED RELEASE ORAL AS NEEDED
Status: DISCONTINUED | OUTPATIENT
Start: 2022-11-12 | End: 2022-11-16 | Stop reason: HOSPADM

## 2022-11-12 RX ORDER — DIPHENOXYLATE HYDROCHLORIDE AND ATROPINE SULFATE 2.5; .025 MG/1; MG/1
1 TABLET ORAL DAILY
Status: DISCONTINUED | OUTPATIENT
Start: 2022-11-12 | End: 2022-11-16 | Stop reason: HOSPADM

## 2022-11-12 RX ADMIN — POTASSIUM CHLORIDE 40 MEQ: 1.5 POWDER, FOR SOLUTION ORAL at 15:01

## 2022-11-12 RX ADMIN — CARBIDOPA AND LEVODOPA 1 TABLET: 10; 100 TABLET ORAL at 20:19

## 2022-11-12 RX ADMIN — METRONIDAZOLE 500 MG: 500 INJECTION, SOLUTION INTRAVENOUS at 17:29

## 2022-11-12 RX ADMIN — AZTREONAM 2 G: 2 INJECTION, POWDER, LYOPHILIZED, FOR SOLUTION INTRAMUSCULAR; INTRAVENOUS at 18:32

## 2022-11-12 RX ADMIN — POTASSIUM CHLORIDE 40 MEQ: 1.5 POWDER, FOR SOLUTION ORAL at 12:25

## 2022-11-12 RX ADMIN — Medication 1 TABLET: at 15:01

## 2022-11-12 RX ADMIN — DOCUSATE SODIUM 200 MG: 100 CAPSULE ORAL at 15:01

## 2022-11-12 RX ADMIN — Medication 10 ML: at 08:14

## 2022-11-12 RX ADMIN — SODIUM CHLORIDE 500 ML: 9 INJECTION, SOLUTION INTRAVENOUS at 04:33

## 2022-11-12 RX ADMIN — SODIUM CHLORIDE 125 ML/HR: 9 INJECTION, SOLUTION INTRAVENOUS at 18:32

## 2022-11-12 RX ADMIN — Medication 750 MG: at 17:29

## 2022-11-12 RX ADMIN — METRONIDAZOLE 500 MG: 500 INJECTION, SOLUTION INTRAVENOUS at 08:14

## 2022-11-12 RX ADMIN — TRAMADOL HYDROCHLORIDE 50 MG: 50 TABLET, COATED ORAL at 20:18

## 2022-11-12 RX ADMIN — CETIRIZINE HYDROCHLORIDE 10 MG: 10 TABLET, FILM COATED ORAL at 15:01

## 2022-11-12 RX ADMIN — AZTREONAM 2 G: 2 INJECTION, POWDER, LYOPHILIZED, FOR SOLUTION INTRAMUSCULAR; INTRAVENOUS at 06:09

## 2022-11-12 RX ADMIN — PANTOPRAZOLE SODIUM 40 MG: 40 TABLET, DELAYED RELEASE ORAL at 15:01

## 2022-11-12 RX ADMIN — Medication 10 ML: at 20:19

## 2022-11-12 RX ADMIN — SENNOSIDES 1 TABLET: 8.6 TABLET, FILM COATED ORAL at 15:00

## 2022-11-12 RX ADMIN — HEPARIN SODIUM 5000 UNITS: 5000 INJECTION INTRAVENOUS; SUBCUTANEOUS at 00:00

## 2022-11-12 RX ADMIN — LEVOTHYROXINE SODIUM 50 MCG: 50 TABLET ORAL at 15:01

## 2022-11-12 RX ADMIN — HEPARIN SODIUM 5000 UNITS: 5000 INJECTION INTRAVENOUS; SUBCUTANEOUS at 20:18

## 2022-11-12 RX ADMIN — DIVALPROEX SODIUM 250 MG: 250 TABLET, DELAYED RELEASE ORAL at 20:19

## 2022-11-12 RX ADMIN — SERTRALINE 50 MG: 50 TABLET, FILM COATED ORAL at 20:19

## 2022-11-12 RX ADMIN — CLOPIDOGREL 75 MG: 75 TABLET, FILM COATED ORAL at 15:01

## 2022-11-12 RX ADMIN — ASPIRIN 81 MG: 81 TABLET, COATED ORAL at 15:01

## 2022-11-12 RX ADMIN — HEPARIN SODIUM 5000 UNITS: 5000 INJECTION INTRAVENOUS; SUBCUTANEOUS at 08:14

## 2022-11-12 RX ADMIN — METRONIDAZOLE 500 MG: 500 INJECTION, SOLUTION INTRAVENOUS at 00:00

## 2022-11-12 NOTE — ED PROVIDER NOTES
"Subjective   History of Present Illness  89 yo female patient presents to the ED by EMS from NH.  Pt daughter is at bedside. Daughter states she went to ortho appt this morning and received a call around 230PM that she was not acting herself and was \"slumped over\" in the wheelchair. Daughter states that she has not been her self for about two weeks. They had went to see her a couple of weeks ago and found to have right wrist swelling into the hand. Unknown mechanism of injury. She had a xray and doppler that was normal the swelling continued to worsen. She was sent to the ED and referred to ortho. Patient has reportedly not been herself since. Pt has been less active and in the bed. She has not been going to the cafeteria or outside like she normal would. Family reports when visiting with her she will fall asleep and \"slump over\" while talking to them.      History provided by:  Patient   used: No        Review of Systems   Constitutional: Negative.    HENT: Negative.    Eyes: Negative.    Respiratory: Negative.    Cardiovascular: Negative.    Gastrointestinal: Negative.    Endocrine: Negative.    Genitourinary: Negative.    Musculoskeletal: Negative.    Skin: Negative.    Allergic/Immunologic: Negative.    Neurological: Negative.    Hematological: Negative.    Psychiatric/Behavioral: Positive for confusion.   All other systems reviewed and are negative.      Past Medical History:   Diagnosis Date   • Arthritis    • Ascending aortic aneurysm 10/6/2016   • Chronic hypokalemia 10/6/2016   • Colonic polyp 10/6/2016   • Dementia (HCC)    • Dyslipidemia 10/6/2016   • Gastroparesis 10/6/2016   • GERD with esophagitis 10/6/2016   • H/O blood clots    • Hypertension 10/6/2016   • IBS (irritable bowel syndrome) 10/6/2016   • IHD (ischemic heart disease) 10/6/2016   • Osteoarthritis 10/6/2016   • Osteoarthritis of left hip 10/6/2016   • Osteoporosis 10/6/2016   • Parkinson's disease (HCC) 10/6/2016   • " Peripheral edema 10/6/2016   • Syncope 10/6/2016       Allergies   Allergen Reactions   • Penicillins Anaphylaxis   • Reglan [Metoclopramide] Anaphylaxis   • Sulfa Antibiotics Anaphylaxis       Past Surgical History:   Procedure Laterality Date   • APPENDECTOMY      As a child   • CATARACT EXTRACTION WITH INTRAOCULAR LENS IMPLANT Bilateral     ,   • CORONARY ANGIOPLASTY WITH STENT PLACEMENT     • CORONARY ARTERY BYPASS GRAFT  2000    x3   • GASTRECTOMY PARTIAL / TOTAL      1970s   • LAPAROSCOPIC CHOLECYSTECTOMY     • SPLENECTOMY     • TONSILLECTOMY AND ADENOIDECTOMY      As a child   • TOTAL ABDOMINAL HYSTERECTOMY WITH SALPINGO OOPHORECTOMY  1973       Family History   Problem Relation Age of Onset   • Tuberculosis Mother    • No Known Problems Father        Social History     Socioeconomic History   • Marital status:    Tobacco Use   • Smoking status: Former     Packs/day: 1.00     Types: Cigarettes     Quit date: 1992     Years since quittin.5   • Smokeless tobacco: Never   Vaping Use   • Vaping Use: Never used   Substance and Sexual Activity   • Alcohol use: No   • Drug use: No   • Sexual activity: Defer           Objective   Physical Exam  Vitals and nursing note reviewed.   Constitutional:       Appearance: She is well-developed and normal weight. She is ill-appearing.   HENT:      Head: Normocephalic and atraumatic.      Mouth/Throat:      Mouth: Mucous membranes are moist.      Pharynx: Oropharynx is clear.   Eyes:      Extraocular Movements: Extraocular movements intact.      Pupils: Pupils are equal, round, and reactive to light.   Cardiovascular:      Rate and Rhythm: Normal rate and regular rhythm.      Heart sounds: Normal heart sounds.   Pulmonary:      Effort: Pulmonary effort is normal.      Breath sounds: Normal breath sounds.   Abdominal:      General: Bowel sounds are normal.      Palpations: Abdomen is soft.   Musculoskeletal:         General: Normal range of  motion.      Cervical back: Normal range of motion and neck supple.   Skin:     General: Skin is warm and dry.      Capillary Refill: Capillary refill takes less than 2 seconds.   Neurological:      Mental Status: She is alert. Mental status is at baseline. She is confused.   Psychiatric:         Mood and Affect: Mood normal.         Speech: Speech normal.         Behavior: Behavior normal.         Procedures           ED Course  ED Course as of 11/11/22 2120   Fri Nov 11, 2022   1624 XR Chest 1 View  IMPRESSION:  1.  CHF/edema.  2.  Mild left basilar airspace disease.     This report was finalized on 11/11/2022 4:20 PM by Dr. Edwin Mike MD.    [ML]   1810 Lactate(!!): 3.2 [ML]   1930 Reassessed after fluid bolus. Patient no longer hypotensive. Lungs clear.  [ML]   1953 CT Upper Extremity Right Without Contrast     IMPRESSION:  Essentially nondiagnostic exam secondary to patient positioning and motion artifact. The right wrist cannot be evaluated on the exam. No other gross acute findings.     Signer Name: Sheldon Jose MD   Signed: 11/11/2022 7:44 PM   Workstation Name: [email or web address]    [facility]        Specimen Collected: 11/11/22 19:44 EST Last Resulted: 11/11/22 19:44 EST           [ML]   1953 CT Chest Without Contrast Diagnostic  IMPRESSION:     1. Mild left basilar atelectasis/infiltrate.  2. 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  3. Cardiomegaly.  4. No gross acute abdominal or pelvic findings.              Signer Name: Sheldon Jose MD   Signed: 11/11/2022 7:40 PM   Workstation Name: [facility]    [facility]        Specimen Collected: 11/11/22 19:40 EST Last Resulted: 11/11/22 19:40 EST         [ML]   1953 CT Abdomen Pelvis Without Contrast        IMPRESSION:     1. Mild left basilar atelectasis/infiltrate.  2. 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  3. Cardiomegaly.  4. No gross acute abdominal or pelvic findings.              Signer Name:  Sheldon Jose MD   Signed: 11/11/2022 7:40 PM   Workstation Name: [id number]    [facility]        Specimen Collected: 11/11/22 19:40 EST Last Resulted: 11/11/22 19:40 EST         [ML]   1954 CT Head Without Contrast  IMPRESSION:     1.  No acute intracranial abnormality.  2.  Chronic microvascular ischemic disease and cerebral atrophy, progressed compared to prior study 6/27/2018.     Signer Name: Pedro Jerome MD   Signed: 11/11/2022 7:36 PM   Workstation Name: [email or web address]    [facility]        Specimen Collected: 11/11/22 19:36 EST Last Resulted: 11/11/22 19:36 EST           [ML]   2059 PT admitted with Dr. Betancourt.  [ML]      ED Course User Index  [ML] Ivy Villalpando PA                                           MDM  Number of Diagnoses or Management Options     Amount and/or Complexity of Data Reviewed  Clinical lab tests: reviewed and ordered  Tests in the radiology section of CPT®: ordered and reviewed  Discuss the patient with other providers: yes    Risk of Complications, Morbidity, and/or Mortality  Presenting problems: moderate  Diagnostic procedures: moderate  Management options: moderate    Patient Progress  Patient progress: improved      Final diagnoses:   Sepsis, due to unspecified organism, unspecified whether acute organ dysfunction present (HCC)   Pneumonia of left lower lobe due to infectious organism   Acute UTI       ED Disposition  ED Disposition     ED Disposition   Decision to Admit    Condition   --    Comment   Level of Care: Progressive Care [20]   Diagnosis: Severe sepsis (HCC) [[id number]]   Admitting Physician: DEVEN BETANCOURT [[id number]]   Attending Physician: DEVEN BETANCOURT [[id number]]   Certification: I Certify That Inpatient Hospital Services Are Medically Necessary For Greater Than 2 Midnights               No follow-up provider specified.       Medication List      No changes were made to your prescriptions during this visit.           Ivy Villalpando, PA  11/11/22 212

## 2022-11-12 NOTE — ED NOTES
Called PCU and they stated that the nurse coming in to take report on the pt has not arrived yet and could not give an ETA for when the nurse would be here.

## 2022-11-12 NOTE — H&P
[facility] Medicine Services  History & Physical    Patient Identification:  Name:  Mairlyn Sood  Age:  89 y.o.  Sex:  female  :  1930  MRN:  [id number]   Visit Number:  [id number]  Primary Care Physician:  Jarod Burnett MD    Subjective     2022   Chief complaint:   Chief Complaint   Patient presents with   • Altered Mental Status     History of presenting illness:      Marilyn Sood is a 89 y.o. female with past medical history significant for ascending aortic aneurysm, coronary artery disease status post three-vessel CABG and stenting, hyperlipidemia, essential hypertension, irritable bowel syndrome, osteoarthritis, Parkinson's disease, dementia, former tobacco abuse who presents to [facility] emergency department with complaints of altered mental status. Patient has underlying dementia and unable to provide a history. Patient's daughter present at bedside who provides majority of history. Patient's daughter reports that she had a recent injury to her right wrist and seen orthopedic surgery in the outpatient setting today who states patient has a suspected fracture and ligament tear. The mechanism of the injury is unknown. Orthopedic surgery is deciding to avoid surgery if possible and recommending to wear a brace.Patient's daughter states that since the injury patient has been unable to get out of bed. Daughter was contacted by the nursing home this evening a couple of hours after patient got back from her orthopedic surgery appointment reporting that the patient was limp and pale. The daughter states that the patient had a low-grade fever (99.8) a couple of days ago. Patient was swabbed for COVID and was negative. The daughter states that the patient did have very foul-smelling urine. She states she has not had any difficulty swallowing or choking on her medications or food, but notes that patient has had decreased oral intake since her injury.  She has had no nausea or vomiting. Denies any cough or obvious increased work of breathing.     The patient has a MOST form documenting that she is DNR/DNI from the nursing home. Daughter is requesting more time to discuss with other family members to decide if she wants to proceed with vasopressors and central line if patient was to become hypotensive again.     Upon arrival to the ED, vital signs were temperature 97.7, heart rate 74, respirations 18, blood pressure 87/64, SPO2 95% on room air.  CMP with glucose 143, CO2 18.6, anion gap 16.4, creatinine 1.28, BUN 46, BUN/creatinine ratio 35.9, EGFR 39.4, albumin 3.29, otherwise unremarkable.  CBC with RDW 16, RDW-SD 54.5, otherwise unremarkable.  CRP 3.92.  Lactate 3.2.  Procalcitonin 0.09.  Troponin T negative x1.  proBNP 1369.  Pending peripheral blood cultures x2.  COVID-19 and influenza A/B swab negative.  Urinalysis with trace ketones, trace blood, 1+ leukocytes, 35 RBC, 6-12 WBC, 1+ bacteria.  Pending urine culture.  Chest x-ray with CHF/edema, mild left basilar airspace disease.  CT chest and abdomen/pelvis with mild left basilar atelectasis/infiltrate, 4 cm ectasia of ascending thoracic aorta, cardiomegaly.  CT head without acute intracranial abnormality, chronic microvascular ischemic disease and cerebral atrophy.  CT right upper extremity essentially nondiagnostic secondary to patient positioning and motion artifact, right wrist cannot be evaluated on exam otherwise no acute findings.    Known Emergency Department medications received prior to my evaluation included IV Azactam, IV Ativan 0.25 mg, IV Zofran 4 mg, 2.497 mL fluid bolus, IV vancomycin.    Patient will be admitted to the PCU for further evaluation and monitoring.     ---------------------------------------------------------------------------------------------------------------------   Review of Systems   Unable to perform ROS: Dementia (History obtained from Daughter (Eri) at bedside)    Constitutional: Positive for activity change (Decreased since recent wrist injury), appetite change (Decreased) and fever (T-max 99.8).   Respiratory: Negative for choking and shortness of breath.    Gastrointestinal: Negative for nausea and vomiting.   Genitourinary:        Reports foul-smelling urine   Musculoskeletal:        Swelling of right upper extremity        ---------------------------------------------------------------------------------------------------------------------   Past Medical History:   Diagnosis Date   • Arthritis    • Ascending aortic aneurysm 10/6/2016   • Chronic hypokalemia 10/6/2016   • Colonic polyp 10/6/2016   • Dementia (HCC)    • Dyslipidemia 10/6/2016   • Gastroparesis 10/6/2016   • GERD with esophagitis 10/6/2016   • H/O blood clots    • Hypertension 10/6/2016   • IBS (irritable bowel syndrome) 10/6/2016   • IHD (ischemic heart disease) 10/6/2016   • Osteoarthritis 10/6/2016   • Osteoarthritis of left hip 10/6/2016   • Osteoporosis 10/6/2016   • Parkinson's disease (HCC) 10/6/2016   • Peripheral edema 10/6/2016   • Syncope 10/6/2016     Past Surgical History:   Procedure Laterality Date   • APPENDECTOMY      As a child   • CATARACT EXTRACTION WITH INTRAOCULAR LENS IMPLANT Bilateral     ,   • CORONARY ANGIOPLASTY WITH STENT PLACEMENT     • CORONARY ARTERY BYPASS GRAFT  2000    x3   • GASTRECTOMY PARTIAL / TOTAL      1970s   • LAPAROSCOPIC CHOLECYSTECTOMY     • SPLENECTOMY     • TONSILLECTOMY AND ADENOIDECTOMY      As a child   • TOTAL ABDOMINAL HYSTERECTOMY WITH SALPINGO OOPHORECTOMY  1973     Family History   Problem Relation Age of Onset   • Tuberculosis Mother    • No Known Problems Father      Social History     Socioeconomic History   • Marital status:    Tobacco Use   • Smoking status: Former     Packs/day: 1.00     Types: Cigarettes     Quit date: 1992     Years since quittin.5   • Smokeless tobacco: Never   Vaping Use   • Vaping Use:  Never used   Substance and Sexual Activity   • Alcohol use: No   • Drug use: No   • Sexual activity: Defer     ---------------------------------------------------------------------------------------------------------------------   Allergies:  Penicillins, Reglan [metoclopramide], and Sulfa antibiotics  ---------------------------------------------------------------------------------------------------------------------   Home medications:    Medications below are reported home medications pulling from within the system; at this time, these medications have not been reconciled unless otherwise specified and are in the verification process for further verifcation as current home medications.  (Not in a hospital admission)      Hospital Scheduled Meds:          Current listed hospital scheduled medications may not yet reflect those currently placed in orders that are signed and held awaiting patient's arrival to floor.   ---------------------------------------------------------------------------------------------------------------------     Objective     Vital Signs:  Temp:  [97.7 °F (36.5 °C)] 97.7 °F (36.5 °C)  Heart Rate:  [70-80] 80  Resp:  [18] 18  BP: ()/(57-73) 113/73      11/11/22  1539   Weight: 49.9 kg (110 lb)     Body mass index is 20.78 kg/m².  ---------------------------------------------------------------------------------------------------------------------       Physical Exam  Constitutional:       General: She is awake.      Appearance: She is normal weight.      Comments: Patient resting in bed.  Becomes agitated when performing exam. Appears in no acute distress.    HENT:      Head: Normocephalic and atraumatic.      Right Ear: External ear normal.      Left Ear: External ear normal.      Nose: Nose normal.      Mouth/Throat:      Mouth: Mucous membranes are dry.      Pharynx: Oropharynx is clear.   Eyes:      Extraocular Movements: Extraocular movements intact.      Conjunctiva/sclera:  Conjunctivae normal.      Pupils: Pupils are equal, round, and reactive to light.   Cardiovascular:      Rate and Rhythm: Normal rate and regular rhythm.      Pulses: Normal pulses.           Radial pulses are 2+ on the right side and 2+ on the left side.        Dorsalis pedis pulses are 2+ on the right side and 2+ on the left side.        Posterior tibial pulses are 2+ on the right side and 2+ on the left side.      Heart sounds: Normal heart sounds. No murmur heard.    No friction rub. No gallop.   Pulmonary:      Effort: Pulmonary effort is normal. No accessory muscle usage or respiratory distress.      Breath sounds: Normal breath sounds. No decreased breath sounds, wheezing, rhonchi or rales.   Abdominal:      General: Abdomen is flat. Bowel sounds are normal. There is no distension.      Palpations: Abdomen is soft.      Tenderness: There is no abdominal tenderness. There is no guarding.   Musculoskeletal:         General: Swelling present.      Cervical back: Normal range of motion and neck supple.      Right lower leg: No edema.      Left lower leg: No edema.      Comments: Right wrist noted to be edematous. Brace in place.    Skin:     General: Skin is warm and dry.      Findings: No erythema or rash.   Neurological:      Mental Status: She is alert.      Comments: Difficult to exam as patient not answering questions or participating in exam. Does have dementia at baseline   Psychiatric:         Behavior: Behavior is uncooperative and agitated.      Comments: Becomes agitated when attempting to examine.          ---------------------------------------------------------------------------------------------------------------------  EKG:     Pending cardiology interpretation.  Per my review, EKG shows normal sinus rhythm with heart rate 72 and  ms without acute ischemic changes.    I have personally looked at both the EKG and the telemetry  strips.  ---------------------------------------------------------------------------------------------------------------------   Results from last 7 days   Lab Units 11/11/22 1938 11/11/22 1646 11/08/22 2111   CRP mg/dL  --  3.92*  --    LACTATE mmol/L 1.1 3.2*  --    WBC 10*3/mm3  --  7.76 11.33*   HEMOGLOBIN g/dL  --  12.0 11.4*   HEMATOCRIT %  --  37.6 36.2   MCV fL  --  92.2 94.0   MCHC g/dL  --  31.9 31.5   PLATELETS 10*3/mm3  --  309 276         Results from last 7 days   Lab Units 11/11/22 1646 11/08/22 2111   SODIUM mmol/L 140 136   POTASSIUM mmol/L 3.7 4.4   CHLORIDE mmol/L 105 101   CO2 mmol/L 18.6* 22.6   BUN mg/dL 46* 25*   CREATININE mg/dL 1.28* 1.05*   CALCIUM mg/dL 9.3 8.9   GLUCOSE mg/dL 143* 111*   ALBUMIN g/dL 3.29* 3.26*   BILIRUBIN mg/dL 0.2 0.4   ALK PHOS U/L 88 73   AST (SGOT) U/L 24 16   ALT (SGPT) U/L 10 <5   Estimated Creatinine Clearance: 22.1 mL/min (A) (by C-G formula based on SCr of 1.28 mg/dL (H)).  No results found for: AMMONIA  Results from last 7 days   Lab Units 11/11/22 1938 11/11/22 1646 11/08/22 2111   CK TOTAL U/L  --   --  48   TROPONIN T ng/mL <0.010 <0.010  --      Results from last 7 days   Lab Units 11/11/22 1646   PROBNP pg/mL 1,369.0     Lab Results   Component Value Date    HGBA1C 6.6 (H) 08/06/2015     Lab Results   Component Value Date    TSH 4.850 (H) 11/11/2022    FREET4 0.72 (L) 11/11/2022     No results found for: PREGTESTUR, PREGSERUM, HCG, HCGQUANT  Pain Management Panel     Pain Management Panel Latest Ref Rng & Units 8/5/2015    AMPHETAMINES SCREEN, URINE NEGATIVE Negative    BARBITURATES SCREEN NEGATIVE Negative    BENZODIAZEPINE SCREEN, URINE NEGATIVE Negative    COCAINE SCREEN, URINE NEGATIVE Negative    METHADONE SCREEN, URINE NEGATIVE Negative            ---------------------------------------------------------------------------------------------------------------------  Imaging Results (Last 7 Days)     Procedure Component Value Units Date/Time     CT Upper Extremity Right Without Contrast [[id number]] Collected: 11/11/22 1944     Updated: 11/11/22 1946    Narrative:      CT UE RT WO    INDICATION:    Right wrist pain and swelling.    TECHNIQUE:   CT of the right upper extremity. without IV contrast. Coronal and sagittal reconstructions were obtained.  Radiation dose reduction techniques included automated exposure control or exposure modulation based on body size. Count of known CT and cardiac  nuc med studies performed in previous 12 months: 0.     COMPARISON:    Right wrist x-rays 11/11/2022 and 11/8/2022    FINDINGS:  Examination is markedly limited secondary to patient positioning and motion artifact. Evaluation of the right wrist is essentially nondiagnostic secondary to motion on the exam. Remainder of the visualized right upper extremity appears grossly  unremarkable without evidence of a displaced fracture or dislocation.      Impression:      Essentially nondiagnostic exam secondary to patient positioning and motion artifact. The right wrist cannot be evaluated on the exam. No other gross acute findings.    Signer Name: Sheldon Jose MD   Signed: 11/11/2022 7:44 PM   [facility]: [facility]    [facility]    CT Chest Without Contrast Diagnostic [[id number]] Collected: 11/11/22 1940     Updated: 11/11/22 1942    Narrative:      CT Chest WO, CT Abdomen Pelvis WO    INDICATION:   Sepsis. Altered mental status. Dementia.    TECHNIQUE:   CT of the chest, abdomen and pelvis without IV contrast. Coronal and sagittal reconstructions were obtained.  Radiation dose reduction techniques included automated exposure control or exposure modulation based on body size. Count of known CT and cardiac  nuc med studies performed in previous 12 months: 0.     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 Extensive atherosclerotic calcification throughout the thoracic aorta.  Cardiomegaly. No pericardial effusions. Prior CABG. Mild left basilar atelectasis/infiltrate. No pleural effusions or pneumothorax.    Abdomen:   The liver is within normal limits. Spleen is not well visualized. Pancreas is grossly unremarkable. Suspected cholecystectomy. Both adrenal glands are normal. Stable right renal cyst. Kidneys are otherwise grossly unremarkable allowing for lack of IV  contrast. Abdominal aorta normal in course and caliber. Small bowel is unremarkable without obstruction. Normal appendix. The colon is unremarkable. No free fluid or free air.    Pelvis:   The urinary bladder is unremarkable.  Pelvic viscera is grossly unremarkable. No free pelvic fluid.    No acute osseous abnormality.        Impression:        1. Mild left basilar atelectasis/infiltrate.  2. 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  3. Cardiomegaly.  4. No gross acute abdominal or pelvic findings.          Signer Name: Sheldon Jose MD   Signed: 11/11/2022 7:40 PM   Workstation [facility]: [email or web address]    [facility] [place]    CT Abdomen Pelvis Without Contrast [[id number]] Collected: 11/11/22 1940     Updated: 11/11/22 1942    Narrative:      CT Chest WO, CT Abdomen Pelvis WO    INDICATION:   Sepsis. Altered mental status. Dementia.    TECHNIQUE:   CT of the chest, abdomen and pelvis without IV contrast. Coronal and sagittal reconstructions were obtained.  Radiation dose reduction techniques included automated exposure control or exposure modulation based on body size. Count of known CT and cardiac  nuc med studies performed in previous 12 months: 0.     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 Extensive atherosclerotic calcification throughout the thoracic aorta. Cardiomegaly. No pericardial effusions. Prior CABG. Mild left basilar atelectasis/infiltrate. No pleural effusions or pneumothorax.    Abdomen:   The liver is within normal limits.  Spleen is not well visualized. Pancreas is grossly unremarkable. Suspected cholecystectomy. Both adrenal glands are normal. Stable right renal cyst. Kidneys are otherwise grossly unremarkable allowing for lack of IV  contrast. Abdominal aorta normal in course and caliber. Small bowel is unremarkable without obstruction. Normal appendix. The colon is unremarkable. No free fluid or free air.    Pelvis:   The urinary bladder is unremarkable.  Pelvic viscera is grossly unremarkable. No free pelvic fluid.    No acute osseous abnormality.        Impression:        1. Mild left basilar atelectasis/infiltrate.  2. 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  3. Cardiomegaly.  4. No gross acute abdominal or pelvic findings.          Signer Name: Sheldon Jose MD   Signed: 11/11/2022 7:40 PM   Workstation Name: [email or web address]    [facility]    CT Head Without Contrast [[id number]] Collected: 11/11/22 1936     Updated: 11/11/22 1938    Narrative:      CT Head WO    HISTORY: AMS    COMPARISON: CT brain 6/27/2018.    TECHNIQUE: CT of the brain without IV contrast. Coronal and sagittal reconstructions were obtained.  Radiation dose reduction techniques included automated exposure control or exposure modulation based on body size. Count of known CT and cardiac nuc med  studies performed in previous 12 months: 0.     Time of Scan: 6:53 PM    FINDINGS:    No acute intracranial hemorrhage, mass effect, midline shift, or hydrocephalus.     Gray-white matter differentiation is within normal limits. Patchy hypodensities in the cerebral white matter, nonspecific but likely representing chronic microvascular ischemic changes.     Ventricles and cortical sulci are prominent, in keeping with cerebral volume loss. Basal cisterns are clear.     Calvarium is intact. Biparietal calvarial thinning.    Visualized paranasal sinuses are clear. Visualized mastoid air cells are clear.      Impression:        1.  No acute  intracranial abnormality.  2.  Chronic microvascular ischemic disease and cerebral atrophy, progressed compared to prior study 6/27/2018.    Signer Name: Pedro Jerome MD   Signed: 11/11/2022 7:36 PM   Workstation Name: [id number]    [facility] [place]    XR Chest 1 View [[id number]] Collected: 11/11/22 1619     Updated: 11/11/22 1622    Narrative:      EXAM:    XR Chest, 1 View     EXAM DATE:    11/11/2022 3:46 PM     CLINICAL HISTORY:    AMS     TECHNIQUE:    Frontal view of the chest.     COMPARISON:    03/16/2019     FINDINGS:    Lungs:  Pulmonary vascular congestion.  Interstitial thickening.  Left  basilar airspace disease.    Pleural space:  Unremarkable.  No pneumothorax.    Heart:  Cardiomegaly.  CABG.    Mediastinum:  Unremarkable.    Bones/joints:  Stable bony structures.       Impression:      1.  CHF/edema.  2.  Mild left basilar airspace disease.     This report was finalized on 11/11/2022 4:20 PM by Dr. Edwin Mike MD.           I have personally reviewed the above radiology images and read the final radiology report on 11/11/22  ---------------------------------------------------------------------------------------------------------------------  Assessment / Plan     Active Hospital Problems    Diagnosis  POA   • **Severe sepsis (HCC) [A41.9, R65.20]  Yes       ASSESSMENT/PLAN:  -Possible left lower lobe pneumonia, HCAP vs aspiration, POA  -Acute urinary tract infection, POA  -Severe sepsis criteria secondary to above with C-RP >2, lactate 3.2, AMS, hypotension responding to fluid rescuscitation, POA  -Anion-gap metabolic acidosis likely due to above, POA  -UA with trace blood, 1+ leukocytes, 3-5 RBC, 6-12 WBC, and 1+ bacteria.  -Urine sample sent for culture, follow-up on results.   -CT chest with mild left basilar atelectasis/infiltrate.   -Procalcitonin normal.   -Received IV Vancomycin and Azactam in ED; will continue on admission and will add IV Flagyl to cover for possible  aspiration.  -Received sepsis bolus in ED (1497 mL); continuous IV fluids ordered.  -Obtain MRSA nasal swab.   -Repeat a.m. CBC, CMP, and C-RP. Trend lactate until normalized.   -SLP consulted to evaluate for dysphagia/silent aspiration.   -Aspiration precautions in place.    -Altered mental status, suspect metabolic encephalopathy 2/2 above, POA  -Parkinson's disease  -Dementia  -Debility  -CT head without acute abnormality.   -Continue treatment as outlined above and monitor for improvement.   -Up with assistance; fall precautions.   -Aspiration precautions in place.   -Palliative care consulted for C discussions.    -Recent right wrist injury, mechanism of injury unknown  -Appointment with orthopedic surgery 11/12 and per daughter's report patient has possible wrist fracture and ligament tear, no surgery recommended at this time, and continue use of wrist brace.  -CT RUE nondiagnostic due to patient position and motion artifact.   -Continue outpatient follow-up with orthopedic surgery.     -Ascending aortic aneurysm, 4 cm  -CT abdomen/pelvis with stable appearance of ascending aortic aneurysm compared to imaging from 08/2020.  -Continue close follow-up and monitoring in the outpatient setting.     -CAD s/p 3 vessel CABG and stenting  -Troponin T negative x 2.   -EKG without acute ischemic changes.   -Review home medications once reconciled.     -Essential hypertension, currently with hypotension  -Hyperlipidemia  -Currently with hypotension (BP 87/54); daughter requesting more time to discuss with family about initiating vasopressors; will start continuous IV fluids in the meantime while awaiting family's decision.   -Monitor per floor protocol.   -Hold home anti-hypertensives until BP stabilizes.     -Hypothyroidism  -TSH 4.850. Free T4 0.72.   -May need dose adjustment of Synthroid once reconciled and repeat thyroid function tests in the outpatient setting.     -Osteoarthritis  -Supportive care.   -Review  home medications once reconciled.    -Irritable bowel syndrome  -Review home medications once reconciled.   -------------------------------  F/E/N: Continuous IV fluids. Replace electrolytes as needed. NPO diet.   DVT prophylaxis: SQ Heparin  Activity: Up with assistance, fall precautions  -------------------------------  CODE STATUS: DNR/DNI; Daughter, Eri, requesting more time to discuss with other family members if want to proceed with vasopressors.    High risk secondary to severe sepsis, acute UTI, possible pneumonia of left lower lobe, metabolic encephalopathy in setting of dementia, recent right wrist injury  -------------------------------  Expected length of stay:  INPATIENT status due to the need for care which can only be reasonably provided in an hospital setting such as aggressive/expedited ancillary services and/or consultation services, the necessity for IV medications, close physician monitoring and/or the possible need for procedures.  In such, I feel patient's risk for adverse outcomes and need for care warrant INPATIENT evaluation and predict the patient's care encounter to likely last beyond 2 midnights.     Disposition: Pending clinical course. Likely back to nursing home once medically stable.    Janet Parker PA-C  11/11/22  22:52 EST    ---------------------------------------------------------------------------------------------------------------------

## 2022-11-12 NOTE — PROGRESS NOTES
[facility] HOSPITALIST PROGRESS NOTE     Patient Identification:  Name:  Marilyn Sood  Age:  89 y.o.  Sex:  female  :  1930  MRN:  [id number]  Visit Number:  [id number]  ROOM: [address]     Primary Care Provider:  Jarod Burnett MD    Length of stay in inpatient status:  1    Subjective     Chief Compliant:    Chief Complaint   Patient presents with   • Altered Mental Status       History of Presenting Illness:    Patient seen and examined with her daughter and granddaughter present at bedside. Patient is confused and unable to give much history. Daughter reports she has had a rapid decline in health over the past week. She says they do not want invasive aggressive interventions like intubation or central line placement, but do want to continue antibiotics and IV fluids, etc.     Objective     Current Hospital Meds:aztreonam, 2 g, Intravenous, Q12H  heparin (porcine), 5,000 Units, Subcutaneous, Q12H  metroNIDAZOLE, 500 mg, Intravenous, Q8H  potassium chloride, 40 mEq, Oral, Q4H  sodium chloride, 10 mL, Intravenous, Q12H  vancomycin, 750 mg, Intravenous, Q24H    sodium chloride, 125 mL/hr, Last Rate: 125 mL/hr (22 06)        Current Antimicrobial Therapy:  Anti-Infectives (From admission, onward)    Ordered     Dose/Rate Route Frequency Start Stop    22 0839  vancomycin 750 mg/250 mL 0.9% NS IVPB ([facility])        Ordering Provider: Althea Ivy DO    750 mg  over 60 Minutes Intravenous Every 24 Hours 22 1800 22 1759    22 2347  aztreonam (AZACTAM) 2 g in sodium chloride 0.9 % 100 mL IVPB-VTB        Ordering Provider: Tyrone Betancourt MD    2 g  over 4 Hours Intravenous Every 12 Hours 22 0600 22 0559    22 2323  metroNIDAZOLE (FLAGYL) IVPB 500 mg        Ordering Provider: Tyrone Betancourt MD    500 mg  over 30 Minutes Intravenous Every 8 Hours 22 0100 22 0059    22 1731  aztreonam (AZACTAM) 2 g in sodium  chloride 0.9 % 100 mL IVPB-VTB        Ordering Provider: Ivy Villalpando PA    2 g  200 mL/hr over 30 Minutes Intravenous Once 11/11/22 1733 11/11/22 1827 11/11/22 1731  vancomycin (VANCOCIN) 1,000 mg in sodium chloride 0.9 % 250 mL IVPB        Ordering Provider: Ivy Villalpando PA    20 mg/kg × 49.9 kg Intravenous Once 11/11/22 1733 11/11/22 2132        Current Diuretic Therapy:  Diuretics (From admission, onward)    None        ----------------------------------------------------------------------------------------------------------------------  Vital Signs:  Temp:  [97.7 °F (36.5 °C)-98.9 °F (37.2 °C)] 98.4 °F (36.9 °C)  Heart Rate:  [65-80] 68  Resp:  [12-18] 18  BP: ()/(43-73) 85/50  SpO2:  [90 %-99 %] 94 %  on   ;   Device (Oxygen Therapy): room air  Body mass index is 21.2 kg/m².    Wt Readings from Last 3 Encounters:   11/12/22 50.9 kg (112 lb 3.4 oz)   11/11/22 48.1 kg (106 lb)   11/08/22 48.1 kg (106 lb)     Intake & Output (last 3 days)       11/09 0701  11/10 0700 11/10 0701  11/11 0700 11/11 0701  11/12 0700 11/12 0701  11/13 0700    P.O.   0     I.V. (mL/kg)   1979.9 (38.9)     IV Piggyback   1847     Total Intake(mL/kg)   3826.9 (75.2)     Net   +3826.9             Urine Unmeasured Occurrence   1 x         Diet Pureed; Thin  ----------------------------------------------------------------------------------------------------------------------  Physical exam:   Constitutional:  Well-developed and well-nourished.  No acute distress.      HENT:  Head:  Normocephalic and atraumatic.   Cardiovascular:  Normal rate, regular rhythm and normal heart sounds with no murmur.  Pulmonary/Chest:  No respiratory distress. Minimal crackles in right lateral lung field  Abdominal:  Soft. No distension and no tenderness.   Musculoskeletal:  Right wrist is in brace.     Neurological:  Resting in bed when I entered the room. She opens eyes and responds verbally to verbal stimulation, but seems  confused.     Skin:  Skin is warm and dry. No rash noted. No pallor.   Peripheral vascular:  No cyanosis, no edema.  Psychiatric: Appropriate mood and affect    ----------------------------------------------------------------------------------------------------------------------  Results from last 7 days   Lab Units 11/12/22 0347 11/11/22 1938 11/11/22 1646 11/08/22  2111   CRP mg/dL 2.85*  --  3.92*  --    LACTATE mmol/L  --  1.1 3.2*  --    WBC 10*3/mm3 13.65*  --  7.76 11.33*   HEMOGLOBIN g/dL 10.4*  --  12.0 11.4*   HEMATOCRIT % 32.7*  --  37.6 36.2   MCV fL 93.2  --  92.2 94.0   MCHC g/dL 31.8  --  31.9 31.5   PLATELETS 10*3/mm3 248  --  309 276         Results from last 7 days   Lab Units 11/12/22 0347 11/11/22 1646 11/08/22  2111   SODIUM mmol/L 142 140 136   POTASSIUM mmol/L 3.4* 3.7 4.4   CHLORIDE mmol/L 111* 105 101   CO2 mmol/L 19.0* 18.6* 22.6   BUN mg/dL 37* 46* 25*   CREATININE mg/dL 0.99 1.28* 1.05*   CALCIUM mg/dL 8.0* 9.3 8.9   GLUCOSE mg/dL 156* 143* 111*   ALBUMIN g/dL 2.52* 3.29* 3.26*   BILIRUBIN mg/dL 0.2 0.2 0.4   ALK PHOS U/L 67 88 73   AST (SGOT) U/L 22 24 16   ALT (SGPT) U/L 10 10 <5   Estimated Creatinine Clearance: 29.1 mL/min (by C-G formula based on SCr of 0.99 mg/dL).  No results found for: AMMONIA  Results from last 7 days   Lab Units 11/11/22  1938 11/11/22  1646 11/08/22  2111   CK TOTAL U/L  --   --  48   TROPONIN T ng/mL <0.010 <0.010  --      Results from last 7 days   Lab Units 11/11/22  1646   PROBNP pg/mL 1,369.0         No results found for: HGBA1C, POCGLU  Lab Results   Component Value Date    TSH 4.850 (H) 11/11/2022    FREET4 0.72 (L) 11/11/2022     No results found for: PREGTESTUR, PREGSERUM, HCG, HCGQUANT  Pain Management Panel     Pain Management Panel Latest Ref Rng & Units 8/5/2015    AMPHETAMINES SCREEN, URINE NEGATIVE Negative    BARBITURATES SCREEN NEGATIVE Negative    BENZODIAZEPINE SCREEN, URINE NEGATIVE Negative    COCAINE SCREEN, URINE NEGATIVE Negative     METHADONE SCREEN, URINE NEGATIVE Negative        Brief Urine Lab Results  (Last result in the past 365 days)      Color   Clarity   Blood   Leuk Est   Nitrite   Protein   CREAT   Urine HCG        11/11/22 1951 Dark Yellow   Clear   Trace   Small (1+)   Negative   Negative               No results found for: BLOODCX  No results found for: URINECX  No results found for: WOUNDCX  No results found for: STOOLCX  No results found for: RESPCX  No results found for: AFBCX  Results from last 7 days   Lab Units 11/12/22  0347 11/11/22  1938 11/11/22  1646   PROCALCITONIN ng/mL  --   --  0.09   LACTATE mmol/L  --  1.1 3.2*   CRP mg/dL 2.85*  --  3.92*       I have personally looked at the labs and they are summarized above.  ----------------------------------------------------------------------------------------------------------------------  Detailed radiology reports for the last 24 hours:  Imaging Results (Last 24 Hours)     Procedure Component Value Units Date/Time    CT Upper Extremity Right Without Contrast [[id number]] Collected: 11/11/22 1944     Updated: 11/11/22 1946    Narrative:      CT UE RT WO    INDICATION:    Right wrist pain and swelling.    TECHNIQUE:   CT of the right upper extremity. without IV contrast. Coronal and sagittal reconstructions were obtained.  Radiation dose reduction techniques included automated exposure control or exposure modulation based on body size. Count of known CT and cardiac  nuc med studies performed in previous 12 months: 0.     COMPARISON:    Right wrist x-rays 11/11/2022 and 11/8/2022    FINDINGS:  Examination is markedly limited secondary to patient positioning and motion artifact. Evaluation of the right wrist is essentially nondiagnostic secondary to motion on the exam. Remainder of the visualized right upper extremity appears grossly  unremarkable without evidence of a displaced fracture or dislocation.      Impression:      Essentially nondiagnostic exam secondary to patient  positioning and motion artifact. The right wrist cannot be evaluated on the exam. No other gross acute findings.    Signer Name: Sheldon Jose MD   Signed: 11/11/2022 7:44 PM   Workstation Name: [email or web address]    [facility]    CT Chest Without Contrast Diagnostic [[id number]] Collected: 11/11/22 1940     Updated: 11/11/22 1942    Narrative:      CT Chest WO, CT Abdomen Pelvis WO    INDICATION:   Sepsis. Altered mental status. Dementia.    TECHNIQUE:   CT of the chest, abdomen and pelvis without IV contrast. Coronal and sagittal reconstructions were obtained.  Radiation dose reduction techniques included automated exposure control or exposure modulation based on body size. Count of known CT and cardiac  nuc med studies performed in previous 12 months: 0.     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 Extensive atherosclerotic calcification throughout the thoracic aorta. Cardiomegaly. No pericardial effusions. Prior CABG. Mild left basilar atelectasis/infiltrate. No pleural effusions or pneumothorax.    Abdomen:   The liver is within normal limits. Spleen is not well visualized. Pancreas is grossly unremarkable. Suspected cholecystectomy. Both adrenal glands are normal. Stable right renal cyst. Kidneys are otherwise grossly unremarkable allowing for lack of IV  contrast. Abdominal aorta normal in course and caliber. Small bowel is unremarkable without obstruction. Normal appendix. The colon is unremarkable. No free fluid or free air.    Pelvis:   The urinary bladder is unremarkable.  Pelvic viscera is grossly unremarkable. No free pelvic fluid.    No acute osseous abnormality.        Impression:        1. Mild left basilar atelectasis/infiltrate.  2. 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  3. Cardiomegaly.  4. No gross acute abdominal or pelvic findings.          Signer Name: Sheldon Jose MD   Signed: 11/11/2022 7:40 PM    Workstation Name: [facility]    [facility]    CT Abdomen Pelvis Without Contrast [[id number]] Collected: 11/11/22 1940     Updated: 11/11/22 1942    Narrative:      CT Chest WO, CT Abdomen Pelvis WO    INDICATION:   Sepsis. Altered mental status. Dementia.    TECHNIQUE:   CT of the chest, abdomen and pelvis without IV contrast. Coronal and sagittal reconstructions were obtained.  Radiation dose reduction techniques included automated exposure control or exposure modulation based on body size. Count of known CT and cardiac  nuc med studies performed in previous 12 months: 0.     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 Extensive atherosclerotic calcification throughout the thoracic aorta. Cardiomegaly. No pericardial effusions. Prior CABG. Mild left basilar atelectasis/infiltrate. No pleural effusions or pneumothorax.    Abdomen:   The liver is within normal limits. Spleen is not well visualized. Pancreas is grossly unremarkable. Suspected cholecystectomy. Both adrenal glands are normal. Stable right renal cyst. Kidneys are otherwise grossly unremarkable allowing for lack of IV  contrast. Abdominal aorta normal in course and caliber. Small bowel is unremarkable without obstruction. Normal appendix. The colon is unremarkable. No free fluid or free air.    Pelvis:   The urinary bladder is unremarkable.  Pelvic viscera is grossly unremarkable. No free pelvic fluid.    No acute osseous abnormality.        Impression:        1. Mild left basilar atelectasis/infiltrate.  2. 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  3. Cardiomegaly.  4. No gross acute abdominal or pelvic findings.          Signer Name: Sheldon Jose MD   Signed: 11/11/2022 7:40 PM   Workstation Name: [facility]    [facility]    CT Head Without Contrast [[id number]] Collected: 11/11/22 1936     Updated: 11/11/22 1938    Narrative:       CT Head WO    HISTORY: AMS    COMPARISON: CT brain 6/27/2018.    TECHNIQUE: CT of the brain without IV contrast. Coronal and sagittal reconstructions were obtained.  Radiation dose reduction techniques included automated exposure control or exposure modulation based on body size. Count of known CT and cardiac nuc med  studies performed in previous 12 months: 0.     Time of Scan: 6:53 PM    FINDINGS:    No acute intracranial hemorrhage, mass effect, midline shift, or hydrocephalus.     Gray-white matter differentiation is within normal limits. Patchy hypodensities in the cerebral white matter, nonspecific but likely representing chronic microvascular ischemic changes.     Ventricles and cortical sulci are prominent, in keeping with cerebral volume loss. Basal cisterns are clear.     Calvarium is intact. Biparietal calvarial thinning.    Visualized paranasal sinuses are clear. Visualized mastoid air cells are clear.      Impression:        1.  No acute intracranial abnormality.  2.  Chronic microvascular ischemic disease and cerebral atrophy, progressed compared to prior study 6/27/2018.    Signer Name: Pedro Jerome MD   Signed: 11/11/2022 7:36 PM   Workstation Name: [id number]    [facility]    XR Chest 1 View [[id number]] Collected: 11/11/22 1619     Updated: 11/11/22 1622    Narrative:      EXAM:    XR Chest, 1 View     EXAM DATE:    11/11/2022 3:46 PM     CLINICAL HISTORY:    AMS     TECHNIQUE:    Frontal view of the chest.     COMPARISON:    03/16/2019     FINDINGS:    Lungs:  Pulmonary vascular congestion.  Interstitial thickening.  Left  basilar airspace disease.    Pleural space:  Unremarkable.  No pneumothorax.    Heart:  Cardiomegaly.  CABG.    Mediastinum:  Unremarkable.    Bones/joints:  Stable bony structures.       Impression:      1.  CHF/edema.  2.  Mild left basilar airspace disease.     This report was finalized on 11/11/2022 4:20 PM by Dr. Edwin Mike MD.           Assessment  & Plan      #Left lower lobe pneumonia, HAP versus aspiration, POA  #Acute urinary tract infection, POA  #Severe sepsis criteria met CRP greater than 2, lactate 3.2, altered mental status, hypotension responding to fluid resuscitation--secondary to UTI and pneumonia, POA  #Anion gap metabolic acidosis likely secondary to above, POA  - CRP improving, lactate normalized  - Azactam, vanc, and flagyl started at admission (patient has anaphylactic allergy to penicillins). MRSA screen ordered, if result is negative then likely can discontinue vancomycin.  - UA suggestive of UTI, urine culture in process.   - Follow up urine culture, blood culture results  - SLP evaluated patient today and diet recommendations made. No evidence of aspiration or silent aspiration noted.   - Repeat cbc, bmp in am    #Altered mental status, suspect metabolic encephalopathy 2/2 above, POA  #Parkinson's disease  #Dementia  #Debility  - No acute abnormality on CT head at admission. Continue treatment of underlying infections.   - Palliative care consulted for [facility] discussions. I discussed treatment goals with family at bedside today also, and they wish to continue treatment of her infections but want to avoid invasive aggressive measures like intubation or a central line.  - Continue home sertraline, depakote, and carbidopa-levodopa    #Right wrist injury  - Unfortunately unable to evaluate with CT due to patient positioning yesterday. Reportedly patient following with Ortho outpatient for possible wrist fracture and ligament tear. Continue brace. Follow up with Ortho outpatient.    #Ascending aortic aneurysm, 4 cm  - Stable in size on CT abd/pelvis at admission  - Monitor. Recommend outpatient follow up.    #Coronary artery disease s/p 3 vessel CABG and stenting  - Troponin negative x2 at admission, EKG without acute ischemic changes.  - Continue home plavix  - Holding home torsemide due to hypotension    #Essential hypertension, currently with  hypotension  #Hyperlipidemia  - Holding antihypertensives due to hypotension. Monitor per hospital protocol. Family does not want central line which would be needed for vasopressors.     #Hypothyroidism  - Continue home levothyroxine  - TSH 4.85, free T4 0.72. May need adjustment in outpatient setting after repeating thyroid function tests.     #Osteoarthritis  - Continue supportive care. Home tramadol reordered but with parameters to hold if SBP<100, HR<60, or signs of respiratory depression.    #Irritable bowel syndrome  - Continue home fortino díaz  Edited by: Althea Ivy DO at 11/12/2022 1324      VTE Prophylaxis:   Mechanical Order History:     None      Pharmalogical Order History:      Ordered     Dose Route Frequency Stop    11/11/22 7063  heparin (porcine) 5000 UNIT/ML injection 5,000 Units         5,000 Units SC Every 12 Hours Scheduled --                Dispo: pending clinical course    Althea Ivy DO  [facility]  11/12/22  11:49 EST

## 2022-11-12 NOTE — PLAN OF CARE
Problem: Fall Injury Risk  Goal: Absence of Fall and Fall-Related Injury  Outcome: Ongoing, Progressing  Intervention: Identify and Manage Contributors  Flowsheets (Taken 11/12/2022 0040)  Medication Review/Management: medications reviewed  Intervention: Promote Injury-Free Environment  Flowsheets  Taken 11/12/2022 0200 by Jose Jarrett PCT  Safety Promotion/Fall Prevention:   activity supervised   assistive device/personal items within reach   clutter free environment maintained   fall prevention program maintained   lighting adjusted   muscle strengthening facilitated   nonskid shoes/slippers when out of bed   room organization consistent   safety round/check completed  Taken 11/12/2022 0040 by Ana Mackey, RN  Safety Promotion/Fall Prevention:   activity supervised   safety round/check completed     Problem: Skin Injury Risk Increased  Goal: Skin Health and Integrity  Outcome: Ongoing, Progressing  Intervention: Promote and Optimize Oral Intake  Flowsheets (Taken 11/12/2022 0333)  Oral Nutrition Promotion: rest periods promoted  Intervention: Optimize Skin Protection  Flowsheets  Taken 11/12/2022 0200 by Jose Jarrett PCT  Head of Bed (HOB) Positioning: HOB elevated  Taken 11/12/2022 0040 by Ana Mackey, RN  Pressure Reduction Techniques: frequent weight shift encouraged  Pressure Reduction Devices: pressure-redistributing mattress utilized  Skin Protection: adhesive use limited     Problem: Behavioral Health Comorbidity  Goal: Maintenance of Behavioral Health Symptom Control  Outcome: Ongoing, Progressing  Intervention: Maintain Behavioral Health Symptom Control  Flowsheets (Taken 11/12/2022 0040)  Medication Review/Management: medications reviewed     Problem: Osteoarthritis Comorbidity  Goal: Maintenance of Osteoarthritis Symptom Control  Outcome: Ongoing, Progressing  Intervention: Maintain Osteoarthritis Symptom Control  Flowsheets  Taken 11/12/2022 0200 by Jose Jarrett  PCT  Activity Management: activity adjusted per tolerance  Taken 11/12/2022 0040 by Ana Mackey RN  Activity Management: activity adjusted per tolerance  Medication Review/Management: medications reviewed     Problem: Pain Chronic (Persistent) (Comorbidity Management)  Goal: Acceptable Pain Control and Functional Ability  Outcome: Ongoing, Progressing  Intervention: Manage Persistent Pain  Flowsheets (Taken 11/12/2022 0040)  Sleep/Rest Enhancement:   awakenings minimized   regular sleep/rest pattern promoted   room darkened  Medication Review/Management: medications reviewed  Intervention: Optimize Psychosocial Wellbeing  Flowsheets (Taken 11/12/2022 0040)  Supportive Measures:   active listening utilized   self-care encouraged  Family/Support System Care:   self-care encouraged   support provided   Goal Outcome Evaluation:

## 2022-11-12 NOTE — THERAPY EVALUATION
Acute Care - Speech Language Pathology   Swallow Initial Evaluation [facility]   CLINICAL DYSPHAGIA ASSESSMENT     Patient Name: Marilyn Sood  : 1930  MRN: [id number]  Today's Date: 2022             Admit Date: 2022    Marilyn Sood  is seen at bedside this am approximately 0945 on PCU-211 to assess safety/efficacy of swallowing fnx, determine safest/least restrictive diet tolerance. Ms Sood is noted w/ two family members present at bedside, daughter and great-granddaughter.     She has a medical hx significant for ascending aortic aneurysm, coronary artery disease status post three-vessel CABG and stenting, hyperlipidemia, essential hypertension, irritable bowel syndrome, osteoarthritis, Parkinson's disease, dementia, and former tobacco abuse. 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     She presented to [facility] ED from her current skilled nursing facility, [facility], w/ complaints of ams. Ms Sood reportedly suffered an injury to her R wrist w/ a suspected fracture and ligament tear. She has been unable to get out of bed since this injury and has demonstrated decreased oral intake since the injury as well. Her daughter reports she tolerates a mechanical soft or puree diet at baseline w/ thin liquids and no hx of thickened liquids. She notes no difficulty w/ thin liquids of which she is aware of. She does note that Ms Sood generally takes her medications crushed as she was noted w/ oral holding of medications when administered whole over the past several weeks to possibly a month.     Social History     Socioeconomic History   • Marital status:    Tobacco Use   • Smoking status: Former     Packs/day: 1.00     Types: Cigarettes     Quit date: 1992     Years since quittin.5   • Smokeless tobacco: Never   Vaping Use   • Vaping Use: Never used   Substance and Sexual Activity   •  Alcohol use: No   • Drug use: No   • Sexual activity: Defer      Radiology Results (last 21 days)    Procedure Component Value Units Date/Time    CT Upper Extremity Right Without Contrast [[id number]] Med as Reviewed   Order Status: Completed Collected: 11/11/22 1944    Updated: 11/11/22 1946   Narrative:     CT UE RT WO     INDICATION:     Right wrist pain and swelling.     TECHNIQUE:   CT of the right upper extremity. without IV contrast. Coronal and sagittal reconstructions were obtained.  Radiation dose reduction techniques included automated exposure control or exposure modulation based on body size. Count of known CT and cardiac   nuc med studies performed in previous 12 months: 0.     COMPARISON:     Right wrist x-rays 11/11/2022 and 11/8/2022     FINDINGS:   Examination is markedly limited secondary to patient positioning and motion artifact. Evaluation of the right wrist is essentially nondiagnostic secondary to motion on the exam. Remainder of the visualized right upper extremity appears grossly   unremarkable without evidence of a displaced fracture or dislocation.    Impression:     Essentially nondiagnostic exam secondary to patient positioning and motion artifact. The right wrist cannot be evaluated on the exam. No other gross acute findings.     Signer Name: Sheldon Jose MD    Signed: 11/11/2022 7:44 PM    Workstation [facility]: [facility]    [facility]    CT Abdomen Pelvis Without Contrast [[id number]] Med as Reviewed   Order Status: Completed Collected: 11/11/22 1940    Updated: 11/11/22 1942   Narrative:     CT Chest WO, CT Abdomen Pelvis WO     INDICATION:   Sepsis. Altered mental status. Dementia.     TECHNIQUE:   CT of the chest, abdomen and pelvis without IV contrast. Coronal and sagittal reconstructions were obtained.  Radiation dose reduction techniques included automated exposure control or exposure modulation based on body size. Count of known CT and cardiac   nuc  med studies performed in previous 12 months: 0.     COMPARISON:   CT abdomen pelvis 2/18/2019     FINDINGS:   Chest:   4 cm ectasia of the ascending thoracic aorta. Extensive atherosclerotic calcification throughout the thoracic aorta. Cardiomegaly. No pericardial effusions. Prior CABG. Mild left basilar atelectasis/infiltrate. No pleural effusions or pneumothorax.     Abdomen:   The liver is within normal limits. Spleen is not well visualized. Pancreas is grossly unremarkable. Suspected cholecystectomy. Both adrenal glands are normal. Stable right renal cyst. Kidneys are otherwise grossly unremarkable allowing for lack of IV   contrast. Abdominal aorta normal in course and caliber. Small bowel is unremarkable without obstruction. Normal appendix. The colon is unremarkable. No free fluid or free air.     Pelvis:   The urinary bladder is unremarkable.  Pelvic viscera is grossly unremarkable. No free pelvic fluid.     No acute osseous abnormality.      Impression:       1. Mild left basilar atelectasis/infiltrate.   2. 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   3. Cardiomegaly.   4. No gross acute abdominal or pelvic findings.     Signer Name: Sheldon Jose MD    Signed: 11/11/2022 7:40 PM    Workstation Name: [email or web address]    [facility]    CT Chest Without Contrast Diagnostic [[id number]] Med as Reviewed   Order Status: Completed Collected: 11/11/22 1940    Updated: 11/11/22 1942   Narrative:     CT Chest WO, CT Abdomen Pelvis WO     INDICATION:   Sepsis. Altered mental status. Dementia.     TECHNIQUE:   CT of the chest, abdomen and pelvis without IV contrast. Coronal and sagittal reconstructions were obtained.  Radiation dose reduction techniques included automated exposure control or exposure modulation based on body size. Count of known CT and cardiac   nuc med studies performed in previous 12 months: 0.     COMPARISON:   CT abdomen pelvis 2/18/2019     FINDINGS:   Chest:   4 cm ectasia  of the ascending thoracic aorta. Extensive atherosclerotic calcification throughout the thoracic aorta. Cardiomegaly. No pericardial effusions. Prior CABG. Mild left basilar atelectasis/infiltrate. No pleural effusions or pneumothorax.     Abdomen:   The liver is within normal limits. Spleen is not well visualized. Pancreas is grossly unremarkable. Suspected cholecystectomy. Both adrenal glands are normal. Stable right renal cyst. Kidneys are otherwise grossly unremarkable allowing for lack of IV   contrast. Abdominal aorta normal in course and caliber. Small bowel is unremarkable without obstruction. Normal appendix. The colon is unremarkable. No free fluid or free air.     Pelvis:   The urinary bladder is unremarkable.  Pelvic viscera is grossly unremarkable. No free pelvic fluid.     No acute osseous abnormality.      Impression:       1. Mild left basilar atelectasis/infiltrate.   2. 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   3. Cardiomegaly.   4. No gross acute abdominal or pelvic findings.     Signer Name: Sheldon Jose MD    Signed: 11/11/2022 7:40 PM    [facility]: [email or web address]    [facility]    CT Head Without Contrast [[id number]] Med as Reviewed   Order Status: Completed Collected: 11/11/22 1936    Updated: 11/11/22 1938   Narrative:     CT Head WO     HISTORY: AMS     COMPARISON: CT brain 6/27/2018.     TECHNIQUE: CT of the brain without IV contrast. Coronal and sagittal reconstructions were obtained.  Radiation dose reduction techniques included automated exposure control or exposure modulation based on body size. Count of known CT and cardiac nuc med   studies performed in previous 12 months: 0.     Time of Scan: 6:53 PM     FINDINGS:     No acute intracranial hemorrhage, mass effect, midline shift, or hydrocephalus.     Gray-white matter differentiation is within normal limits. Patchy hypodensities in the cerebral white matter, nonspecific but likely representing  chronic microvascular ischemic changes.     Ventricles and cortical sulci are prominent, in keeping with cerebral volume loss. Basal cisterns are clear.     Calvarium is intact. Biparietal calvarial thinning.     Visualized paranasal sinuses are clear. Visualized mastoid air cells are clear.    Impression:       1.  No acute intracranial abnormality.   2.  Chronic microvascular ischemic disease and cerebral atrophy, progressed compared to prior study 6/27/2018.     Signer Name: Pedro Jerome MD    Signed: 11/11/2022 7:36 PM    Workstation Name: [id number]    [facility]    XR Chest 1 View [[id number]] Med as Reviewed   Order Status: Completed Collected: 11/11/22 1619    Updated: 11/11/22 1622   Narrative:     EXAM:     XR Chest, 1 View       EXAM DATE:     11/11/2022 3:46 PM       CLINICAL HISTORY:     AMS       TECHNIQUE:     Frontal view of the chest.       COMPARISON:     03/16/2019       FINDINGS:     Lungs:  Pulmonary vascular congestion.  Interstitial thickening.  Left   basilar airspace disease.     Pleural space:  Unremarkable.  No pneumothorax.     Heart:  Cardiomegaly.  CABG.     Mediastinum:  Unremarkable.     Bones/joints:  Stable bony structures.       Impression:     1.  CHF/edema.   2.  Mild left basilar airspace disease.       This report was finalized on 11/11/2022 4:20 PM by Dr. Edwin Mike MD.       XR Wrist 3+ View Right [[id number]] Reviewed: 11/11/22 1210   Order Status: Completed Collected: 11/11/22 0957    Updated: 11/11/22 1000   Narrative:     EXAM:     XR Right Wrist Complete, 3 or More Views       CLINICAL HISTORY:     acute right wrist pain, swelling, osteoarthritis, nursing home   dementia patient, unwitnessed injury. 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       COMPARISON:     11/08/2022       FINDINGS:     Bones/joints:  Chondrocalcinosis and arthritic change but no acute   bony abnormality.  No dislocation.     Soft tissues:  Unremarkable.  No radiopaque foreign body.       Impression:       Chondrocalcinosis and arthritic change but no acute bony abnormality.       This report was finalized on 11/11/2022 9:58 AM by Dr. Kev Souza MD.        XR Wrist 3+ View Right [[id number]] Med as Reviewed   Order Status: Completed Collected: 11/08/22 2041    Updated: 11/08/22 2043   Narrative:     CR Wrist Min 3 Vws RT     INDICATION:   Swelling.     COMPARISON:   None available.     FINDINGS:   AP, lateral, and oblique views of the right wrist.       Diffuse osseous demineralization degrades evaluation for subtle fracture. No definite acute fracture or dislocation. Widening of the scapholunate interval suggestive of scapholunate dissociation. Degenerative changes of the radiocarpal, carpal, and   carpometacarpal joints with chondrocalcinosis of the radiocarpal joint.    Impression:     1.  Advanced degenerative changes of the radiocarpal and carpal joints.   2.  Chondrocalcinosis of the radiocarpal joint.   3.  Widening of the scapholunate interval suggestive of scapholunate dissociation.     Signer Name: Pedro Jerome MD    Signed: 11/8/2022 8:41 PM    Workstation Name: [id number]    [facility]     Diet Orders (active) (From admission, onward)     Start     Ordered    11/11/22 2324  NPO Diet NPO Type: Strict NPO  Diet Effective Now         11/11/22 2323                Ms Sood is observed on room air w/o complications.     Upon SLP entry, Ms Sood is reclined in bed w/ daughter and great-granddaughter present and attentive at bedside. They report pt was sitting up and awake several minutes ago, but has since been  resting. They are agreeable to pt being aroused for dysphagia assessment. Ms Sood is turned toward L side facing great-granddaughter and is notably hard of hearing w/ hearing aid present in R ear. She arouses to moderate verbal and tactile stimuli, however she states she voices fatigue and it is difficult to maintain full a/a status.      Head of bed is positioned upright and and pt maintains turn to L side in bed to accept multiple po presentations of ice chips, puree, and thin liquids via spoon and straw.  Pt is not able to self feed per family members.     Facial/oral structures are symmetrical upon observation. No obvious lingual deviation noted to general observation. Oral mucosa are moist, pink, and clean. Limited dentition is noted. Secretions are clear, thin, and well controlled. OROM/JAYDA is generally weak overall to imitate oral postures. Gag is not assessed. Volitional cough is not assessed as pt does not demonstrate this and laughs when SLP encourages to imitate. Voice is slightly weak in intensity, clear in quality w/ intelligible speech however generally only states short, brief phrases.    Upon po presentations, adequate bolus anticipation and acceptance w/ good labial seal for bolus clearance via spoon bowl and suction via straw. Bolus formation, manipulation and control are generally weak overall and slightly prolonged w/ mixed phasic-rotary mastication pattern. Intermittent piecemeal swallow is demonstrated w/ puree consistency. A-p transit is slightly prolonged w/o significant oral residue remaining.     Pharyngeal swallow is slightly delayed intermittently and some premature loss is suspected. Generally weak hyolaryngeal elevation is noted per palpation. No overt s/s aspiration evidenced across this evaluation. No silent aspiration suspected as pt is w/o changes in vocal quality, respirations or secretions post po presentations. Pt denies odynophagia.      Visit Dx:     ICD-10-CM ICD-9-CM   1.  Sepsis, due to unspecified organism, unspecified whether acute organ dysfunction present ([facility])  A41.9 038.9     995.91   2. Pneumonia of left lower lobe due to infectious organism  J18.9 486   3. Acute UTI  N39.0 599.0     Patient Active Problem List   Diagnosis   • IHD (ischemic heart disease)   • Hypertension   • Dyslipidemia   • Osteoarthritis   • Osteoporosis   • GERD with esophagitis   • Gastroparesis   • IBS (irritable bowel syndrome)   • Parkinson's disease ([facility])   • Colonic polyp   • Peripheral edema   • Dementia ([facility])   • Chronic hypokalemia   • Osteoarthritis of left hip   • Syncope   • Ascending aortic aneurysm   • Ischemic heart disease   • Mixed hyperlipidemia   • Hypertensive urgency   • Acute deep vein thrombosis (DVT) of distal vein of left lower extremity ([facility])   • Lower extremity edema   • Severe sepsis ([facility])     Past Medical History:   Diagnosis Date   • Arthritis    • Ascending aortic aneurysm 10/6/2016   • Chronic hypokalemia 10/6/2016   • Colonic polyp 10/6/2016   • Dementia ([facility])    • Dyslipidemia 10/6/2016   • Gastroparesis 10/6/2016   • GERD with esophagitis 10/6/2016   • H/O blood clots    • Hypertension 10/6/2016   • IBS (irritable bowel syndrome) 10/6/2016   • IHD (ischemic heart disease) 10/6/2016   • Osteoarthritis 10/6/2016   • Osteoarthritis of left hip 10/6/2016   • Osteoporosis 10/6/2016   • Parkinson's disease ([facility]) 10/6/2016   • Peripheral edema 10/6/2016   • Syncope 10/6/2016     Past Surgical History:   Procedure Laterality Date   • APPENDECTOMY      As a child   • CATARACT EXTRACTION WITH INTRAOCULAR LENS IMPLANT Bilateral     2006,2008   • CORONARY ANGIOPLASTY WITH STENT PLACEMENT  2009   • CORONARY ARTERY BYPASS GRAFT  2000    x3   • GASTRECTOMY PARTIAL / TOTAL      1970s   • LAPAROSCOPIC CHOLECYSTECTOMY  2003   • SPLENECTOMY  2007   • TONSILLECTOMY AND ADENOIDECTOMY      As a child   • TOTAL ABDOMINAL HYSTERECTOMY WITH SALPINGO OOPHORECTOMY  1973     Impression:   Ms Sood  presents w/ a generalized mild oropharyngeal dysphagia w/ generally weak and prolonged oral manipulation w/ intermittent piecemeal swallow of puree consistency and suspected premature spillage of thin liquids intermittently. Current dysphagia is felt to be related to her current acute care status and baseline dementia. No s/s aspiration or silent aspiration are evidenced across this assessment.     Per this assessment, Ms Sood is felt to most benefit from a modified po diet of puree solids and thin liquids w/ medications crushed in puree/thins. Family reports a preference for chocolate or strawberry flavors. She will require 1:1 feeding assistance w/ all po intake. Po intake should only be given if Ms Sood is positioned upright and is fully awake.     SLP Recommendation and Plan       Recommendations:  1. Puree solids, thin liquids.    2. Meds crushed in puree/thins.   3. Upright and centered and awake for all po intake.  4. DENIS precautions.  5. Oral care protocol.  6. 1:1 assist w/ feeding.     SLP to f/u for diet safety/tolerance and potential advancement to mechanical soft consistencies.     D/w pt results and recommendations w/ verbal agreement.    D/w RN and MD results and recommendations w/ verbal agreement.    Thank you for allowing me to participate in the care of your patient-  Starr Williamson MS CCC-SLP          EDUCATION  The patient has been educated in the following areas:   Dysphagia (Swallowing Impairment) Oral Care/Hydration Modified Diet Instruction.              Time Calculation:                Starr Williamson MS CCC-SLP  11/12/2022

## 2022-11-12 NOTE — ED NOTES
Called PCU to give report, was told that there wasn't a nurse to take the patient, That an RN has been called in and that PCU would call us back to get report as soon as someone could take the patient, ED 2 lead nurse informed.

## 2022-11-12 NOTE — PLAN OF CARE
Problem: Fall Injury Risk  Goal: Absence of Fall and Fall-Related Injury  Outcome: Ongoing, Progressing  Intervention: Promote Injury-Free Environment  Recent Flowsheet Documentation  Taken 11/12/2022 1500 by Mariana Del Castillo RN  Safety Promotion/Fall Prevention: safety round/check completed  Taken 11/12/2022 1300 by Mariana Del Castillo RN  Safety Promotion/Fall Prevention: safety round/check completed  Taken 11/12/2022 1100 by Mariana Del Castillo RN  Safety Promotion/Fall Prevention: safety round/check completed  Taken 11/12/2022 0900 by Mariana Del Castillo RN  Safety Promotion/Fall Prevention: safety round/check completed  Taken 11/12/2022 0700 by Mariana Del Castillo RN  Safety Promotion/Fall Prevention: safety round/check completed   Pt resting comfortably. Diet advanced today, tolerating well. VSS on RA. Remains mostly nonverbal. NS infusing at 125. Family at bedside. No questions/concerns at this time. WCTM

## 2022-11-12 NOTE — ED NOTES
Pt vomited in bed, Patient was suctioned to ensure no aspiration, patient was cleaned and assisted with repositioning, NADN, Provider made aware, awaiting new orders.

## 2022-11-12 NOTE — PROGRESS NOTES
Pharmacy was consulted to dose vancomycin and aztreonam for pneumonia, sepsis, and an urinary tract infection. Based on an estimated CrCl of 22mL/min, an extended infusion aztreonam dose of 2g q12hr has been ordered. The vancomycin will be intermittently dosed based on levels following the initial 1g loading dose, to maintain therapeutic AUC concentrations of 400-600mg/L.hr. Thank you for the consult. Pharmacy will continue to follow.     Thank you,   Ela Gallagher, PharmD  23:59 EST

## 2022-11-13 ENCOUNTER — APPOINTMENT (OUTPATIENT)
Dept: GENERAL RADIOLOGY | Facility: HOSPITAL | Age: 87
End: 2022-11-13

## 2022-11-13 LAB
ANION GAP SERPL CALCULATED.3IONS-SCNC: 8.8 MMOL/L (ref 5–15)
BASOPHILS # BLD AUTO: 0.1 10*3/MM3 (ref 0–0.2)
BASOPHILS NFR BLD AUTO: 1 % (ref 0–1.5)
BH CV ECHO MEAS - ACS: 1.7 CM
BH CV ECHO MEAS - AI P1/2T: 754.6 MSEC
BH CV ECHO MEAS - AO MAX PG: 8.2 MMHG
BH CV ECHO MEAS - AO MEAN PG: 4 MMHG
BH CV ECHO MEAS - AO ROOT DIAM: 3.5 CM
BH CV ECHO MEAS - AO V2 MAX: 143 CM/SEC
BH CV ECHO MEAS - AO V2 VTI: 32.2 CM
BH CV ECHO MEAS - EDV(CUBED): 67.9 ML
BH CV ECHO MEAS - EDV(MOD-SP4): 52.1 ML
BH CV ECHO MEAS - EF(MOD-SP4): 78.9 %
BH CV ECHO MEAS - ESV(CUBED): 12.9 ML
BH CV ECHO MEAS - ESV(MOD-SP4): 11 ML
BH CV ECHO MEAS - FS: 42.5 %
BH CV ECHO MEAS - IVS/LVPW: 1.01 CM
BH CV ECHO MEAS - IVSD: 0.97 CM
BH CV ECHO MEAS - LA DIMENSION: 4.2 CM
BH CV ECHO MEAS - LAT PEAK E' VEL: 6.7 CM/SEC
BH CV ECHO MEAS - LV DIASTOLIC VOL/BSA (35-75): 35.3 CM2
BH CV ECHO MEAS - LV MASS(C)D: 124.3 GRAMS
BH CV ECHO MEAS - LV SYSTOLIC VOL/BSA (12-30): 7.4 CM2
BH CV ECHO MEAS - LVIDD: 4.1 CM
BH CV ECHO MEAS - LVIDS: 2.34 CM
BH CV ECHO MEAS - LVOT AREA: 3.8 CM2
BH CV ECHO MEAS - LVOT DIAM: 2.2 CM
BH CV ECHO MEAS - LVPWD: 0.96 CM
BH CV ECHO MEAS - MED PEAK E' VEL: 5.1 CM/SEC
BH CV ECHO MEAS - MV A MAX VEL: 110 CM/SEC
BH CV ECHO MEAS - MV E MAX VEL: 114 CM/SEC
BH CV ECHO MEAS - MV E/A: 1.04
BH CV ECHO MEAS - PA ACC TIME: 0.11 SEC
BH CV ECHO MEAS - PA PR(ACCEL): 28.2 MMHG
BH CV ECHO MEAS - RAP SYSTOLE: 10 MMHG
BH CV ECHO MEAS - RVSP: 41.6 MMHG
BH CV ECHO MEAS - SI(MOD-SP4): 27.8 ML/M2
BH CV ECHO MEAS - SV(MOD-SP4): 41.1 ML
BH CV ECHO MEAS - TAPSE (>1.6): 1.74 CM
BH CV ECHO MEAS - TR MAX PG: 31.6 MMHG
BH CV ECHO MEAS - TR MAX VEL: 281 CM/SEC
BH CV ECHO MEASUREMENTS AVERAGE E/E' RATIO: 19.32
BUN SERPL-MCNC: 16 MG/DL (ref 8–23)
BUN/CREAT SERPL: 27.1 (ref 7–25)
CALCIUM SPEC-SCNC: 8.1 MG/DL (ref 8.2–9.6)
CHLORIDE SERPL-SCNC: 118 MMOL/L (ref 98–107)
CO2 SERPL-SCNC: 18.2 MMOL/L (ref 22–29)
CREAT SERPL-MCNC: 0.59 MG/DL (ref 0.57–1)
DEPRECATED RDW RBC AUTO: 62.2 FL (ref 37–54)
EGFRCR SERPLBLD CKD-EPI 2021: 84.7 ML/MIN/1.73
EOSINOPHIL # BLD AUTO: 0.47 10*3/MM3 (ref 0–0.4)
EOSINOPHIL NFR BLD AUTO: 4.7 % (ref 0.3–6.2)
ERYTHROCYTE [DISTWIDTH] IN BLOOD BY AUTOMATED COUNT: 16.8 % (ref 12.3–15.4)
GLUCOSE SERPL-MCNC: 88 MG/DL (ref 65–99)
HCT VFR BLD AUTO: 35.6 % (ref 34–46.6)
HGB BLD-MCNC: 10.7 G/DL (ref 12–15.9)
IMM GRANULOCYTES # BLD AUTO: 0.07 10*3/MM3 (ref 0–0.05)
IMM GRANULOCYTES NFR BLD AUTO: 0.7 % (ref 0–0.5)
LEFT ATRIUM VOLUME INDEX: 35.3 ML/M2
LYMPHOCYTES # BLD AUTO: 1.91 10*3/MM3 (ref 0.7–3.1)
LYMPHOCYTES NFR BLD AUTO: 19.2 % (ref 19.6–45.3)
MAXIMAL PREDICTED HEART RATE: 128 BPM
MCH RBC QN AUTO: 30 PG (ref 26.6–33)
MCHC RBC AUTO-ENTMCNC: 30.1 G/DL (ref 31.5–35.7)
MCV RBC AUTO: 99.7 FL (ref 79–97)
MONOCYTES # BLD AUTO: 0.82 10*3/MM3 (ref 0.1–0.9)
MONOCYTES NFR BLD AUTO: 8.2 % (ref 5–12)
NEUTROPHILS NFR BLD AUTO: 6.57 10*3/MM3 (ref 1.7–7)
NEUTROPHILS NFR BLD AUTO: 66.2 % (ref 42.7–76)
NRBC BLD AUTO-RTO: 0 /100 WBC (ref 0–0.2)
PLATELET # BLD AUTO: 248 10*3/MM3 (ref 140–450)
PMV BLD AUTO: 11 FL (ref 6–12)
POTASSIUM SERPL-SCNC: 4.8 MMOL/L (ref 3.5–5.2)
QT INTERVAL: 452 MS
QTC INTERVAL: 494 MS
RBC # BLD AUTO: 3.57 10*6/MM3 (ref 3.77–5.28)
SODIUM SERPL-SCNC: 145 MMOL/L (ref 136–145)
STRESS TARGET HR: 109 BPM
WBC NRBC COR # BLD: 9.94 10*3/MM3 (ref 3.4–10.8)

## 2022-11-13 PROCEDURE — 25010000002 LORAZEPAM PER 2 MG: Performed by: STUDENT IN AN ORGANIZED HEALTH CARE EDUCATION/TRAINING PROGRAM

## 2022-11-13 PROCEDURE — 25010000002 HEPARIN (PORCINE) PER 1000 UNITS: Performed by: HOSPITALIST

## 2022-11-13 PROCEDURE — 99232 SBSQ HOSP IP/OBS MODERATE 35: CPT | Performed by: STUDENT IN AN ORGANIZED HEALTH CARE EDUCATION/TRAINING PROGRAM

## 2022-11-13 PROCEDURE — 80048 BASIC METABOLIC PNL TOTAL CA: CPT | Performed by: STUDENT IN AN ORGANIZED HEALTH CARE EDUCATION/TRAINING PROGRAM

## 2022-11-13 PROCEDURE — 73110 X-RAY EXAM OF WRIST: CPT

## 2022-11-13 PROCEDURE — 85025 COMPLETE CBC W/AUTO DIFF WBC: CPT | Performed by: STUDENT IN AN ORGANIZED HEALTH CARE EDUCATION/TRAINING PROGRAM

## 2022-11-13 RX ORDER — LORAZEPAM 2 MG/ML
0.25 INJECTION INTRAMUSCULAR 2 TIMES DAILY PRN
Status: DISCONTINUED | OUTPATIENT
Start: 2022-11-13 | End: 2022-11-13

## 2022-11-13 RX ORDER — LORAZEPAM 2 MG/ML
0.25 INJECTION INTRAMUSCULAR EVERY 12 HOURS PRN
Status: DISCONTINUED | OUTPATIENT
Start: 2022-11-13 | End: 2022-11-14

## 2022-11-13 RX ORDER — LORAZEPAM 2 MG/ML
0.25 INJECTION INTRAMUSCULAR ONCE
Status: COMPLETED | OUTPATIENT
Start: 2022-11-13 | End: 2022-11-13

## 2022-11-13 RX ADMIN — METRONIDAZOLE 500 MG: 500 INJECTION, SOLUTION INTRAVENOUS at 08:01

## 2022-11-13 RX ADMIN — SERTRALINE 50 MG: 50 TABLET, FILM COATED ORAL at 21:57

## 2022-11-13 RX ADMIN — LORAZEPAM 0.25 MG: 2 INJECTION INTRAMUSCULAR; INTRAVENOUS at 18:39

## 2022-11-13 RX ADMIN — CETIRIZINE HYDROCHLORIDE 10 MG: 10 TABLET, FILM COATED ORAL at 08:01

## 2022-11-13 RX ADMIN — AZTREONAM 2 G: 2 INJECTION, POWDER, LYOPHILIZED, FOR SOLUTION INTRAMUSCULAR; INTRAVENOUS at 21:56

## 2022-11-13 RX ADMIN — CARBIDOPA AND LEVODOPA 1 TABLET: 10; 100 TABLET ORAL at 08:01

## 2022-11-13 RX ADMIN — DOCUSATE SODIUM 200 MG: 100 CAPSULE ORAL at 08:01

## 2022-11-13 RX ADMIN — METRONIDAZOLE 500 MG: 500 INJECTION, SOLUTION INTRAVENOUS at 17:25

## 2022-11-13 RX ADMIN — HEPARIN SODIUM 5000 UNITS: 5000 INJECTION INTRAVENOUS; SUBCUTANEOUS at 21:57

## 2022-11-13 RX ADMIN — AZTREONAM 2 G: 2 INJECTION, POWDER, LYOPHILIZED, FOR SOLUTION INTRAMUSCULAR; INTRAVENOUS at 13:41

## 2022-11-13 RX ADMIN — ASPIRIN 81 MG: 81 TABLET, COATED ORAL at 08:01

## 2022-11-13 RX ADMIN — Medication 1 TABLET: at 08:01

## 2022-11-13 RX ADMIN — HEPARIN SODIUM 5000 UNITS: 5000 INJECTION INTRAVENOUS; SUBCUTANEOUS at 08:01

## 2022-11-13 RX ADMIN — METRONIDAZOLE 500 MG: 500 INJECTION, SOLUTION INTRAVENOUS at 03:34

## 2022-11-13 RX ADMIN — Medication 10 ML: at 21:57

## 2022-11-13 RX ADMIN — CLOPIDOGREL 75 MG: 75 TABLET, FILM COATED ORAL at 08:01

## 2022-11-13 RX ADMIN — Medication 10 ML: at 08:02

## 2022-11-13 RX ADMIN — PANTOPRAZOLE SODIUM 40 MG: 40 TABLET, DELAYED RELEASE ORAL at 08:01

## 2022-11-13 RX ADMIN — LORAZEPAM 0.25 MG: 2 INJECTION INTRAMUSCULAR; INTRAVENOUS at 08:24

## 2022-11-13 RX ADMIN — DIVALPROEX SODIUM 250 MG: 250 TABLET, DELAYED RELEASE ORAL at 21:57

## 2022-11-13 RX ADMIN — LEVOTHYROXINE SODIUM 50 MCG: 50 TABLET ORAL at 08:01

## 2022-11-13 RX ADMIN — SODIUM CHLORIDE 125 ML/HR: 9 INJECTION, SOLUTION INTRAVENOUS at 11:59

## 2022-11-13 RX ADMIN — SENNOSIDES 1 TABLET: 8.6 TABLET, FILM COATED ORAL at 08:01

## 2022-11-13 RX ADMIN — AZTREONAM 2 G: 2 INJECTION, POWDER, LYOPHILIZED, FOR SOLUTION INTRAMUSCULAR; INTRAVENOUS at 06:22

## 2022-11-13 RX ADMIN — SODIUM CHLORIDE 125 ML/HR: 9 INJECTION, SOLUTION INTRAVENOUS at 03:34

## 2022-11-13 RX ADMIN — CARBIDOPA AND LEVODOPA 1 TABLET: 10; 100 TABLET ORAL at 21:57

## 2022-11-13 NOTE — PROGRESS NOTES
"    [facility] HOSPITALIST PROGRESS NOTE     Patient Identification:  Name:  Marilyn Sood  Age:  89 y.o.  Sex:  female  :  1930  MRN:  [id number]  Visit Number:  [id number]  ROOM: [address]     Primary Care Provider:  Jarod Burnett MD    Length of stay in inpatient status:  2    Subjective     Chief Compliant:    Chief Complaint   Patient presents with   • Altered Mental Status       History of Presenting Illness:    Patient seen and examined this morning in PCU room 211, no family present at bedside. I spoke to her am nurse, DALIA Peña earlier this morning and she reported that patient was very agitated and distressed due to confusion. Said that her daughter reported she needed a dose of ativan in the ER for similar symptoms, and she \"hated to see her that way.\" One time low dose ativan given and patient has been resting comfortably since then. On my exam patient is sleeping soundly and doesn't give helpful history at this time.    Objective     Current Hospital Meds:aspirin, 81 mg, Oral, Daily  aztreonam, 2 g, Intravenous, Q12H  carbidopa-levodopa, 1 tablet, Oral, BID  cetirizine, 10 mg, Oral, Daily  clopidogrel, 75 mg, Oral, Daily  divalproex, 250 mg, Oral, Nightly  docusate sodium, 200 mg, Oral, Daily  heparin (porcine), 5,000 Units, Subcutaneous, Q12H  levothyroxine, 50 mcg, Oral, Daily  metroNIDAZOLE, 500 mg, Intravenous, Q8H  multivitamin, 1 tablet, Oral, Daily  pantoprazole, 40 mg, Oral, Daily  senna, 1 tablet, Oral, Daily  sertraline, 50 mg, Oral, Nightly  sodium chloride, 10 mL, Intravenous, Q12H    sodium chloride, 125 mL/hr, Last Rate: 125 mL/hr (22 0334)        Current Antimicrobial Therapy:  Anti-Infectives (From admission, onward)    Ordered     Dose/Rate Route Frequency Start Stop    22 6264  aztreonam (AZACTAM) 2 g in sodium chloride 0.9 % 100 mL IVPB-VTB        Ordering Provider: Tyrone Betancourt MD    2 g  over 4 Hours Intravenous Every 12 Hours " 11/12/22 0600 11/17/22 0559    11/11/22 2323  metroNIDAZOLE (FLAGYL) IVPB 500 mg        Ordering Provider: Tyrone Betancourt MD    500 mg  over 30 Minutes Intravenous Every 8 Hours 11/12/22 0100 11/17/22 0059    11/11/22 1731  aztreonam (AZACTAM) 2 g in sodium chloride 0.9 % 100 mL IVPB-VTB        Ordering Provider: Ivy Villalpando PA    2 g  200 mL/hr over 30 Minutes Intravenous Once 11/11/22 1733 11/11/22 1827    11/11/22 1731  vancomycin (VANCOCIN) 1,000 mg in sodium chloride 0.9 % 250 mL IVPB        Ordering Provider: Ivy Villalpando PA    20 mg/kg × 49.9 kg Intravenous Once 11/11/22 1733 11/11/22 2132        Current Diuretic Therapy:  Diuretics (From admission, onward)    None        ----------------------------------------------------------------------------------------------------------------------  Vital Signs:  Temp:  [97.8 °F (36.6 °C)-98.6 °F (37 °C)] 98.6 °F (37 °C)  Heart Rate:  [58-71] 58  Resp:  [16-18] 18  BP: ()/() 135/82  SpO2:  [92 %-96 %] 94 %  on   ;   Device (Oxygen Therapy): room air  Body mass index is 22.67 kg/m².    Wt Readings from Last 3 Encounters:   11/13/22 54.4 kg (120 lb)   11/11/22 48.1 kg (106 lb)   11/08/22 48.1 kg (106 lb)     Intake & Output (last 3 days)       11/10 0701 11/11 0700 11/11 0701 11/12 0700 11/12 0701 11/13 0700 11/13 0701 11/14 0700    P.O.  0 120 0    I.V. (mL/kg)  1979.9 (38.9) 3000 (55.1)     IV Piggyback  1847 550     Total Intake(mL/kg)  3826.9 (75.2) 3670 (67.5) 0 (0)    Urine (mL/kg/hr)   600 (0.5) 500 (2)    Total Output   600 500    Net  +3826.9 +3070 -500            Urine Unmeasured Occurrence  1 x 1 x         Diet Pureed; Thin  ----------------------------------------------------------------------------------------------------------------------  Physical exam:   Constitutional:  Well-developed and well-nourished.  No acute distress.      HENT:  Head:  Normocephalic and atraumatic.  Mouth:  Moist mucous membranes.     Cardiovascular:  Normal rate, regular rhythm and normal heart sounds with no murmur.  Pulmonary/Chest:  No respiratory distress. No wheeze. Coarse breath sounds bilaterally  Abdominal:  Soft. No distension and no tenderness.   Musculoskeletal:  No tenderness, and no deformity.  No red or swollen joints anywhere.    Neurological:  Sleeping soundly    Skin:  Skin is warm and dry.   Peripheral vascular:  No cyanosis, no edema.    ----------------------------------------------------------------------------------------------------------------------  Results from last 7 days   Lab Units 11/13/22  0805 11/12/22 0347 11/11/22 1938 11/11/22  1646   CRP mg/dL  --  2.85*  --  3.92*   LACTATE mmol/L  --   --  1.1 3.2*   WBC 10*3/mm3 9.94 13.65*  --  7.76   HEMOGLOBIN g/dL 10.7* 10.4*  --  12.0   HEMATOCRIT % 35.6 32.7*  --  37.6   MCV fL 99.7* 93.2  --  92.2   MCHC g/dL 30.1* 31.8  --  31.9   PLATELETS 10*3/mm3 248 248  --  309         Results from last 7 days   Lab Units 11/13/22  0805 11/12/22 2053 11/12/22 0347 11/11/22  1646 11/08/22  2111   SODIUM mmol/L 145  --  142 140 136   POTASSIUM mmol/L 4.8 4.5 3.4* 3.7 4.4   CHLORIDE mmol/L 118*  --  111* 105 101   CO2 mmol/L 18.2*  --  19.0* 18.6* 22.6   BUN mg/dL 16  --  37* 46* 25*   CREATININE mg/dL 0.59  --  0.99 1.28* 1.05*   CALCIUM mg/dL 8.1*  --  8.0* 9.3 8.9   GLUCOSE mg/dL 88  --  156* 143* 111*   ALBUMIN g/dL  --   --  2.52* 3.29* 3.26*   BILIRUBIN mg/dL  --   --  0.2 0.2 0.4   ALK PHOS U/L  --   --  67 88 73   AST (SGOT) U/L  --   --  22 24 16   ALT (SGPT) U/L  --   --  10 10 <5   Estimated Creatinine Clearance: 52.2 mL/min (by C-G formula based on SCr of 0.59 mg/dL).  No results found for: AMMONIA  Results from last 7 days   Lab Units 11/11/22  1938 11/11/22  1646 11/08/22  2111   CK TOTAL U/L  --   --  48   TROPONIN T ng/mL <0.010 <0.010  --      Results from last 7 days   Lab Units 11/11/22  1646   PROBNP pg/mL 1,369.0         No results found for: HGBA1C,  POCGLU  Lab Results   Component Value Date    TSH 4.850 (H) 11/11/2022    FREET4 0.72 (L) 11/11/2022     No results found for: PREGTESTUR, PREGSERUM, HCG, HCGQUANT  Pain Management Panel     Pain Management Panel Latest Ref Rng & Units 8/5/2015    AMPHETAMINES SCREEN, URINE NEGATIVE Negative    BARBITURATES SCREEN NEGATIVE Negative    BENZODIAZEPINE SCREEN, URINE NEGATIVE Negative    COCAINE SCREEN, URINE NEGATIVE Negative    METHADONE SCREEN, URINE NEGATIVE Negative        Brief Urine Lab Results  (Last result in the past 365 days)      Color   Clarity   Blood   Leuk Est   Nitrite   Protein   CREAT   Urine HCG        11/11/22 1951 Dark Yellow   Clear   Trace   Small (1+)   Negative   Negative               Blood Culture   Date Value Ref Range Status   11/11/2022 No growth at 24 hours  Preliminary   11/11/2022 No growth at 24 hours  Preliminary     Urine Culture   Date Value Ref Range Status   11/11/2022 <10,000 CFU/mL Normal Urogenital Amira  Final     No results found for: WOUNDCX  No results found for: STOOLCX  No results found for: RESPCX  No results found for: AFBCX  Results from last 7 days   Lab Units 11/12/22  0347 11/11/22  1938 11/11/22  1646   PROCALCITONIN ng/mL  --   --  0.09   LACTATE mmol/L  --  1.1 3.2*   CRP mg/dL 2.85*  --  3.92*       I have personally looked at the labs and they are summarized above.  ----------------------------------------------------------------------------------------------------------------------  Detailed radiology reports for the last 24 hours:  Imaging Results (Last 24 Hours)     ** No results found for the last 24 hours. **        Assessment & Plan    #Left lower lobe pneumonia, HAP versus aspiration, present on admission   #Abnormal UA, present on admission   #Severe sepsis criteria met CRP greater than 2, lactate 3.2, altered mental status, hypotension responding to fluid resuscitation--secondary to UTI and pneumonia, present on admission   #Anion gap metabolic  acidosis likely secondary to above, present on admission   - Patient much improved today, no longer hypotensive  - CRP improving, lactate normalized  - Azactam, vanc, and flagyl started at admission (patient has anaphylactic allergy to penicillins). MRSA screen negative so will discontinue vancomycin today. Continue azactam and flagyl.  - UA was suggestive of UTI on admission, but urine culture has now resulted with growth of <10,000 CFU/mL normal urogenital edilberto, ruling out clinically significant UTI.   - Follow up blood culture results  - SLP evaluated patient and diet recommendations have been made. No evidence of aspiration or silent aspiration noted during their evaluation.  - Repeat cbc, bmp in am    #Altered mental status, suspect metabolic encephalopathy 2/2 above, POA  #Parkinson's disease  #Dementia  #Debility  - No acute abnormality on CT head at admission. Continue treatment of underlying infections.   - Palliative care consulted for [facility] discussions. I discussed treatment goals with family at bedside today also, and they wish to continue treatment of her infections but want to avoid invasive aggressive measures like intubation or a central line.  - Continue home sertraline, depakote, and carbidopa-levodopa  - One time low dose ativan needed today due to significant agitation and patient distress    #Right wrist injury  - Unfortunately unable to evaluate with CT due to patient positioning yesterday. Reportedly patient following with Ortho outpatient for possible wrist fracture and ligament tear. Continue brace. Follow up with Ortho outpatient.    #Ascending aortic aneurysm, 4 cm  - Stable in size on CT abd/pelvis at admission  - Monitor. Recommend outpatient follow up.    #Coronary artery disease s/p 3 vessel CABG and stenting  - Troponin negative x2 at admission, EKG without acute ischemic changes.  - Continue home plavix  - Home torsemide held at admission due to hypotension. Monitor fluid status  closely. Currently appears euvolemic so will continue to hold, but consider restarting in the next few days.    #Essential hypertension, with hypotension on admission  #Hypotension present on admission, now resolved  #Hyperlipidemia  - Holding antihypertensives due to hypotension. Monitor per hospital protocol. Family did not want central line which would be needed for vasopressors. BP is much improved but still within normal range. Restart antihypertensives if it is staying significantly elevated.    #Hypothyroidism  - Continue home levothyroxine  - TSH 4.85, free T4 0.72. May need adjustment in outpatient setting after repeating thyroid function tests.     #Osteoarthritis  - Continue supportive care. Home tramadol reordered but with parameters to hold if SBP<100, HR<60, or signs of respiratory depression.    #Irritable bowel syndrome  - Continue home dulcolax, senna  Edited by: Althea Ivy DO at 11/13/2022 1134    VTE Prophylaxis:   Mechanical Order History:     None      Pharmalogical Order History:      Ordered     Dose Route Frequency Stop    11/11/22 6813  heparin (porcine) 5000 UNIT/ML injection 5,000 Units         5,000 Units SC Every 12 Hours Scheduled --                Dispo: pending clinical improvement    Althea Ivy DO  [facility]  11/13/22  11:34 EST

## 2022-11-13 NOTE — PLAN OF CARE
Problem: Fall Injury Risk  Goal: Absence of Fall and Fall-Related Injury  Outcome: Ongoing, Progressing  Intervention: Identify and Manage Contributors  Recent Flowsheet Documentation  Taken 11/13/2022 0300 by Ana Mackey RN  Medication Review/Management: medications reviewed  Taken 11/13/2022 0100 by Ana Mackey RN  Medication Review/Management: medications reviewed  Taken 11/12/2022 2300 by Ana Mackey RN  Medication Review/Management: medications reviewed  Taken 11/12/2022 2100 by Ana Mackey RN  Medication Review/Management: medications reviewed  Taken 11/12/2022 2025 by Ana Mackey RN  Medication Review/Management: medications reviewed  Intervention: Promote Injury-Free Environment  Recent Flowsheet Documentation  Taken 11/13/2022 0300 by Ana Mackey RN  Safety Promotion/Fall Prevention:   activity supervised   safety round/check completed  Taken 11/13/2022 0100 by Ana Mackey RN  Safety Promotion/Fall Prevention:   activity supervised   safety round/check completed  Taken 11/12/2022 2300 by Ana Mackey RN  Safety Promotion/Fall Prevention:   activity supervised   safety round/check completed  Taken 11/12/2022 2100 by Ana Mackey RN  Safety Promotion/Fall Prevention:   activity supervised   safety round/check completed  Taken 11/12/2022 2025 by Ana Mackey RN  Safety Promotion/Fall Prevention:   activity supervised   safety round/check completed  Taken 11/12/2022 1900 by Ana Mackey RN  Safety Promotion/Fall Prevention:   activity supervised   safety round/check completed     Problem: Skin Injury Risk Increased  Goal: Skin Health and Integrity  Outcome: Ongoing, Progressing  Intervention: Promote and Optimize Oral Intake  Recent Flowsheet Documentation  Taken 11/12/2022 2025 by Ana Mackey RN  Oral Nutrition Promotion: rest periods promoted  Intervention: Optimize Skin Protection  Recent Flowsheet Documentation  Taken  11/12/2022 2025 by Ana Mackey RN  Pressure Reduction Techniques: weight shift assistance provided  Pressure Reduction Devices: pressure-redistributing mattress utilized  Skin Protection: adhesive use limited     Problem: Behavioral Health Comorbidity  Goal: Maintenance of Behavioral Health Symptom Control  Outcome: Ongoing, Progressing  Intervention: Maintain Behavioral Health Symptom Control  Recent Flowsheet Documentation  Taken 11/13/2022 0300 by Ana Mackey RN  Medication Review/Management: medications reviewed  Taken 11/13/2022 0100 by Ana Mackey RN  Medication Review/Management: medications reviewed  Taken 11/12/2022 2300 by Ana Mackey RN  Medication Review/Management: medications reviewed  Taken 11/12/2022 2100 by Ana Mackey RN  Medication Review/Management: medications reviewed  Taken 11/12/2022 2025 by Ana Mackey RN  Medication Review/Management: medications reviewed     Problem: Osteoarthritis Comorbidity  Goal: Maintenance of Osteoarthritis Symptom Control  Outcome: Ongoing, Progressing  Intervention: Maintain Osteoarthritis Symptom Control  Recent Flowsheet Documentation  Taken 11/13/2022 0300 by Ana Mackey RN  Medication Review/Management: medications reviewed  Taken 11/13/2022 0100 by Ana Mackey RN  Medication Review/Management: medications reviewed  Taken 11/12/2022 2300 by Ana Mackey RN  Medication Review/Management: medications reviewed  Taken 11/12/2022 2100 by Ana Mackey RN  Medication Review/Management: medications reviewed  Taken 11/12/2022 2025 by Ana Mackey RN  Activity Management: activity adjusted per tolerance  Medication Review/Management: medications reviewed     Problem: Pain Chronic (Persistent) (Comorbidity Management)  Goal: Acceptable Pain Control and Functional Ability  Outcome: Ongoing, Progressing  Intervention: Manage Persistent Pain  Recent Flowsheet Documentation  Taken 11/13/2022 0300 by  Ana Mackey RN  Medication Review/Management: medications reviewed  Taken 11/13/2022 0100 by Ana Mackey RN  Medication Review/Management: medications reviewed  Taken 11/12/2022 2300 by Ana Mackey RN  Medication Review/Management: medications reviewed  Taken 11/12/2022 2100 by Ana Mackey RN  Medication Review/Management: medications reviewed  Taken 11/12/2022 2025 by Ana Mackey RN  Medication Review/Management: medications reviewed  Intervention: Optimize Psychosocial Wellbeing  Recent Flowsheet Documentation  Taken 11/12/2022 2025 by Ana Mackey RN  Supportive Measures:   active listening utilized   self-care encouraged  Family/Support System Care: self-care encouraged     Problem: Adult Inpatient Plan of Care  Goal: Plan of Care Review  Outcome: Ongoing, Progressing  Flowsheets (Taken 11/13/2022 0423)  Progress: improving  Plan of Care Reviewed With: family  Goal: Patient-Specific Goal (Individualized)  Outcome: Ongoing, Progressing  Goal: Absence of Hospital-Acquired Illness or Injury  Outcome: Ongoing, Progressing  Intervention: Identify and Manage Fall Risk  Flowsheets  Taken 11/13/2022 0300  Safety Promotion/Fall Prevention:   activity supervised   safety round/check completed  Taken 11/13/2022 0100  Safety Promotion/Fall Prevention:   activity supervised   safety round/check completed  Taken 11/12/2022 2300  Safety Promotion/Fall Prevention:   activity supervised   safety round/check completed  Taken 11/12/2022 2100  Safety Promotion/Fall Prevention:   activity supervised   safety round/check completed  Taken 11/12/2022 2025  Safety Promotion/Fall Prevention:   activity supervised   safety round/check completed  Taken 11/12/2022 1900  Safety Promotion/Fall Prevention:   activity supervised   safety round/check completed  Intervention: Prevent Skin Injury  Flowsheets  Taken 11/13/2022 0200 by Denia Salinas PCT  Body Position:   turned   right  Taken 11/12/2022  2025 by Ana Mackey, RN  Skin Protection: adhesive use limited  Intervention: Prevent and Manage VTE (Venous Thromboembolism) Risk  Flowsheets  Taken 11/13/2022 0200 by Denia Salinas PCT  Activity Management: activity adjusted per tolerance  Taken 11/12/2022 2025 by Ana Mackey, RN  Activity Management: activity adjusted per tolerance  Intervention: Prevent Infection  Flowsheets  Taken 11/12/2022 2300  Infection Prevention:   rest/sleep promoted   single patient room provided  Taken 11/12/2022 2100  Infection Prevention:   rest/sleep promoted   single patient room provided  Taken 11/12/2022 2025  Infection Prevention: rest/sleep promoted  Goal: Optimal Comfort and Wellbeing  Outcome: Ongoing, Progressing  Intervention: Provide Person-Centered Care  Flowsheets (Taken 11/12/2022 2025)  Trust Relationship/Rapport:   care explained   questions encouraged   questions answered  Goal: Readiness for Transition of Care  Outcome: Ongoing, Progressing  Intervention: Mutually Develop Transition Plan  Flowsheets  Taken 11/11/2022 2337 by Shahrzad Luque, RN  Transportation Anticipated: health plan transportation  Patient/Family Anticipated Services at Transition:   Patient/Family Anticipates Transition to: long-term care facility  Taken 11/11/2022 2335 by Shahrzad Luque, RN  Equipment Currently Used at Home:   hospital bed   wheelchair   Goal Outcome Evaluation:  Plan of Care Reviewed With: family        Progress: improving

## 2022-11-13 NOTE — PLAN OF CARE
Problem: Adult Inpatient Plan of Care  Goal: Plan of Care Review  Outcome: Ongoing, Progressing   Pt resting comfortably at this time. VSS on RA. Family at bedside. Intermittent agitation. No s/s pain/discomfort at this time. WCTM

## 2022-11-14 PROCEDURE — 99233 SBSQ HOSP IP/OBS HIGH 50: CPT | Performed by: STUDENT IN AN ORGANIZED HEALTH CARE EDUCATION/TRAINING PROGRAM

## 2022-11-14 PROCEDURE — 25010000002 ENOXAPARIN PER 10 MG: Performed by: STUDENT IN AN ORGANIZED HEALTH CARE EDUCATION/TRAINING PROGRAM

## 2022-11-14 PROCEDURE — 25010000002 HEPARIN (PORCINE) PER 1000 UNITS: Performed by: STUDENT IN AN ORGANIZED HEALTH CARE EDUCATION/TRAINING PROGRAM

## 2022-11-14 PROCEDURE — 92610 EVALUATE SWALLOWING FUNCTION: CPT

## 2022-11-14 PROCEDURE — 25010000002 LORAZEPAM PER 2 MG: Performed by: STUDENT IN AN ORGANIZED HEALTH CARE EDUCATION/TRAINING PROGRAM

## 2022-11-14 RX ORDER — QUETIAPINE FUMARATE 25 MG/1
12.5 TABLET, FILM COATED ORAL NIGHTLY
Status: DISCONTINUED | OUTPATIENT
Start: 2022-11-14 | End: 2022-11-15

## 2022-11-14 RX ORDER — CHOLECALCIFEROL (VITAMIN D3) 125 MCG
5 CAPSULE ORAL NIGHTLY PRN
Status: DISCONTINUED | OUTPATIENT
Start: 2022-11-14 | End: 2022-11-16 | Stop reason: HOSPADM

## 2022-11-14 RX ORDER — ENOXAPARIN SODIUM 100 MG/ML
40 INJECTION SUBCUTANEOUS NIGHTLY
Status: DISCONTINUED | OUTPATIENT
Start: 2022-11-14 | End: 2022-11-16 | Stop reason: HOSPADM

## 2022-11-14 RX ORDER — HEPARIN SODIUM 5000 [USP'U]/ML
5000 INJECTION, SOLUTION INTRAVENOUS; SUBCUTANEOUS EVERY 8 HOURS SCHEDULED
Status: DISCONTINUED | OUTPATIENT
Start: 2022-11-14 | End: 2022-11-14

## 2022-11-14 RX ORDER — CLONAZEPAM 0.5 MG/1
0.25 TABLET ORAL 2 TIMES DAILY PRN
Status: DISCONTINUED | OUTPATIENT
Start: 2022-11-14 | End: 2022-11-16 | Stop reason: HOSPADM

## 2022-11-14 RX ORDER — OLANZAPINE 5 MG/1
5 TABLET, ORALLY DISINTEGRATING ORAL ONCE
Status: COMPLETED | OUTPATIENT
Start: 2022-11-15 | End: 2022-11-15

## 2022-11-14 RX ADMIN — DIVALPROEX SODIUM 250 MG: 250 TABLET, DELAYED RELEASE ORAL at 21:33

## 2022-11-14 RX ADMIN — AZTREONAM 2 G: 2 INJECTION, POWDER, LYOPHILIZED, FOR SOLUTION INTRAMUSCULAR; INTRAVENOUS at 21:46

## 2022-11-14 RX ADMIN — DOCUSATE SODIUM 200 MG: 100 CAPSULE ORAL at 08:51

## 2022-11-14 RX ADMIN — CARBIDOPA AND LEVODOPA 1 TABLET: 10; 100 TABLET ORAL at 08:51

## 2022-11-14 RX ADMIN — METRONIDAZOLE 500 MG: 500 INJECTION, SOLUTION INTRAVENOUS at 01:30

## 2022-11-14 RX ADMIN — METRONIDAZOLE 500 MG: 500 INJECTION, SOLUTION INTRAVENOUS at 09:18

## 2022-11-14 RX ADMIN — CARBIDOPA AND LEVODOPA 1 TABLET: 10; 100 TABLET ORAL at 21:34

## 2022-11-14 RX ADMIN — CETIRIZINE HYDROCHLORIDE 10 MG: 10 TABLET, FILM COATED ORAL at 08:51

## 2022-11-14 RX ADMIN — Medication 10 ML: at 08:52

## 2022-11-14 RX ADMIN — METRONIDAZOLE 500 MG: 500 INJECTION, SOLUTION INTRAVENOUS at 18:08

## 2022-11-14 RX ADMIN — ENOXAPARIN SODIUM 40 MG: 40 INJECTION SUBCUTANEOUS at 21:39

## 2022-11-14 RX ADMIN — Medication 10 ML: at 22:45

## 2022-11-14 RX ADMIN — SERTRALINE 50 MG: 50 TABLET, FILM COATED ORAL at 21:34

## 2022-11-14 RX ADMIN — HEPARIN SODIUM 5000 UNITS: 5000 INJECTION INTRAVENOUS; SUBCUTANEOUS at 13:54

## 2022-11-14 RX ADMIN — LORAZEPAM 0.25 MG: 2 INJECTION INTRAMUSCULAR; INTRAVENOUS at 10:49

## 2022-11-14 RX ADMIN — LEVOTHYROXINE SODIUM 50 MCG: 50 TABLET ORAL at 08:51

## 2022-11-14 RX ADMIN — TRAMADOL HYDROCHLORIDE 50 MG: 50 TABLET, COATED ORAL at 17:15

## 2022-11-14 RX ADMIN — ASPIRIN 81 MG: 81 TABLET, COATED ORAL at 08:51

## 2022-11-14 RX ADMIN — SENNOSIDES 1 TABLET: 8.6 TABLET, FILM COATED ORAL at 08:51

## 2022-11-14 RX ADMIN — AZTREONAM 2 G: 2 INJECTION, POWDER, LYOPHILIZED, FOR SOLUTION INTRAMUSCULAR; INTRAVENOUS at 13:53

## 2022-11-14 RX ADMIN — CLONAZEPAM 0.25 MG: 0.5 TABLET ORAL at 21:33

## 2022-11-14 RX ADMIN — CLOPIDOGREL 75 MG: 75 TABLET, FILM COATED ORAL at 08:51

## 2022-11-14 RX ADMIN — AZTREONAM 2 G: 2 INJECTION, POWDER, LYOPHILIZED, FOR SOLUTION INTRAMUSCULAR; INTRAVENOUS at 05:11

## 2022-11-14 RX ADMIN — QUETIAPINE FUMARATE 12.5 MG: 25 TABLET, FILM COATED ORAL at 21:33

## 2022-11-14 RX ADMIN — Medication 1 TABLET: at 08:51

## 2022-11-14 NOTE — PLAN OF CARE
Goal Outcome Evaluation:  Plan of Care Reviewed With: patient        Progress: no change  Outcome Evaluation: Received patient to room 344 bed a as a transfer from PCU under the services of Dr Mendez.  Assessment completed.  Daughter at bedside.  VSS.  Continue current plan of care.

## 2022-11-14 NOTE — CASE MANAGEMENT/SOCIAL WORK
Discharge Planning Assessment   Miguel Angel     Patient Name: Marilyn Sood  MRN: [id number]  Today's Date: 11/14/2022    Admit Date: 11/11/2022         Discharge Plan     Row Name 11/14/22 1515       Plan    Plan Pt was admitted on 11/11/22. SS received consults for hospice referral, 89 Y/O female SNF Pt, dementia, wrist fx, decline Daughter Eri interested in hospice at dc/ d/c planning Pt had complaints of r/t current nursing home. SS spoke with Pt's daughter, Eri [phone] who states she prefers Pt return to [facility], denied any complaints with NH. Daughter preferred for Pt to return to NH with possible hospice. Daughter did not have a preference of hospice agency. SS faxed referral to [facility] fax [phone]. SS spoke with [facility] per João [phone] making him aware that Pt's daughter requested hospice to call her and discuss what medications can be provided by hospice. Daughter to discuss with hospice and to speak with Pt's other family before making decision about returning to NH with hospice. SS to follow up with pt's daughter on 11/15/22. SS spoke with [facility] per Kelvin who states Pt has bed hold until further notice, facility is willing to accept Pt back with hospice. SS to follow.    17:09pm: SS received call from [facility] per Gabriel who states family have requested referral be faxed [facility]. SS confirmed with Pt's daughter, who is agreeable to SS sending referral to [facility]. SS faxed referral to fax [phone]. SS to follow.               IMMANUEL HannahW

## 2022-11-14 NOTE — CONSULTS
Palliative Care Initial Consult     Attending Physician: Gareth Mendez*  Referring Provider: Dr. Mendez      Code Status: No CPR  Code Status and Medical Interventions:   Ordered at: 11/12/22 0144     Medical Intervention Limits:    NO intubation (DNI)     Level Of Support Discussed With:    Health Care Surrogate     Code Status (Patient has no pulse and is not breathing):    No CPR (Do Not Attempt to Resuscitate)     Medical Interventions (Patient has pulse or is breathing):    Limited Support     Comments:    no central line; if vasopressors given, could only be given short term through peripheral line      Advanced Directives: Advance Directive Status: Patient has advance directive, copy in chart (copy in pts chartlet but not in epic)   Healthcare surrogate: Daughter, Eri      HPI: 89-year-old female with history of coronary artery disease, previous CABG and stenting, hypertension, hyperlipidemia, Parkinson's disease, dementia, previous tobacco use, and irritable bowel syndrome.  The patient lives in a skilled nursing facility for her care, and her daughter Eri is her healthcare surrogate and is at bedside.  The daughter states that the patient has been quite stable up until a week ago at which time she complained of right wrist and hand pain and was noted to have marked swelling.  She was found to have a fracture with ligament tear but decision made not to do surgery due to her overall condition and decline.  Since that time, the patient has become increasingly confused, agitated, and has not been eating or drinking much.  She was noted to have foul smelling urine and found to have a urinary tract infection.  Today, we reviewed the patient's condition with her daughter, reviewed palliative measures as well as hospice services.  The daughter was quite tearful as she  explained her mother's decline, and states that she does not want to see her mother suffer.  After lengthy discussion, she agreed  to a hospice consult for better pain management and closer follow-up when she is discharged.          ROS: Negative except as above in HPI.     Past Medical History:   Diagnosis Date   • Arthritis    • Ascending aortic aneurysm 10/6/2016   • Chronic hypokalemia 10/6/2016   • Colonic polyp 10/6/2016   • Dementia (HCC)    • Dyslipidemia 10/6/2016   • Gastroparesis 10/6/2016   • GERD with esophagitis 10/6/2016   • H/O blood clots    • Hypertension 10/6/2016   • IBS (irritable bowel syndrome) 10/6/2016   • IHD (ischemic heart disease) 10/6/2016   • Osteoarthritis 10/6/2016   • Osteoarthritis of left hip 10/6/2016   • Osteoporosis 10/6/2016   • Parkinson's disease (HCC) 10/6/2016   • Peripheral edema 10/6/2016   • Syncope 10/6/2016     Past Surgical History:   Procedure Laterality Date   • APPENDECTOMY      As a child   • CATARACT EXTRACTION WITH INTRAOCULAR LENS IMPLANT Bilateral     ,   • CORONARY ANGIOPLASTY WITH STENT PLACEMENT     • CORONARY ARTERY BYPASS GRAFT  2000    x3   • GASTRECTOMY PARTIAL / TOTAL      1970s   • LAPAROSCOPIC CHOLECYSTECTOMY     • SPLENECTOMY     • TONSILLECTOMY AND ADENOIDECTOMY      As a child   • TOTAL ABDOMINAL HYSTERECTOMY WITH SALPINGO OOPHORECTOMY  1973     Social History     Socioeconomic History   • Marital status:    Tobacco Use   • Smoking status: Former     Packs/day: 1.00     Types: Cigarettes     Quit date: 1992     Years since quittin.5   • Smokeless tobacco: Never   Vaping Use   • Vaping Use: Never used   Substance and Sexual Activity   • Alcohol use: No   • Drug use: No   • Sexual activity: Defer     Family History   Problem Relation Age of Onset   • Tuberculosis Mother    • No Known Problems Father        Allergies   Allergen Reactions   • Penicillins Anaphylaxis   • Reglan [Metoclopramide] Anaphylaxis   • Sulfa Antibiotics Anaphylaxis       Current Facility-Administered Medications   Medication Dose Route Frequency Provider Last Rate Last  Admin   • acetaminophen (TYLENOL) tablet 500 mg  500 mg Oral Q6H PRN Althea Ivy DO       • aspirin EC tablet 81 mg  81 mg Oral Daily Althea Ivy DO   81 mg at 11/14/22 0851   • aztreonam (AZACTAM) 2 g in sodium chloride 0.9 % 100 mL IVPB-VTB  2 g Intravenous Q8H Althea Ivy,    2 g at 11/14/22 1353   • bisacodyl (DULCOLAX) suppository 10 mg  10 mg Rectal Daily PRN Althea Ivy DO       • carbidopa-levodopa (SINEMET)  MG per tablet 1 tablet  1 tablet Oral BID Althea Ivy DO   1 tablet at 11/14/22 0851   • cetirizine (zyrTEC) tablet 10 mg  10 mg Oral Daily Althea Ivy,    10 mg at 11/14/22 0851   • clopidogrel (PLAVIX) tablet 75 mg  75 mg Oral Daily Althea Ivy DO   75 mg at 11/14/22 0851   • divalproex (DEPAKOTE) DR tablet 250 mg  250 mg Oral Nightly Althea Ivy DO   250 mg at 11/13/22 2157   • docusate sodium (COLACE) capsule 200 mg  200 mg Oral Daily Althea Ivy DO   200 mg at 11/14/22 0851   • heparin (porcine) 5000 UNIT/ML injection 5,000 Units  5,000 Units Subcutaneous Q8H Gareth Mendez,    5,000 Units at 11/14/22 1354   • levothyroxine (SYNTHROID, LEVOTHROID) tablet 50 mcg  50 mcg Oral Daily Althea Ivy DO   50 mcg at 11/14/22 0851   • LORazepam (ATIVAN) injection 0.25 mg  0.25 mg Intravenous Q12H PRN Althea Ivy DO   0.25 mg at 11/14/22 1049   • metroNIDAZOLE (FLAGYL) IVPB 500 mg  500 mg Intravenous Q8H Tyrnoe Betancourt MD   500 mg at 11/14/22 0918   • multivitamin (THERAGRAN) tablet 1 tablet  1 tablet Oral Daily Althea Ivy DO   1 tablet at 11/14/22 0851   • nitroglycerin (NITROSTAT) SL tablet 0.4 mg  0.4 mg Sublingual Q5 Min PRN Tyrone Betancourt MD       • pantoprazole (PROTONIX) EC tablet 40 mg  40 mg Oral Daily Althea Ivy DO   40 mg at 11/13/22 0801   • potassium chloride ER (K-TAB) ER tablet 40 mEq  40 mEq Oral PRN Althea Ivy DO        Or   • potassium chloride (KLOR-CON) packet 40 mEq  40 mEq Oral PRN Biju  "DO Althea        Or   • potassium chloride 10 mEq in 100 mL IVPB  10 mEq Intravenous Q1H PRN Althea Ivy DO       • senna (SENOKOT) tablet 1 tablet  1 tablet Oral Daily Althea Ivy DO   1 tablet at 11/14/22 0851   • senna (SENOKOT) tablet 1 tablet  1 tablet Oral Daily PRN Althea Ivy DO       • sertraline (ZOLOFT) tablet 50 mg  50 mg Oral Nightly Althea Ivy DO   50 mg at 11/13/22 2157   • sodium chloride 0.9 % flush 10 mL  10 mL Intravenous PRN Tyrone Betancourt MD       • sodium chloride 0.9 % flush 10 mL  10 mL Intravenous Q12H Tyrone Betancourt MD   10 mL at 11/14/22 0852   • sodium chloride 0.9 % flush 10 mL  10 mL Intravenous PRN Tyrone Betancourt MD       • traMADol (ULTRAM) tablet 50 mg  50 mg Oral Q12H PRN Althea Ivy DO   50 mg at 11/12/22 2018        •  acetaminophen  •  bisacodyl  •  LORazepam  •  nitroglycerin  •  potassium chloride ER **OR** potassium chloride **OR** potassium chloride  •  senna  •  [COMPLETED] Insert peripheral IV **AND** sodium chloride  •  sodium chloride  •  traMADol    Current medication reviewed for route, type, dose and frequency and are current per MAR.    Palliative Performance Scale Score:     /84   Pulse 57   Temp 98.3 °F (36.8 °C) (Oral)   Resp 18   Ht 154.9 cm (61\")   Wt 54.4 kg (119 lb 14.9 oz)   SpO2 96%   BMI 22.66 kg/m²     Intake/Output Summary (Last 24 hours) at 11/14/2022 1404  Last data filed at 11/14/2022 1000  Gross per 24 hour   Intake 1700 ml   Output 2550 ml   Net -850 ml       PE:  General Appearance:     ill appearing, yelling in bed   HEENT:    NC/AT   Neck:   Supple, dry mucous membranes   Lungs:     CTAB without w/r/r    Heart:    RRR, normal S1 and S2, no M/R/G   Abdomen:     Soft, nondistended   Extremities:  Swelling and tenderness to the right wrist and hand, generalized pain with any movement   Pulses:   Pulses palpable and equal bilaterally   Skin:   Warm, dry   Neurologic:  Agitated, yelling \"leave " "me alone!\"   Psych:  Agitated       Labs:   Results from last 7 days   Lab Units 11/13/22  0805   WBC 10*3/mm3 9.94   HEMOGLOBIN g/dL 10.7*   HEMATOCRIT % 35.6   PLATELETS 10*3/mm3 248     Results from last 7 days   Lab Units 11/13/22  0805   SODIUM mmol/L 145   POTASSIUM mmol/L 4.8   CHLORIDE mmol/L 118*   CO2 mmol/L 18.2*   BUN mg/dL 16   CREATININE mg/dL 0.59   GLUCOSE mg/dL 88   CALCIUM mg/dL 8.1*     Results from last 7 days   Lab Units 11/13/22  0805 11/12/22 2053 11/12/22  0347   SODIUM mmol/L 145  --  142   POTASSIUM mmol/L 4.8   < > 3.4*   CHLORIDE mmol/L 118*  --  111*   CO2 mmol/L 18.2*  --  19.0*   BUN mg/dL 16  --  37*   CREATININE mg/dL 0.59  --  0.99   CALCIUM mg/dL 8.1*  --  8.0*   BILIRUBIN mg/dL  --   --  0.2   ALK PHOS U/L  --   --  67   ALT (SGPT) U/L  --   --  10   AST (SGOT) U/L  --   --  22   GLUCOSE mg/dL 88  --  156*    < > = values in this interval not displayed.     Imaging Results (Last 72 Hours)     Procedure Component Value Units Date/Time    XR Wrist 3+ View Left [[id number]] Collected: 11/13/22 1805     Updated: 11/13/22 1807    Narrative:      CR Wrist Min 3 Vws LT    INDICATION:   Left wrist bruising    COMPARISON:   None available.    FINDINGS:   AP, lateral, and oblique views of the left wrist.      Degenerative changes of the radiocarpal, carpal, and carpal metacarpal joints. No acute fracture or dislocation. No radiopaque foreign body.      Impression:      Degenerative changes of the wrist and carpal joints. No acute fracture or dislocation.    Signer Name: Pedro Jerome MD   Signed: 11/13/2022 6:05 PM   Workstation Name: [id number]    [facility]    CT Upper Extremity Right Without Contrast [[id number]] Collected: 11/11/22 1944     Updated: 11/11/22 1946    Narrative:      CT UE RT WO    INDICATION:    Right wrist pain and swelling.    TECHNIQUE:   CT of the right upper extremity. without IV contrast. Coronal and sagittal reconstructions were obtained.  " Radiation dose reduction techniques included automated exposure control or exposure modulation based on body size. Count of known CT and cardiac  nuc med studies performed in previous 12 months: 0.     COMPARISON:    Right wrist x-rays 11/11/2022 and 11/8/2022    FINDINGS:  Examination is markedly limited secondary to patient positioning and motion artifact. Evaluation of the right wrist is essentially nondiagnostic secondary to motion on the exam. Remainder of the visualized right upper extremity appears grossly  unremarkable without evidence of a displaced fracture or dislocation.      Impression:      Essentially nondiagnostic exam secondary to patient positioning and motion artifact. The right wrist cannot be evaluated on the exam. No other gross acute findings.    Signer Name: Sheldon Jose MD   Signed: 11/11/2022 7:44 PM   Workstation Name: [id number]    [facility]    CT Chest Without Contrast Diagnostic [[id number]] Collected: 11/11/22 1940     Updated: 11/11/22 1942    Narrative:      CT Chest WO, CT Abdomen Pelvis WO    INDICATION:   Sepsis. Altered mental status. Dementia.    TECHNIQUE:   CT of the chest, abdomen and pelvis without IV contrast. Coronal and sagittal reconstructions were obtained.  Radiation dose reduction techniques included automated exposure control or exposure modulation based on body size. Count of known CT and cardiac  nuc med studies performed in previous 12 months: 0.     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 Extensive atherosclerotic calcification throughout the thoracic aorta. Cardiomegaly. No pericardial effusions. Prior CABG. Mild left basilar atelectasis/infiltrate. No pleural effusions or pneumothorax.    Abdomen:   The liver is within normal limits. Spleen is not well visualized. Pancreas is grossly unremarkable. Suspected cholecystectomy. Both adrenal glands are normal. Stable  right renal cyst. Kidneys are otherwise grossly unremarkable allowing for lack of IV  contrast. Abdominal aorta normal in course and caliber. Small bowel is unremarkable without obstruction. Normal appendix. The colon is unremarkable. No free fluid or free air.    Pelvis:   The urinary bladder is unremarkable.  Pelvic viscera is grossly unremarkable. No free pelvic fluid.    No acute osseous abnormality.        Impression:        1. Mild left basilar atelectasis/infiltrate.  2. 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  3. Cardiomegaly.  4. No gross acute abdominal or pelvic findings.          Signer Name: Sheldon Jose MD   Signed: 11/11/2022 7:40 PM   Workstation Name: [id number]    [facility]    CT Abdomen Pelvis Without Contrast [[id number]] Collected: 11/11/22 1940     Updated: 11/11/22 1942    Narrative:      CT Chest WO, CT Abdomen Pelvis WO    INDICATION:   Sepsis. Altered mental status. Dementia.    TECHNIQUE:   CT of the chest, abdomen and pelvis without IV contrast. Coronal and sagittal reconstructions were obtained.  Radiation dose reduction techniques included automated exposure control or exposure modulation based on body size. Count of known CT and cardiac  nuc med studies performed in previous 12 months: 0.     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 Extensive atherosclerotic calcification throughout the thoracic aorta. Cardiomegaly. No pericardial effusions. Prior CABG. Mild left basilar atelectasis/infiltrate. No pleural effusions or pneumothorax.    Abdomen:   The liver is within normal limits. Spleen is not well visualized. Pancreas is grossly unremarkable. Suspected cholecystectomy. Both adrenal glands are normal. Stable right renal cyst. Kidneys are otherwise grossly unremarkable allowing for lack of IV  contrast. Abdominal aorta normal in course and caliber. Small bowel is unremarkable without  obstruction. Normal appendix. The colon is unremarkable. No free fluid or free air.    Pelvis:   The urinary bladder is unremarkable.  Pelvic viscera is grossly unremarkable. No free pelvic fluid.    No acute osseous abnormality.        Impression:        1. Mild left basilar atelectasis/infiltrate.  2. 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  3. Cardiomegaly.  4. No gross acute abdominal or pelvic findings.          Signer Name: Sheldon Jose MD   Signed: 11/11/2022 7:40 PM   Workstation Name: [id number]    [facility]    CT Head Without Contrast [[id number]] Collected: 11/11/22 1936     Updated: 11/11/22 1938    Narrative:      CT Head WO    HISTORY: AMS    COMPARISON: CT brain 6/27/2018.    TECHNIQUE: CT of the brain without IV contrast. Coronal and sagittal reconstructions were obtained.  Radiation dose reduction techniques included automated exposure control or exposure modulation based on body size. Count of known CT and cardiac nuc med  studies performed in previous 12 months: 0.     Time of Scan: 6:53 PM    FINDINGS:    No acute intracranial hemorrhage, mass effect, midline shift, or hydrocephalus.     Gray-white matter differentiation is within normal limits. Patchy hypodensities in the cerebral white matter, nonspecific but likely representing chronic microvascular ischemic changes.     Ventricles and cortical sulci are prominent, in keeping with cerebral volume loss. Basal cisterns are clear.     Calvarium is intact. Biparietal calvarial thinning.    Visualized paranasal sinuses are clear. Visualized mastoid air cells are clear.      Impression:        1.  No acute intracranial abnormality.  2.  Chronic microvascular ischemic disease and cerebral atrophy, progressed compared to prior study 6/27/2018.    Signer Name: Pedro Jerome MD   Signed: 11/11/2022 7:36 PM   Workstation Name: [id number]    [facility]    XR Chest 1 View [[id number]] Collected: 11/11/22  1619     Updated: 11/11/22 1622    Narrative:      EXAM:    XR Chest, 1 View     EXAM DATE:    11/11/2022 3:46 PM     CLINICAL HISTORY:    AMS     TECHNIQUE:    Frontal view of the chest.     COMPARISON:    03/16/2019     FINDINGS:    Lungs:  Pulmonary vascular congestion.  Interstitial thickening.  Left  basilar airspace disease.    Pleural space:  Unremarkable.  No pneumothorax.    Heart:  Cardiomegaly.  CABG.    Mediastinum:  Unremarkable.    Bones/joints:  Stable bony structures.       Impression:      1.  CHF/edema.  2.  Mild left basilar airspace disease.     This report was finalized on 11/11/2022 4:20 PM by Dr. Edwin Mike MD.             Diagnostics: Reviewed    A: 89-year-old female with multiple medical conditions who has had a significant decline, has evidence of trauma to her right wrist and hand, and has had a significant decline.  She is confused, agitated, and appears to have pain with any movement.  Lengthy discussion with the daughter who agrees to a hospice consult.  The plan is to send her back to the skilled nursing facility when discharged.       P: Continue present management.  A hospice referral has been placed, we will continue to monitor symptoms and offer symptom management as well as support to the daughter.    We appreciate the consult and the opportunity to participate in Marilyn Sood's care. We will continue to follow along. Please do not hesitate to contact us regarding further symptom management or goals of care needs, including after hours or on weekends via our on call provider at [phone].     Time: 50 minutes spent reviewing medical and medication records, assessing and examining patient, discussing with family, answering questions, providing some guidance about a plan and documentation of care, and coordinating care with other healthcare members, with > 50% time spent face to face.     Kristian Henley MD    11/14/2022

## 2022-11-14 NOTE — PROGRESS NOTES
[facility] HOSPITALIST PROGRESS NOTE     Patient Identification:  Name:  Marilyn Sood  Age:  89 y.o.  Sex:  female  :  1930  MRN:  [id number]  Visit Number:  [id number]  ROOM: [address]     Primary Care Provider:  Jarod Burnett MD    Length of stay in inpatient status:  3    Subjective     Chief Compliant:    Chief Complaint   Patient presents with   • Altered Mental Status       History of Presenting Illness:   Patient seen in follow-up for encephalopathy, pneumonia.  Patient is awake, alert to self but not place or time.  She is able to recognize her daughter who is present at the bedside.  Yesterday evening patient became agitated, removing telemetry leads, etc. requiring low-dose IV Ativan.  She rested well overnight after receiving the Ativan without further issues.  No other adverse events noted.    Objective     Current Hospital Meds:aspirin, 81 mg, Oral, Daily  aztreonam, 2 g, Intravenous, Q8H  carbidopa-levodopa, 1 tablet, Oral, BID  cetirizine, 10 mg, Oral, Daily  clopidogrel, 75 mg, Oral, Daily  divalproex, 250 mg, Oral, Nightly  docusate sodium, 200 mg, Oral, Daily  heparin (porcine), 5,000 Units, Subcutaneous, Q8H  levothyroxine, 50 mcg, Oral, Daily  metroNIDAZOLE, 500 mg, Intravenous, Q8H  multivitamin, 1 tablet, Oral, Daily  pantoprazole, 40 mg, Oral, Daily  senna, 1 tablet, Oral, Daily  sertraline, 50 mg, Oral, Nightly  sodium chloride, 10 mL, Intravenous, Q12H         Current Antimicrobial Therapy:  Anti-Infectives (From admission, onward)    Ordered     Dose/Rate Route Frequency Start Stop    22 1259  aztreonam (AZACTAM) 2 g in sodium chloride 0.9 % 100 mL IVPB-VTB        Ordering Provider: Althea Ivy DO    2 g  over 4 Hours Intravenous Every 8 Hours 22 1422 22 0629    22 2323  metroNIDAZOLE (FLAGYL) IVPB 500 mg        Ordering Provider: Tyrone Betancourt MD    500 mg  over 30 Minutes Intravenous Every 8 Hours 22 0100  11/17/22 0059    11/11/22 1731  aztreonam (AZACTAM) 2 g in sodium chloride 0.9 % 100 mL IVPB-VTB        Ordering Provider: Ivy Villalpando PA    2 g  200 mL/hr over 30 Minutes Intravenous Once 11/11/22 1733 11/11/22 1827 11/11/22 1731  vancomycin (VANCOCIN) 1,000 mg in sodium chloride 0.9 % 250 mL IVPB        Ordering Provider: Ivy Villalpando PA    20 mg/kg × 49.9 kg Intravenous Once 11/11/22 1733 11/11/22 2132        Current Diuretic Therapy:  Diuretics (From admission, onward)    None        ----------------------------------------------------------------------------------------------------------------------  Vital Signs:  Temp:  [97.1 °F (36.2 °C)-98.3 °F (36.8 °C)] 98.3 °F (36.8 °C)  Heart Rate:  [51-60] 57  Resp:  [18] 18  BP: (108-159)/(60-95) 146/84  SpO2:  [95 %-99 %] 96 %  on   ;   Device (Oxygen Therapy): room air  Body mass index is 22.66 kg/m².    Wt Readings from Last 3 Encounters:   11/14/22 54.4 kg (119 lb 14.9 oz)   11/11/22 48.1 kg (106 lb)   11/08/22 48.1 kg (106 lb)     Intake & Output (last 3 days)       11/11 0701 11/12 0700 11/12 0701 11/13 0700 11/13 0701 11/14 0700 11/14 0701  11/15 0700    P.O. 0 120 0 0    I.V. (mL/kg) 1979.9 (38.9) 3000 (55.1) 1400 (25.7)     IV Piggyback 1847 550 300     Total Intake(mL/kg) 3826.9 (75.2) 3670 (67.5) 1700 (31.3) 0 (0)    Urine (mL/kg/hr)  600 (0.5) 1850 (1.4) 1200 (2.8)    Total Output  600 1850 1200    Net +3826.9 +3070 -150 -1200            Urine Unmeasured Occurrence 1 x 1 x 2 x         Diet Pureed; Thin  ----------------------------------------------------------------------------------------------------------------------  Physical exam:  Constitutional: Elderly, demented female, resting comfortably in bed, no acute distress.      HENT:  Head:  Normocephalic and atraumatic.  Mouth:  Moist mucous membranes.    Eyes:  Conjunctivae and EOM are normal. No scleral icterus.   Cardiovascular:  Normal rate, regular rhythm and normal heart  sounds with no murmur. No JVD.   Pulmonary/Chest:  No respiratory distress, no wheezes, no crackles, with normal breath sounds and good air movement. Unlabored. No accessory muscle use.  Abdominal:  Soft. No distension and no tenderness.  Bowel sounds present. No rebound or guarding.   Musculoskeletal:  No tenderness, and no deformity.  No red or swollen joints anywhere.    Neurological:  Alert and oriented to person only.  No cranial nerve deficit.   Nonfocal.   Skin:  Skin is warm and dry. No rash noted. No pallor.   Peripheral vascular:  No clubbing, no cyanosis, no edema. Pedal and tibial pulses 2 out of 4 bilaterally.     ----------------------------------------------------------------------------------------------------------------------  Results from last 7 days   Lab Units 11/13/22  0805 11/12/22 0347 11/11/22 1938 11/11/22 1646   CRP mg/dL  --  2.85*  --  3.92*   LACTATE mmol/L  --   --  1.1 3.2*   WBC 10*3/mm3 9.94 13.65*  --  7.76   HEMOGLOBIN g/dL 10.7* 10.4*  --  12.0   HEMATOCRIT % 35.6 32.7*  --  37.6   MCV fL 99.7* 93.2  --  92.2   MCHC g/dL 30.1* 31.8  --  31.9   PLATELETS 10*3/mm3 248 248  --  309         Results from last 7 days   Lab Units 11/13/22  0805 11/12/22 2053 11/12/22 0347 11/11/22 1646 11/08/22 2111   SODIUM mmol/L 145  --  142 140 136   POTASSIUM mmol/L 4.8 4.5 3.4* 3.7 4.4   CHLORIDE mmol/L 118*  --  111* 105 101   CO2 mmol/L 18.2*  --  19.0* 18.6* 22.6   BUN mg/dL 16  --  37* 46* 25*   CREATININE mg/dL 0.59  --  0.99 1.28* 1.05*   CALCIUM mg/dL 8.1*  --  8.0* 9.3 8.9   GLUCOSE mg/dL 88  --  156* 143* 111*   ALBUMIN g/dL  --   --  2.52* 3.29* 3.26*   BILIRUBIN mg/dL  --   --  0.2 0.2 0.4   ALK PHOS U/L  --   --  67 88 73   AST (SGOT) U/L  --   --  22 24 16   ALT (SGPT) U/L  --   --  10 10 <5   Estimated Creatinine Clearance: 52.2 mL/min (by C-G formula based on SCr of 0.59 mg/dL).  No results found for: AMMONIA  Results from last 7 days   Lab Units 11/11/22  1938 11/11/22  6055  11/08/22  2111   CK TOTAL U/L  --   --  48   TROPONIN T ng/mL <0.010 <0.010  --      Results from last 7 days   Lab Units 11/11/22  1646   PROBNP pg/mL 1,369.0         No results found for: HGBA1C, POCGLU  Lab Results   Component Value Date    TSH 4.850 (H) 11/11/2022    FREET4 0.72 (L) 11/11/2022     No results found for: PREGTESTUR, PREGSERUM, HCG, HCGQUANT  Pain Management Panel     Pain Management Panel Latest Ref Rng & Units 8/5/2015    AMPHETAMINES SCREEN, URINE NEGATIVE Negative    BARBITURATES SCREEN NEGATIVE Negative    BENZODIAZEPINE SCREEN, URINE NEGATIVE Negative    COCAINE SCREEN, URINE NEGATIVE Negative    METHADONE SCREEN, URINE NEGATIVE Negative        Brief Urine Lab Results  (Last result in the past 365 days)      Color   Clarity   Blood   Leuk Est   Nitrite   Protein   CREAT   Urine HCG        11/11/22 1951 Dark Yellow   Clear   Trace   Small (1+)   Negative   Negative               Blood Culture   Date Value Ref Range Status   11/11/2022 No growth at 2 days  Preliminary   11/11/2022 No growth at 2 days  Preliminary     Urine Culture   Date Value Ref Range Status   11/11/2022 <10,000 CFU/mL Normal Urogenital Amira  Final     No results found for: WOUNDCX  No results found for: STOOLCX  No results found for: RESPCX  No results found for: AFBCX  Results from last 7 days   Lab Units 11/12/22  0347 11/11/22  1938 11/11/22  1646   PROCALCITONIN ng/mL  --   --  0.09   LACTATE mmol/L  --  1.1 3.2*   CRP mg/dL 2.85*  --  3.92*       I have personally looked at the labs and they are summarized above.  ----------------------------------------------------------------------------------------------------------------------  Detailed radiology reports for the last 24 hours:  Imaging Results (Last 24 Hours)     Procedure Component Value Units Date/Time    XR Wrist 3+ View Left [[id number]] Collected: 11/13/22 1805     Updated: 11/13/22 1807    Narrative:      CR Wrist Min 3 Vws LT    INDICATION:   Left wrist  bruising    COMPARISON:   None available.    FINDINGS:   AP, lateral, and oblique views of the left wrist.      Degenerative changes of the radiocarpal, carpal, and carpal metacarpal joints. No acute fracture or dislocation. No radiopaque foreign body.      Impression:      Degenerative changes of the wrist and carpal joints. No acute fracture or dislocation.    Signer Name: Pedro Jerome MD   Signed: 11/13/2022 6:05 PM   Workstation Name: [id number]    [facility]        Assessment & Plan      Patient is a 89-year-old female with history significant for advanced dementia, CAD status post CABG, Parkinson's disease who presented to the ER with altered mental status and was diagnosed with left lower lobe pneumonia.    #Sepsis due to left lower lobe pneumonia  #Metabolic encephalopathy  --Patient presented encephalopathic and was diagnosed with left lower lobe pneumonia.,  Hypotensive  --Initial admit labs noted leukocytosis with left shift, BUN/creatinine 46/1.3, Pro-Kris negative, CRP 3.9  --COVID-19/flu negative  --Blood cultures NGTD, MRSA negative, swab negative, sputum culture unable to be produced  --CT chest with mild left basilar infiltrates  --No evidence for aspiration per speech  --Patient has medically improved, leukocytosis has resolved and remains hemodynamically stable on room air.  --Continue IV Azactam (penicillin allergy), Flagyl    #CAD status post three-vessel CABG  --Asymptomatic, troponins negative x2, EKG without ischemic changes  --Continue home Plavix, aspirin    #Parkinson's disease  #Advanced dementia  --Initial CT head negative on admission  --Continue home Zoloft, Depakote and carbidopa levodopa  --will add low-dose Seroquel at bedtime    #Right wrist injury, continue brace, follow-up outpatient with Ortho  #Ascending aortic aneurysm, stable, 4 cm, follow-up outpatient  #Essential hypertension, normotensive, hold home meds  #Hyperlipidemia, continue  statin  #Hypothyroidism, continue home Synthroid  #Osteoarthritis, continue as needed meds  #IBS-constipation, continue bowel regimen    CHECKLIST:  Abx: Azactam, Flagyl  Steroids: None  VTE: Lovenox  GI ppx: PPI  Diet: Consistent carb  Code: No CPR, limited  Dispo: Patient is medically improved, goals of care discussion today with palliative care.  Okay to stepdown to telemetry.  Dispo pending goals of care conversation,  Potentially back to SNF in 48hrs.     Gareth Mendez DO  [facility]  11/14/22  14:52 EST

## 2022-11-14 NOTE — THERAPY RE-EVALUATION
Acute Care - Speech Language Pathology   Swallow Re-Assessment  [place]     Patient Name: Marilyn Sood  : 1930  MRN: [id number]  Today's Date: 2022             Admit Date: 2022    Visit Dx:     ICD-10-CM ICD-9-CM   1. Sepsis, due to unspecified organism, unspecified whether acute organ dysfunction present ([facility])  A41.9 038.9     995.91   2. Pneumonia of left lower lobe due to infectious organism  J18.9 486   3. Acute UTI  N39.0 599.0     Patient Active Problem List   Diagnosis   • IHD (ischemic heart disease)   • Hypertension   • Dyslipidemia   • Osteoarthritis   • Osteoporosis   • GERD with esophagitis   • Gastroparesis   • IBS (irritable bowel syndrome)   • Parkinson's disease (HCC)   • Colonic polyp   • Peripheral edema   • Dementia (HCC)   • Chronic hypokalemia   • Osteoarthritis of left hip   • Syncope   • Ascending aortic aneurysm   • Ischemic heart disease   • Mixed hyperlipidemia   • Hypertensive urgency   • Acute deep vein thrombosis (DVT) of distal vein of left lower extremity ([facility])   • Lower extremity edema   • Severe sepsis ([facility])     Past Medical History:   Diagnosis Date   • Arthritis    • Ascending aortic aneurysm 10/6/2016   • Chronic hypokalemia 10/6/2016   • Colonic polyp 10/6/2016   • Dementia ([facility])    • Dyslipidemia 10/6/2016   • Gastroparesis 10/6/2016   • GERD with esophagitis 10/6/2016   • H/O blood clots    • Hypertension 10/6/2016   • IBS (irritable bowel syndrome) 10/6/2016   • IHD (ischemic heart disease) 10/6/2016   • Osteoarthritis 10/6/2016   • Osteoarthritis of left hip 10/6/2016   • Osteoporosis 10/6/2016   • Parkinson's disease (HCC) 10/6/2016   • Peripheral edema 10/6/2016   • Syncope 10/6/2016     Past Surgical History:   Procedure Laterality Date   • APPENDECTOMY      As a child   • CATARACT EXTRACTION WITH INTRAOCULAR LENS IMPLANT Bilateral     ,   • CORONARY ANGIOPLASTY WITH STENT PLACEMENT     • CORONARY ARTERY BYPASS GRAFT  2000    x3   •  GASTRECTOMY PARTIAL / TOTAL      1970s   • LAPAROSCOPIC CHOLECYSTECTOMY  2003   • SPLENECTOMY  2007   • TONSILLECTOMY AND ADENOIDECTOMY      As a child   • TOTAL ABDOMINAL HYSTERECTOMY WITH SALPINGO OOPHORECTOMY  1973     Ms. Sood is seen at bedside this am on PCU for diet safety/assessment. She is s/p initial Clinical Dysphagia Assessment 11/12/22 post admission w/ recommendation for least restrictive modified po diet of puree consistency, thin liquids. Family did report at that time pt's premorbid baseline modified po diet of mechanical soft consistencies/purees.     Ms. Sood is resting this am, awakens to verbal/tactile stimuli and assistance to reposition upright and centered in bed. She is a/a, continues confused. She is overall non-interactive and w/ limited verbalizations today. She does imitate basic oral postures w/ a model. Oral mucosa are moist, pink and clean. Secretions are controlled.     She is positioned upright and centered in bed to accept multiple po presentations of ice chips, puree consistency, thin liquids via tsp, cup and straw. Further solids deferred 2/2 oral dysphagia, ams and fatigue effects. She does not self feed during this assessment.     Upon po presentations adequate bolus anticipation and acceptance. Bolus formation, manipulation and control are moderately weak in general. Marked oral holding w/ ice chip trials, w/ limited rolling-like manipulation. A-p transit is mildly-moderately delayed. No overt s/s aspiration evidenced before the swallow.     Pharyngeal swallow is slightly delayed w/ adequate hyolaryngeal elevation per palpation. No overt s/s aspiration evidenced. No silent aspiration suspected.    Impression: Per this reassessment, Ms. Sood is accepting her current modified po diet w/o overt s/s aspiration. Please continue 1:1 feeding assistance. SLP to sign off at this time. This is felt to be least restrictive for her 2/2 baseline ams, fatigue effects per current  medical status. SLP to sign at this time.     SLP Recommendation and Plan  1. Puree consistency, thin liquids.    2. Meds whole in puree or crushed prn.   3. Upright and centered, fully a/a for all po intake.  4. DENIS precautions.  5. Oral care protocol.  6. 1:1 assist w/ feeding.   SLP to sign off at this time.      D/w pt results and recommendations, question retention 2/2 ams.      D/w RN results and recommendations w/ verbal agreement.     Thank you for allowing me to participate in the care of your patient-  Katarzyna Hernandez M.A., CCC-SLP      EDUCATION  The patient has been educated in the following areas:   Dysphagia (Swallowing Impairment) Modified Diet Instruction.     Time Calculation:       Therapy Charges for Today     Code Description Service Date Service Provider Modifiers Qty    [id number]  ST EVAL ORAL PHARYNG SWALLOW 4 11/14/2022 Katarzyna Hernandez MA,CCC-SLP GN 1               Katarzyna Hernandez MA,CCC-SLP  11/14/2022

## 2022-11-14 NOTE — DISCHARGE PLACEMENT REQUEST
"Marilyn Sood (89 y.o. Female)     Date of Birth   04/14/1930    Social Security Number       Address   [address] [place] [address]    Home Phone   [phone]    MRN   [id number]       Evangelical   None    Marital Status                               Admission Date   11/11/22    Admission Type   Emergency    Admitting Provider   Tyrone Betancourt MD    Attending Provider   Gareth Mendez DO    Department, Room/Bed   [facility] PROGRESS CARE, P211/1P       Discharge Date       Discharge Disposition       Discharge Destination                               Attending Provider: Gareth Mendez DO    Allergies: Penicillins, Reglan [Metoclopramide], Sulfa Antibiotics    Isolation: None   Infection: None   Code Status: No CPR    Ht: 154.9 cm (61\")   Wt: 54.4 kg (119 lb 14.9 oz)    Admission Cmt: None   Principal Problem: Severe sepsis (HCC) [A41.9,R65.20]                 Active Insurance as of 11/11/2022     Primary Coverage     Payor Plan Insurance Group Employer/Plan Group    [facility] MEDICARE REPLACEMENT [facility] MEDICARE REPLACEMENT [id number]     Payor Plan Address Payor Plan Phone Number Payor Plan Fax Number Effective Dates    [address] [address]   8/1/2021 - None Entered    [facility] [id number]       Subscriber Name Subscriber Birth Date Member ID       MARILYN SOOD 4/14/1930 [id number]           Secondary Coverage     Payor Plan Insurance Group Employer/Plan Group    [place]      Payor Plan Address Payor Plan Phone Number Payor Plan Fax Number Effective Dates    [address] [phone]  11/11/2022 - None Entered    [facility] [id number]       Subscriber Name Subscriber Birth Date Member ID       MARILYN SOOD 4/14/1930 [id number]                 Emergency Contacts      (Rel.) Home Phone Work Phone Mobile Phone    Eri Gray (Daughter) [phone] -- --    ALMITA LAMB (Grandchild) " [phone] -- --    LAKESHARODNEY (Grandchild) [phone] -- --        [facility] PROGRESS CARE  [address] [facility]  [place] [address]  Phone:  [phone]  Fax:  [phone]        Patient: ROOM: [address]   Marilyn Sood MRN:  [id number]   [address] [facility] [address] :  1930  SSN:    Phone: [phone] Sex:  F   PCP: Jarod Burnett                 Emergency Contact Information      Name Relation Home Work Mobile     Eri Gray Daughter [phone]         ALMITA LAMB Grandchild [phone]         RODNEY CROWDER Grandchild [phone]              INSURANCE PAYOR PLAN GROUP # SUBSCRIBER ID   Primary:  Secondary:    [employer] MEDICARE REPLACEMENT [place] [id number]  [id number]    [id number]  [id number]   Admitting Diagnosis: Severe sepsis (HCC) [A41.9, R65.20]  Order Date:  2022        Case Management  Consult       (Order ID: [id number])     Diagnosis:         Priority:  Routine Expected Date:   Expiration Date:        Interval:   Count:    Reason for Consult? hospice referral. 89 y o female SNF pt, dementia, wrist fx, decline. Daughter, Eri, interested in hospice at [place].        Authorizing Provider:Kristian Henley MD  Authorizing Provider's NPI: [id number]  Order Entered By: Kristian Henley MD 2022 11:41 AM     Electronically signed by: Kristian Henley MD 2022 11:41 AM            History & Physical      Janet Parker PA-C at 22 [phone]     Attestation signed by Tyrone Betancourt MD at 22 [phone]    I have seen and examined this patient independently from Janet Parker PA-C, and have reviewed this documentation and agree. BP already improving with continuation of maintenance IV fluids. Patient resting in bed, opening eyes to voice and speaking some but not answering questions appropriately at this time. Daughter at bedside. Discussed further regarding whether she would  want a central line for vasopressor support if vasopressors became necessary, and when she learned that the central line would have to go in either her groin, neck or upper chest wall, she felt that was too invasive and declined such intervention. Will add this detail to patient's code status order. Daughter is strongly considering palliative care/hospice moving forward. Palliative care team consulted to discuss goals of care further with her. In the mean time, continue conservative management with IV antibiotics and IV fluids.                       [facility] Medicine Services  History & Physical    Patient Identification:  Name:  Marilyn Sood  Age:  89 y.o.  Sex:  female  :  1930  MRN:  [id number]   Visit Number:  [id number]  Primary Care Physician:  Jarod Burnett MD    Subjective     2022   Chief complaint:   Chief Complaint   Patient presents with   • Altered Mental Status     History of presenting illness:      Marilyn Sood is a 89 y.o. female with past medical history significant for ascending aortic aneurysm, coronary artery disease status post three-vessel CABG and stenting, hyperlipidemia, essential hypertension, irritable bowel syndrome, osteoarthritis, Parkinson's disease, dementia, former tobacco abuse who presents to [facility] emergency department with complaints of altered mental status. Patient has underlying dementia and unable to provide a history. Patient's daughter present at bedside who provides majority of history. Patient's daughter reports that she had a recent injury to her right wrist and seen orthopedic surgery in the outpatient setting today who states patient has a suspected fracture and ligament tear. The mechanism of the injury is unknown. Orthopedic surgery is deciding to avoid surgery if possible and recommending to wear a brace.Patient's daughter states that since the injury patient has been unable to get out of bed.  Daughter was contacted by the nursing home this evening a couple of hours after patient got back from her orthopedic surgery appointment reporting that the patient was limp and pale. The daughter states that the patient had a low-grade fever (99.8) a couple of days ago. Patient was swabbed for COVID and was negative. The daughter states that the patient did have very foul-smelling urine. She states she has not had any difficulty swallowing or choking on her medications or food, but notes that patient has had decreased oral intake since her injury. She has had no nausea or vomiting. Denies any cough or obvious increased work of breathing.     The patient has a MOST form documenting that she is DNR/DNI from the nursing home. Daughter is requesting more time to discuss with other family members to decide if she wants to proceed with vasopressors and central line if patient was to become hypotensive again.     Upon arrival to the ED, vital signs were temperature 97.7, heart rate 74, respirations 18, blood pressure 87/64, SPO2 95% on room air.  CMP with glucose 143, CO2 18.6, anion gap 16.4, creatinine 1.28, BUN 46, BUN/creatinine ratio 35.9, EGFR 39.4, albumin 3.29, otherwise unremarkable.  CBC with RDW 16, RDW-SD 54.5, otherwise unremarkable.  CRP 3.92.  Lactate 3.2.  Procalcitonin 0.09.  Troponin T negative x1.  proBNP 1369.  Pending peripheral blood cultures x2.  COVID-19 and influenza A/B swab negative.  Urinalysis with trace ketones, trace blood, 1+ leukocytes, 35 RBC, 6-12 WBC, 1+ bacteria.  Pending urine culture.  Chest x-ray with CHF/edema, mild left basilar airspace disease.  CT chest and abdomen/pelvis with mild left basilar atelectasis/infiltrate, 4 cm ectasia of ascending thoracic aorta, cardiomegaly.  CT head without acute intracranial abnormality, chronic microvascular ischemic disease and cerebral atrophy.  CT right upper extremity essentially nondiagnostic secondary to patient positioning and motion  artifact, right wrist cannot be evaluated on exam otherwise no acute findings.    Known Emergency Department medications received prior to my evaluation included IV Azactam, IV Ativan 0.25 mg, IV Zofran 4 mg, 2.497 mL fluid bolus, IV vancomycin.    Patient will be admitted to the PCU for further evaluation and monitoring.     ---------------------------------------------------------------------------------------------------------------------   Review of Systems   Unable to perform ROS: Dementia (History obtained from Daughter (Eri) at bedside)   Constitutional: Positive for activity change (Decreased since recent wrist injury), appetite change (Decreased) and fever (T-max 99.8).   Respiratory: Negative for choking and shortness of breath.    Gastrointestinal: Negative for nausea and vomiting.   Genitourinary:        Reports foul-smelling urine   Musculoskeletal:        Swelling of right upper extremity        ---------------------------------------------------------------------------------------------------------------------   Past Medical History:   Diagnosis Date   • Arthritis    • Ascending aortic aneurysm 10/6/2016   • Chronic hypokalemia 10/6/2016   • Colonic polyp 10/6/2016   • Dementia (HCC)    • Dyslipidemia 10/6/2016   • Gastroparesis 10/6/2016   • GERD with esophagitis 10/6/2016   • H/O blood clots    • Hypertension 10/6/2016   • IBS (irritable bowel syndrome) 10/6/2016   • IHD (ischemic heart disease) 10/6/2016   • Osteoarthritis 10/6/2016   • Osteoarthritis of left hip 10/6/2016   • Osteoporosis 10/6/2016   • Parkinson's disease (HCC) 10/6/2016   • Peripheral edema 10/6/2016   • Syncope 10/6/2016     Past Surgical History:   Procedure Laterality Date   • APPENDECTOMY      As a child   • CATARACT EXTRACTION WITH INTRAOCULAR LENS IMPLANT Bilateral     2006,2008   • CORONARY ANGIOPLASTY WITH STENT PLACEMENT  2009   • CORONARY ARTERY BYPASS GRAFT  2000    x3   • GASTRECTOMY PARTIAL / TOTAL      1970s   •  LAPAROSCOPIC CHOLECYSTECTOMY     • SPLENECTOMY     • TONSILLECTOMY AND ADENOIDECTOMY      As a child   • TOTAL ABDOMINAL HYSTERECTOMY WITH SALPINGO OOPHORECTOMY       Family History   Problem Relation Age of Onset   • Tuberculosis Mother    • No Known Problems Father      Social History     Socioeconomic History   • Marital status:    Tobacco Use   • Smoking status: Former     Packs/day: 1.00     Types: Cigarettes     Quit date: 1992     Years since quittin.5   • Smokeless tobacco: Never   Vaping Use   • Vaping Use: Never used   Substance and Sexual Activity   • Alcohol use: No   • Drug use: No   • Sexual activity: Defer     ---------------------------------------------------------------------------------------------------------------------   Allergies:  Penicillins, Reglan [metoclopramide], and Sulfa antibiotics  ---------------------------------------------------------------------------------------------------------------------   Home medications:    Medications below are reported home medications pulling from within the system; at this time, these medications have not been reconciled unless otherwise specified and are in the verification process for further verifcation as current home medications.  (Not in a hospital admission)      Hospital Scheduled Meds:          Current listed hospital scheduled medications may not yet reflect those currently placed in orders that are signed and held awaiting patient's arrival to floor.   ---------------------------------------------------------------------------------------------------------------------     Objective     Vital Signs:  Temp:  [97.7 °F (36.5 °C)] 97.7 °F (36.5 °C)  Heart Rate:  [70-80] 80  Resp:  [18] 18  BP: ()/(57-73) 113/73      22  1539   Weight: 49.9 kg (110 lb)     Body mass index is 20.78 kg/m².  ---------------------------------------------------------------------------------------------------------------------        Physical Exam  Constitutional:       General: She is awake.      Appearance: She is normal weight.      Comments: Patient resting in bed.  Becomes agitated when performing exam. Appears in no acute distress.    HENT:      Head: Normocephalic and atraumatic.      Right Ear: External ear normal.      Left Ear: External ear normal.      Nose: Nose normal.      Mouth/Throat:      Mouth: Mucous membranes are dry.      Pharynx: Oropharynx is clear.   Eyes:      Extraocular Movements: Extraocular movements intact.      Conjunctiva/sclera: Conjunctivae normal.      Pupils: Pupils are equal, round, and reactive to light.   Cardiovascular:      Rate and Rhythm: Normal rate and regular rhythm.      Pulses: Normal pulses.           Radial pulses are 2+ on the right side and 2+ on the left side.        Dorsalis pedis pulses are 2+ on the right side and 2+ on the left side.        Posterior tibial pulses are 2+ on the right side and 2+ on the left side.      Heart sounds: Normal heart sounds. No murmur heard.    No friction rub. No gallop.   Pulmonary:      Effort: Pulmonary effort is normal. No accessory muscle usage or respiratory distress.      Breath sounds: Normal breath sounds. No decreased breath sounds, wheezing, rhonchi or rales.   Abdominal:      General: Abdomen is flat. Bowel sounds are normal. There is no distension.      Palpations: Abdomen is soft.      Tenderness: There is no abdominal tenderness. There is no guarding.   Musculoskeletal:         General: Swelling present.      Cervical back: Normal range of motion and neck supple.      Right lower leg: No edema.      Left lower leg: No edema.      Comments: Right wrist noted to be edematous. Brace in place.    Skin:     General: Skin is warm and dry.      Findings: No erythema or rash.   Neurological:      Mental Status: She is alert.      Comments: Difficult to exam as patient not answering questions or participating in exam. Does have dementia at baseline    Psychiatric:         Behavior: Behavior is uncooperative and agitated.      Comments: Becomes agitated when attempting to examine.          ---------------------------------------------------------------------------------------------------------------------  EKG:     Pending cardiology interpretation.  Per my review, EKG shows normal sinus rhythm with heart rate 72 and  ms without acute ischemic changes.    I have personally looked at both the EKG and the telemetry strips.  ---------------------------------------------------------------------------------------------------------------------   Results from last 7 days   Lab Units 11/11/22 1938 11/11/22 1646 11/08/22 2111   CRP mg/dL  --  3.92*  --    LACTATE mmol/L 1.1 3.2*  --    WBC 10*3/mm3  --  7.76 11.33*   HEMOGLOBIN g/dL  --  12.0 11.4*   HEMATOCRIT %  --  37.6 36.2   MCV fL  --  92.2 94.0   MCHC g/dL  --  31.9 31.5   PLATELETS 10*3/mm3  --  309 276         Results from last 7 days   Lab Units 11/11/22 1646 11/08/22 2111   SODIUM mmol/L 140 136   POTASSIUM mmol/L 3.7 4.4   CHLORIDE mmol/L 105 101   CO2 mmol/L 18.6* 22.6   BUN mg/dL 46* 25*   CREATININE mg/dL 1.28* 1.05*   CALCIUM mg/dL 9.3 8.9   GLUCOSE mg/dL 143* 111*   ALBUMIN g/dL 3.29* 3.26*   BILIRUBIN mg/dL 0.2 0.4   ALK PHOS U/L 88 73   AST (SGOT) U/L 24 16   ALT (SGPT) U/L 10 <5   Estimated Creatinine Clearance: 22.1 mL/min (A) (by C-G formula based on SCr of 1.28 mg/dL (H)).  No results found for: AMMONIA  Results from last 7 days   Lab Units 11/11/22  1938 11/11/22  1646 11/08/22  2111   CK TOTAL U/L  --   --  48   TROPONIN T ng/mL <0.010 <0.010  --      Results from last 7 days   Lab Units 11/11/22  1646   PROBNP pg/mL 1,369.0     Lab Results   Component Value Date    HGBA1C 6.6 (H) 08/06/2015     Lab Results   Component Value Date    TSH 4.850 (H) 11/11/2022    FREET4 0.72 (L) 11/11/2022     No results found for: PREGTESTUR, PREGSERUM, HCG, HCGQUANT  Pain Management Panel     Pain  Management Panel Latest Ref Rng & Units 8/5/2015    AMPHETAMINES SCREEN, URINE NEGATIVE Negative    BARBITURATES SCREEN NEGATIVE Negative    BENZODIAZEPINE SCREEN, URINE NEGATIVE Negative    COCAINE SCREEN, URINE NEGATIVE Negative    METHADONE SCREEN, URINE NEGATIVE Negative            ---------------------------------------------------------------------------------------------------------------------  Imaging Results (Last 7 Days)     Procedure Component Value Units Date/Time    CT Upper Extremity Right Without Contrast [[id number]] Collected: 11/11/22 1944     Updated: 11/11/22 1946    Narrative:      CT UE RT WO    INDICATION:    Right wrist pain and swelling.    TECHNIQUE:   CT of the right upper extremity. without IV contrast. Coronal and sagittal reconstructions were obtained.  Radiation dose reduction techniques included automated exposure control or exposure modulation based on body size. Count of known CT and cardiac  nuc med studies performed in previous 12 months: 0.     COMPARISON:    Right wrist x-rays 11/11/2022 and 11/8/2022    FINDINGS:  Examination is markedly limited secondary to patient positioning and motion artifact. Evaluation of the right wrist is essentially nondiagnostic secondary to motion on the exam. Remainder of the visualized right upper extremity appears grossly  unremarkable without evidence of a displaced fracture or dislocation.      Impression:      Essentially nondiagnostic exam secondary to patient positioning and motion artifact. The right wrist cannot be evaluated on the exam. No other gross acute findings.    Signer Name: Sheldon Jose MD   Signed: 11/11/2022 7:44 PM   Workstation Name: [email or web address]-    [facility]    CT Chest Without Contrast Diagnostic [[id number]] Collected: 11/11/22 1940     Updated: 11/11/22 1942    Narrative:      CT Chest WO, CT Abdomen Pelvis WO    INDICATION:   Sepsis. Altered mental status. Dementia.    TECHNIQUE:   CT of the  chest, abdomen and pelvis without IV contrast. Coronal and sagittal reconstructions were obtained.  Radiation dose reduction techniques included automated exposure control or exposure modulation based on body size. Count of known CT and cardiac  nuc med studies performed in previous 12 months: 0.     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 Extensive atherosclerotic calcification throughout the thoracic aorta. Cardiomegaly. No pericardial effusions. Prior CABG. Mild left basilar atelectasis/infiltrate. No pleural effusions or pneumothorax.    Abdomen:   The liver is within normal limits. Spleen is not well visualized. Pancreas is grossly unremarkable. Suspected cholecystectomy. Both adrenal glands are normal. Stable right renal cyst. Kidneys are otherwise grossly unremarkable allowing for lack of IV  contrast. Abdominal aorta normal in course and caliber. Small bowel is unremarkable without obstruction. Normal appendix. The colon is unremarkable. No free fluid or free air.    Pelvis:   The urinary bladder is unremarkable.  Pelvic viscera is grossly unremarkable. No free pelvic fluid.    No acute osseous abnormality.        Impression:        1. Mild left basilar atelectasis/infiltrate.  2. 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  3. Cardiomegaly.  4. No gross acute abdominal or pelvic findings.          Signer Name: Sheldon Jose MD   Signed: 11/11/2022 7:40 PM   Workstation Name: [email or web address]    [facility]    CT Abdomen Pelvis Without Contrast [[id number]] Collected: 11/11/22 1940     Updated: 11/11/22 1942    Narrative:      CT Chest WO, CT Abdomen Pelvis WO    INDICATION:   Sepsis. Altered mental status. Dementia.    TECHNIQUE:   CT of the chest, abdomen and pelvis without IV contrast. Coronal and sagittal reconstructions were obtained.  Radiation dose reduction techniques included automated exposure control or  exposure modulation based on body size. Count of known CT and cardiac  nuc med studies performed in previous 12 months: 0.     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 Extensive atherosclerotic calcification throughout the thoracic aorta. Cardiomegaly. No pericardial effusions. Prior CABG. Mild left basilar atelectasis/infiltrate. No pleural effusions or pneumothorax.    Abdomen:   The liver is within normal limits. Spleen is not well visualized. Pancreas is grossly unremarkable. Suspected cholecystectomy. Both adrenal glands are normal. Stable right renal cyst. Kidneys are otherwise grossly unremarkable allowing for lack of IV  contrast. Abdominal aorta normal in course and caliber. Small bowel is unremarkable without obstruction. Normal appendix. The colon is unremarkable. No free fluid or free air.    Pelvis:   The urinary bladder is unremarkable.  Pelvic viscera is grossly unremarkable. No free pelvic fluid.    No acute osseous abnormality.        Impression:        1. Mild left basilar atelectasis/infiltrate.  2. 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  3. Cardiomegaly.  4. No gross acute abdominal or pelvic findings.          Signer Name: Sheldon Jose MD   Signed: 11/11/2022 7:40 PM   Workstation Name: [email or web address]-    [facility]    CT Head Without Contrast [[id number]] Collected: 11/11/22 1936     Updated: 11/11/22 1938    Narrative:      CT Head WO    HISTORY: AMS    COMPARISON: CT brain 6/27/2018.    TECHNIQUE: CT of the brain without IV contrast. Coronal and sagittal reconstructions were obtained.  Radiation dose reduction techniques included automated exposure control or exposure modulation based on body size. Count of known CT and cardiac nuc med  studies performed in previous 12 months: 0.     Time of Scan: 6:53 PM    FINDINGS:    No acute intracranial hemorrhage, mass effect, midline shift, or hydrocephalus.      Gray-white matter differentiation is within normal limits. Patchy hypodensities in the cerebral white matter, nonspecific but likely representing chronic microvascular ischemic changes.     Ventricles and cortical sulci are prominent, in keeping with cerebral volume loss. Basal cisterns are clear.     Calvarium is intact. Biparietal calvarial thinning.    Visualized paranasal sinuses are clear. Visualized mastoid air cells are clear.      Impression:        1.  No acute intracranial abnormality.  2.  Chronic microvascular ischemic disease and cerebral atrophy, progressed compared to prior study 6/27/2018.    Signer Name: Pedro Jerome MD   Signed: 11/11/2022 7:36 PM   Workstation Name: [id number]    [facility]    XR Chest 1 View [[id number]] Collected: 11/11/22 1619     Updated: 11/11/22 1622    Narrative:      EXAM:    XR Chest, 1 View     EXAM DATE:    11/11/2022 3:46 PM     CLINICAL HISTORY:    AMS     TECHNIQUE:    Frontal view of the chest.     COMPARISON:    03/16/2019     FINDINGS:    Lungs:  Pulmonary vascular congestion.  Interstitial thickening.  Left  basilar airspace disease.    Pleural space:  Unremarkable.  No pneumothorax.    Heart:  Cardiomegaly.  CABG.    Mediastinum:  Unremarkable.    Bones/joints:  Stable bony structures.       Impression:      1.  CHF/edema.  2.  Mild left basilar airspace disease.     This report was finalized on 11/11/2022 4:20 PM by Dr. Edwin Mike MD.           I have personally reviewed the above radiology images and read the final radiology report on 11/11/22  ---------------------------------------------------------------------------------------------------------------------  Assessment / Plan     Active Hospital Problems    Diagnosis  POA   • **Severe sepsis (HCC) [A41.9, R65.20]  Yes       ASSESSMENT/PLAN:  -Possible left lower lobe pneumonia, HCAP vs aspiration, POA  -Acute urinary tract infection, POA  -Severe sepsis criteria secondary to  above with C-RP >2, lactate 3.2, AMS, hypotension responding to fluid rescuscitation, POA  -Anion-gap metabolic acidosis likely due to above, POA  -UA with trace blood, 1+ leukocytes, 3-5 RBC, 6-12 WBC, and 1+ bacteria.  -Urine sample sent for culture, follow-up on results.   -CT chest with mild left basilar atelectasis/infiltrate.   -Procalcitonin normal.   -Received IV Vancomycin and Azactam in ED; will continue on admission and will add IV Flagyl to cover for possible aspiration.  -Received sepsis bolus in ED (1497 mL); continuous IV fluids ordered.  -Obtain MRSA nasal swab.   -Repeat a.m. CBC, CMP, and C-RP. Trend lactate until normalized.   -SLP consulted to evaluate for dysphagia/silent aspiration.   -Aspiration precautions in place.    -Altered mental status, suspect metabolic encephalopathy 2/2 above, POA  -Parkinson's disease  -Dementia  -Debility  -CT head without acute abnormality.   -Continue treatment as outlined above and monitor for improvement.   -Up with assistance; fall precautions.   -Aspiration precautions in place.   -Palliative care consulted for [facility] discussions.    -Recent right wrist injury, mechanism of injury unknown  -Appointment with orthopedic surgery 11/12 and per daughter's report patient has possible wrist fracture and ligament tear, no surgery recommended at this time, and continue use of wrist brace.  -CT RUE nondiagnostic due to patient position and motion artifact.   -Continue outpatient follow-up with orthopedic surgery.     -Ascending aortic aneurysm, 4 cm  -CT abdomen/pelvis with stable appearance of ascending aortic aneurysm compared to imaging from 08/2020.  -Continue close follow-up and monitoring in the outpatient setting.     -CAD s/p 3 vessel CABG and stenting  -Troponin T negative x 2.   -EKG without acute ischemic changes.   -Review home medications once reconciled.     -Essential hypertension, currently with hypotension  -Hyperlipidemia  -Currently with hypotension (BP  87/54); daughter requesting more time to discuss with family about initiating vasopressors; will start continuous IV fluids in the meantime while awaiting family's decision.   -Monitor per floor protocol.   -Hold home anti-hypertensives until BP stabilizes.     -Hypothyroidism  -TSH 4.850. Free T4 0.72.   -May need dose adjustment of Synthroid once reconciled and repeat thyroid function tests in the outpatient setting.     -Osteoarthritis  -Supportive care.   -Review home medications once reconciled.    -Irritable bowel syndrome  -Review home medications once reconciled.   -------------------------------  F/E/N: Continuous IV fluids. Replace electrolytes as needed. NPO diet.   DVT prophylaxis: SQ Heparin  Activity: Up with assistance, fall precautions  -------------------------------  CODE STATUS: DNR/DNI; Daughter, Eri, requesting more time to discuss with other family members if want to proceed with vasopressors.    High risk secondary to severe sepsis, acute UTI, possible pneumonia of left lower lobe, metabolic encephalopathy in setting of dementia, recent right wrist injury  -------------------------------  Expected length of stay:  INPATIENT status due to the need for care which can only be reasonably provided in an hospital setting such as aggressive/expedited ancillary services and/or consultation services, the necessity for IV medications, close physician monitoring and/or the possible need for procedures.  In such, I feel patient's risk for adverse outcomes and need for care warrant INPATIENT evaluation and predict the patient's care encounter to likely last beyond 2 midnights.     Disposition: Pending clinical course. Likely back to nursing home once medically stable.    Janet Parker PA-C  11/11/22  22:52 EST    ---------------------------------------------------------------------------------------------------------------------           Electronically signed by Tyrone Betancourt MD at  11/12/22 0143       Vital Signs (last day)     Date/Time Temp Temp src Pulse Resp BP Patient Position SpO2    11/14/22 1200 98.3 (36.8) Oral -- -- -- -- --    11/14/22 0800 97.9 (36.6) Oral 57 -- 146/84 -- 96    11/14/22 0700 -- -- 52 -- 137/71 -- 96    11/14/22 0600 -- -- 54 -- 134/75 -- 98    11/14/22 0500 -- -- 56 -- 128/69 -- 95    11/14/22 0400 98.1 (36.7) Axillary 51 -- 130/67 -- 97    11/14/22 0300 -- -- 54 -- 142/76 -- 96    11/14/22 0200 -- -- 54 -- 108/60 -- 95    11/14/22 0100 -- -- 56 -- 121/66 -- 96    11/14/22 0000 98.3 (36.8) Axillary 53 -- 143/87 -- 97    11/13/22 2300 -- -- 56 -- 143/79 -- 99    11/13/22 2100 -- -- 54 -- 113/60 -- 96    11/13/22 2000 97.8 (36.6) Axillary 56 -- 114/62 -- 98    11/13/22 1900 -- -- 55 -- 128/84 -- 97    11/13/22 1801 -- -- 60 -- 158/95 -- 97    11/13/22 1700 -- -- 57 -- 159/94 -- 99    11/13/22 1600 97.1 (36.2) Axillary 54 18 141/90 -- 96    11/13/22 1500 -- -- 56 -- 148/81 -- 98    11/13/22 1400 -- -- 51 -- 147/79 -- 97    11/13/22 1300 -- -- -- -- 146/76 -- 98    11/13/22 1200 97.7 (36.5) Oral 55 16 140/85 -- 95    11/13/22 1100 -- -- 61 -- 154/88 -- 98    11/13/22 1000 -- -- 58 -- 135/82 -- 94    11/13/22 0900 -- -- 59 -- 132/102 -- 95    11/13/22 0800 98.6 (37) Axillary 67 -- 128/106 -- 95    11/13/22 0602 -- -- 62 -- 148/78 -- 95    11/13/22 0502 -- -- 64 -- 146/83 -- 94    11/13/22 0402 97.8 (36.6) Axillary 65 -- 149/86 -- 96    11/13/22 0302 -- -- 64 -- 137/79 -- --    11/13/22 0202 -- -- 70 -- 134/94 -- --    11/13/22 0102 -- -- 64 -- 143/79 -- --    11/13/22 0002 -- -- 66 -- 133/72 -- 95    11/13/22 0000 97.9 (36.6) Axillary -- -- -- -- --            Current Facility-Administered Medications   Medication Dose Route Frequency Provider Last Rate Last Admin   • acetaminophen (TYLENOL) tablet 500 mg  500 mg Oral Q6H PRN Althea Ivy DO       • aspirin EC tablet 81 mg  81 mg Oral Daily Althea Ivy DO   81 mg at 11/14/22 0851   • aztreonam (AZACTAM) 2 g in  sodium chloride 0.9 % 100 mL IVPB-VTB  2 g Intravenous Q8H Althea Ivy DO   2 g at 11/14/22 1353   • bisacodyl (DULCOLAX) suppository 10 mg  10 mg Rectal Daily PRN Althea Ivy DO       • carbidopa-levodopa (SINEMET)  MG per tablet 1 tablet  1 tablet Oral BID Althea Ivy DO   1 tablet at 11/14/22 0851   • cetirizine (zyrTEC) tablet 10 mg  10 mg Oral Daily Althea Ivy DO   10 mg at 11/14/22 0851   • clonazePAM (KlonoPIN) tablet 0.25 mg  0.25 mg Oral BID PRN Gareth Mendez DO       • clopidogrel (PLAVIX) tablet 75 mg  75 mg Oral Daily Althea Ivy DO   75 mg at 11/14/22 0851   • divalproex (DEPAKOTE) DR tablet 250 mg  250 mg Oral Nightly Althea Ivy DO   250 mg at 11/13/22 2157   • docusate sodium (COLACE) capsule 200 mg  200 mg Oral Daily Althea Ivy,    200 mg at 11/14/22 0851   • heparin (porcine) 5000 UNIT/ML injection 5,000 Units  5,000 Units Subcutaneous Q8H Gareth Mendez DO   5,000 Units at 11/14/22 1354   • levothyroxine (SYNTHROID, LEVOTHROID) tablet 50 mcg  50 mcg Oral Daily Althea Ivy DO   50 mcg at 11/14/22 0851   • metroNIDAZOLE (FLAGYL) IVPB 500 mg  500 mg Intravenous Q8H Tyrone Betancourt MD   500 mg at 11/14/22 0918   • multivitamin (THERAGRAN) tablet 1 tablet  1 tablet Oral Daily Althea Ivy DO   1 tablet at 11/14/22 0851   • nitroglycerin (NITROSTAT) SL tablet 0.4 mg  0.4 mg Sublingual Q5 Min PRN Tyrone Betancourt MD       • pantoprazole (PROTONIX) EC tablet 40 mg  40 mg Oral Daily Althea Ivy DO   40 mg at 11/13/22 0801   • potassium chloride ER (K-TAB) ER tablet 40 mEq  40 mEq Oral PRN Althea Ivy DO        Or   • potassium chloride (KLOR-CON) packet 40 mEq  40 mEq Oral PRN Althea Ivy DO        Or   • potassium chloride 10 mEq in 100 mL IVPB  10 mEq Intravenous Q1H PRN Althea Ivy DO       • QUEtiapine (SEROquel) tablet 12.5 mg  12.5 mg Oral Nightly Gareth Mendez,        • senna (SENOKOT) tablet  "1 tablet  1 tablet Oral Daily Althea Ivy DO   1 tablet at 22 0851   • senna (SENOKOT) tablet 1 tablet  1 tablet Oral Daily PRN Althea Ivy DO       • sertraline (ZOLOFT) tablet 50 mg  50 mg Oral Nightly Althea Ivy DO   50 mg at 22 2157   • sodium chloride 0.9 % flush 10 mL  10 mL Intravenous PRN Tyrone Betancourt MD       • sodium chloride 0.9 % flush 10 mL  10 mL Intravenous Q12H Tyrone Betancourt MD   10 mL at 22 0852   • sodium chloride 0.9 % flush 10 mL  10 mL Intravenous PRN Tyrone Betancourt MD       • traMADol (ULTRAM) tablet 50 mg  50 mg Oral Q12H PRN Althea Ivy DO   50 mg at 22 2018     Operative/Procedure Notes (most recent note)    No notes of this type exist for this encounter.            Physician Progress Notes (most recent note)      Althea Ivy DO at 22 1134              [facility]     Patient Identification:  Name:  Marilyn Sood  Age:  89 y.o.  Sex:  female  :  1930  MRN:  [id number]  Visit Number:  [id number]  ROOM: [address]     Primary Care Provider:  Jarod Burnett MD    Length of stay in inpatient status:  2    Subjective     Chief Compliant:    Chief Complaint   Patient presents with   • Altered Mental Status       History of Presenting Illness:    Patient seen and examined this morning in PCU room 211, no family present at bedside. I spoke to her am nurse, DALIA Peña earlier this morning and she reported that patient was very agitated and distressed due to confusion. Said that her daughter reported she needed a dose of ativan in the ER for similar symptoms, and she \"hated to see her that way.\" One time low dose ativan given and patient has been resting comfortably since then. On my exam patient is sleeping soundly and doesn't give helpful history at this time.    Objective     Current Hospital Meds:aspirin, 81 mg, Oral, Daily  aztreonam, 2 g, Intravenous, " Q12H  carbidopa-levodopa, 1 tablet, Oral, BID  cetirizine, 10 mg, Oral, Daily  clopidogrel, 75 mg, Oral, Daily  divalproex, 250 mg, Oral, Nightly  docusate sodium, 200 mg, Oral, Daily  heparin (porcine), 5,000 Units, Subcutaneous, Q12H  levothyroxine, 50 mcg, Oral, Daily  metroNIDAZOLE, 500 mg, Intravenous, Q8H  multivitamin, 1 tablet, Oral, Daily  pantoprazole, 40 mg, Oral, Daily  senna, 1 tablet, Oral, Daily  sertraline, 50 mg, Oral, Nightly  sodium chloride, 10 mL, Intravenous, Q12H    sodium chloride, 125 mL/hr, Last Rate: 125 mL/hr (11/13/22 0334)        Current Antimicrobial Therapy:  Anti-Infectives (From admission, onward)    Ordered     Dose/Rate Route Frequency Start Stop    11/11/22 2347  aztreonam (AZACTAM) 2 g in sodium chloride 0.9 % 100 mL IVPB-VTB        Ordering Provider: Tyrone Betancourt MD    2 g  over 4 Hours Intravenous Every 12 Hours 11/12/22 0600 11/17/22 0559    11/11/22 2323  metroNIDAZOLE (FLAGYL) IVPB 500 mg        Ordering Provider: Tyrone Betancourt MD    500 mg  over 30 Minutes Intravenous Every 8 Hours 11/12/22 0100 11/17/22 0059    11/11/22 1731  aztreonam (AZACTAM) 2 g in sodium chloride 0.9 % 100 mL IVPB-VTB        Ordering Provider: Ivy Villalpando PA    2 g  200 mL/hr over 30 Minutes Intravenous Once 11/11/22 1733 11/11/22 1827    11/11/22 1731  vancomycin (VANCOCIN) 1,000 mg in sodium chloride 0.9 % 250 mL IVPB        Ordering Provider: Ivy Villalpando PA    20 mg/kg × 49.9 kg Intravenous Once 11/11/22 1733 11/11/22 2132        Current Diuretic Therapy:  Diuretics (From admission, onward)    None        ----------------------------------------------------------------------------------------------------------------------  Vital Signs:  Temp:  [97.8 °F (36.6 °C)-98.6 °F (37 °C)] 98.6 °F (37 °C)  Heart Rate:  [58-71] 58  Resp:  [16-18] 18  BP: ()/() 135/82  SpO2:  [92 %-96 %] 94 %  on   ;   Device (Oxygen Therapy): room air  Body mass index is  22.67 kg/m².    Wt Readings from Last 3 Encounters:   11/13/22 54.4 kg (120 lb)   11/11/22 48.1 kg (106 lb)   11/08/22 48.1 kg (106 lb)     Intake & Output (last 3 days)       11/10 0701 11/11 0700 11/11 0701 11/12 0700 11/12 0701 11/13 0700 11/13 0701 11/14 0700    P.O.  0 120 0    I.V. (mL/kg)  1979.9 (38.9) 3000 (55.1)     IV Piggyback  1847 550     Total Intake(mL/kg)  3826.9 (75.2) 3670 (67.5) 0 (0)    Urine (mL/kg/hr)   600 (0.5) 500 (2)    Total Output   600 500    Net  +3826.9 +3070 -500            Urine Unmeasured Occurrence  1 x 1 x         Diet Pureed; Thin  ----------------------------------------------------------------------------------------------------------------------  Physical exam:   Constitutional:  Well-developed and well-nourished.  No acute distress.      HENT:  Head:  Normocephalic and atraumatic.  Mouth:  Moist mucous membranes.    Cardiovascular:  Normal rate, regular rhythm and normal heart sounds with no murmur.  Pulmonary/Chest:  No respiratory distress. No wheeze. Coarse breath sounds bilaterally  Abdominal:  Soft. No distension and no tenderness.   Musculoskeletal:  No tenderness, and no deformity.  No red or swollen joints anywhere.    Neurological:  Sleeping soundly    Skin:  Skin is warm and dry.   Peripheral vascular:  No cyanosis, no edema.    ----------------------------------------------------------------------------------------------------------------------  Results from last 7 days   Lab Units 11/13/22  0805 11/12/22 0347 11/11/22 1938 11/11/22  1646   CRP mg/dL  --  2.85*  --  3.92*   LACTATE mmol/L  --   --  1.1 3.2*   WBC 10*3/mm3 9.94 13.65*  --  7.76   HEMOGLOBIN g/dL 10.7* 10.4*  --  12.0   HEMATOCRIT % 35.6 32.7*  --  37.6   MCV fL 99.7* 93.2  --  92.2   MCHC g/dL 30.1* 31.8  --  31.9   PLATELETS 10*3/mm3 248 248  --  309         Results from last 7 days   Lab Units 11/13/22 0805 11/12/22 2053 11/12/22 0347 11/11/22  1646 11/08/22  2111   SODIUM mmol/L 145  --   142 140 136   POTASSIUM mmol/L 4.8 4.5 3.4* 3.7 4.4   CHLORIDE mmol/L 118*  --  111* 105 101   CO2 mmol/L 18.2*  --  19.0* 18.6* 22.6   BUN mg/dL 16  --  37* 46* 25*   CREATININE mg/dL 0.59  --  0.99 1.28* 1.05*   CALCIUM mg/dL 8.1*  --  8.0* 9.3 8.9   GLUCOSE mg/dL 88  --  156* 143* 111*   ALBUMIN g/dL  --   --  2.52* 3.29* 3.26*   BILIRUBIN mg/dL  --   --  0.2 0.2 0.4   ALK PHOS U/L  --   --  67 88 73   AST (SGOT) U/L  --   --  22 24 16   ALT (SGPT) U/L  --   --  10 10 <5   Estimated Creatinine Clearance: 52.2 mL/min (by C-G formula based on SCr of 0.59 mg/dL).  No results found for: AMMONIA  Results from last 7 days   Lab Units 11/11/22  1938 11/11/22  1646 11/08/22  2111   CK TOTAL U/L  --   --  48   TROPONIN T ng/mL <0.010 <0.010  --      Results from last 7 days   Lab Units 11/11/22  1646   PROBNP pg/mL 1,369.0         No results found for: HGBA1C, POCGLU  Lab Results   Component Value Date    TSH 4.850 (H) 11/11/2022    FREET4 0.72 (L) 11/11/2022     No results found for: PREGTESTUR, PREGSERUM, HCG, HCGQUANT  Pain Management Panel     Pain Management Panel Latest Ref Rng & Units 8/5/2015    AMPHETAMINES SCREEN, URINE NEGATIVE Negative    BARBITURATES SCREEN NEGATIVE Negative    BENZODIAZEPINE SCREEN, URINE NEGATIVE Negative    COCAINE SCREEN, URINE NEGATIVE Negative    METHADONE SCREEN, URINE NEGATIVE Negative        Brief Urine Lab Results  (Last result in the past 365 days)      Color   Clarity   Blood   Leuk Est   Nitrite   Protein   CREAT   Urine HCG        11/11/22 1951 Dark Yellow   Clear   Trace   Small (1+)   Negative   Negative               Blood Culture   Date Value Ref Range Status   11/11/2022 No growth at 24 hours  Preliminary   11/11/2022 No growth at 24 hours  Preliminary     Urine Culture   Date Value Ref Range Status   11/11/2022 <10,000 CFU/mL Normal Urogenital Amira  Final     No results found for: WOUNDCX  No results found for: STOOLCX  No results found for: RESPCX  No results found for:  AFBCX  Results from last 7 days   Lab Units 11/12/22  0347 11/11/22  1938 11/11/22  1646   PROCALCITONIN ng/mL  --   --  0.09   LACTATE mmol/L  --  1.1 3.2*   CRP mg/dL 2.85*  --  3.92*       I have personally looked at the labs and they are summarized above.  ----------------------------------------------------------------------------------------------------------------------  Detailed radiology reports for the last 24 hours:  Imaging Results (Last 24 Hours)     ** No results found for the last 24 hours. **        Assessment & Plan    #Left lower lobe pneumonia, HAP versus aspiration, present on admission   #Abnormal UA, present on admission   #Severe sepsis criteria met CRP greater than 2, lactate 3.2, altered mental status, hypotension responding to fluid resuscitation--secondary to UTI and pneumonia, present on admission   #Anion gap metabolic acidosis likely secondary to above, present on admission   - Patient much improved today, no longer hypotensive  - CRP improving, lactate normalized  - Azactam, vanc, and flagyl started at admission (patient has anaphylactic allergy to penicillins). MRSA screen negative so will discontinue vancomycin today. Continue azactam and flagyl.  - UA was suggestive of UTI on admission, but urine culture has now resulted with growth of <10,000 CFU/mL normal urogenital edilberto, ruling out clinically significant UTI.   - Follow up blood culture results  - SLP evaluated patient and diet recommendations have been made. No evidence of aspiration or silent aspiration noted during their evaluation.  - Repeat cbc, bmp in am    #Altered mental status, suspect metabolic encephalopathy 2/2 above, POA  #Parkinson's disease  #Dementia  #Debility  - No acute abnormality on CT head at admission. Continue treatment of underlying infections.   - Palliative care consulted for [facility] discussions. I discussed treatment goals with family at bedside today also, and they wish to continue treatment of her  infections but want to avoid invasive aggressive measures like intubation or a central line.  - Continue home sertraline, depakote, and carbidopa-levodopa  - One time low dose ativan needed today due to significant agitation and patient distress    #Right wrist injury  - Unfortunately unable to evaluate with CT due to patient positioning yesterday. Reportedly patient following with Ortho outpatient for possible wrist fracture and ligament tear. Continue brace. Follow up with Ortho outpatient.    #Ascending aortic aneurysm, 4 cm  - Stable in size on CT abd/pelvis at admission  - Monitor. Recommend outpatient follow up.    #Coronary artery disease s/p 3 vessel CABG and stenting  - Troponin negative x2 at admission, EKG without acute ischemic changes.  - Continue home plavix  - Home torsemide held at admission due to hypotension. Monitor fluid status closely. Currently appears euvolemic so will continue to hold, but consider restarting in the next few days.    #Essential hypertension, with hypotension on admission  #Hypotension present on admission, now resolved  #Hyperlipidemia  - Holding antihypertensives due to hypotension. Monitor per hospital protocol. Family did not want central line which would be needed for vasopressors. BP is much improved but still within normal range. Restart antihypertensives if it is staying significantly elevated.    #Hypothyroidism  - Continue home levothyroxine  - TSH 4.85, free T4 0.72. May need adjustment in outpatient setting after repeating thyroid function tests.     #Osteoarthritis  - Continue supportive care. Home tramadol reordered but with parameters to hold if SBP<100, HR<60, or signs of respiratory depression.    #Irritable bowel syndrome  - Continue home dulcolax, senna  Edited by: Althea Ivy DO at 11/13/2022 1134    VTE Prophylaxis:   Mechanical Order History:     None      Pharmalogical Order History:      Ordered     Dose Route Frequency Stop    11/11/22 7186   heparin (porcine) 5000 UNIT/ML injection 5,000 Units         5,000 Units SC Every 12 Hours Scheduled --                Dispo: pending clinical improvement    Althea Ivy DO  [facility]  11/13/22  11:34 EST      Electronically signed by Althea Ivy DO at 11/13/22 1144          Consult Notes (most recent note)      Kristian Henley MD at 11/14/22 1404      Consult Orders    1. Inpatient Palliative Care MD Consult [[id number]] ordered by Tyrone Betancourt MD at 11/11/22 2344               Palliative Care Initial Consult     Attending Physician: Gareth Mendez*  Referring Provider: Dr. Mendez      Code Status: No CPR  Code Status and Medical Interventions:   Ordered at: 11/12/22 0144     Medical Intervention Limits:    NO intubation (DNI)     Level Of Support Discussed With:    Health Care Surrogate     Code Status (Patient has no pulse and is not breathing):    No CPR (Do Not Attempt to Resuscitate)     Medical Interventions (Patient has pulse or is breathing):    Limited Support     Comments:    no central line; if vasopressors given, could only be given short term through peripheral line      Advanced Directives: Advance Directive Status: Patient has advance directive, copy in chart (copy in pts chartlet but not in epic)   Healthcare surrogate: Daughter, Eri      HPI: 89-year-old female with history of coronary artery disease, previous CABG and stenting, hypertension, hyperlipidemia, Parkinson's disease, dementia, previous tobacco use, and irritable bowel syndrome.  The patient lives in a skilled nursing facility for her care, and her daughter Eri is her healthcare surrogate and is at bedside.  The daughter states that the patient has been quite stable up until a week ago at which time she complained of right wrist and hand pain and was noted to have marked swelling.  She was found to have a fracture with ligament tear but decision made not to do surgery due to her  overall condition and decline.  Since that time, the patient has become increasingly confused, agitated, and has not been eating or drinking much.  She was noted to have foul smelling urine and found to have a urinary tract infection.  Today, we reviewed the patient's condition with her daughter, reviewed palliative measures as well as hospice services.  The daughter was quite tearful as she  explained her mother's decline, and states that she does not want to see her mother suffer.  After lengthy discussion, she agreed to a hospice consult for better pain management and closer follow-up when she is discharged.          ROS: Negative except as above in HPI.     Past Medical History:   Diagnosis Date   • Arthritis    • Ascending aortic aneurysm 10/6/2016   • Chronic hypokalemia 10/6/2016   • Colonic polyp 10/6/2016   • Dementia (HCC)    • Dyslipidemia 10/6/2016   • Gastroparesis 10/6/2016   • GERD with esophagitis 10/6/2016   • H/O blood clots    • Hypertension 10/6/2016   • IBS (irritable bowel syndrome) 10/6/2016   • IHD (ischemic heart disease) 10/6/2016   • Osteoarthritis 10/6/2016   • Osteoarthritis of left hip 10/6/2016   • Osteoporosis 10/6/2016   • Parkinson's disease (HCC) 10/6/2016   • Peripheral edema 10/6/2016   • Syncope 10/6/2016     Past Surgical History:   Procedure Laterality Date   • APPENDECTOMY      As a child   • CATARACT EXTRACTION WITH INTRAOCULAR LENS IMPLANT Bilateral     2006,2008   • CORONARY ANGIOPLASTY WITH STENT PLACEMENT  2009   • CORONARY ARTERY BYPASS GRAFT  2000    x3   • GASTRECTOMY PARTIAL / TOTAL      1970s   • LAPAROSCOPIC CHOLECYSTECTOMY  2003   • SPLENECTOMY  2007   • TONSILLECTOMY AND ADENOIDECTOMY      As a child   • TOTAL ABDOMINAL HYSTERECTOMY WITH SALPINGO OOPHORECTOMY  1973     Social History     Socioeconomic History   • Marital status:    Tobacco Use   • Smoking status: Former     Packs/day: 1.00     Types: Cigarettes     Quit date: 4/28/1992     Years since  quittin.5   • Smokeless tobacco: Never   Vaping Use   • Vaping Use: Never used   Substance and Sexual Activity   • Alcohol use: No   • Drug use: No   • Sexual activity: Defer     Family History   Problem Relation Age of Onset   • Tuberculosis Mother    • No Known Problems Father        Allergies   Allergen Reactions   • Penicillins Anaphylaxis   • Reglan [Metoclopramide] Anaphylaxis   • Sulfa Antibiotics Anaphylaxis       Current Facility-Administered Medications   Medication Dose Route Frequency Provider Last Rate Last Admin   • acetaminophen (TYLENOL) tablet 500 mg  500 mg Oral Q6H PRN Althea Ivy DO       • aspirin EC tablet 81 mg  81 mg Oral Daily Althea Ivy DO   81 mg at 22 0851   • aztreonam (AZACTAM) 2 g in sodium chloride 0.9 % 100 mL IVPB-VTB  2 g Intravenous Q8H Althea Ivy DO   2 g at 22 1353   • bisacodyl (DULCOLAX) suppository 10 mg  10 mg Rectal Daily PRN Althea Ivy DO       • carbidopa-levodopa (SINEMET)  MG per tablet 1 tablet  1 tablet Oral BID Althea Ivy DO   1 tablet at 22 0851   • cetirizine (zyrTEC) tablet 10 mg  10 mg Oral Daily Althea Ivy DO   10 mg at 22 0851   • clopidogrel (PLAVIX) tablet 75 mg  75 mg Oral Daily Althea Ivy DO   75 mg at 22 0851   • divalproex (DEPAKOTE) DR tablet 250 mg  250 mg Oral Nightly Althea Ivy DO   250 mg at 22 2157   • docusate sodium (COLACE) capsule 200 mg  200 mg Oral Daily Althea Ivy DO   200 mg at 22 0851   • heparin (porcine) 5000 UNIT/ML injection 5,000 Units  5,000 Units Subcutaneous Q8H Gareth Mendez DO   5,000 Units at 22 1354   • levothyroxine (SYNTHROID, LEVOTHROID) tablet 50 mcg  50 mcg Oral Daily Althea Ivy DO   50 mcg at 22 0851   • LORazepam (ATIVAN) injection 0.25 mg  0.25 mg Intravenous Q12H PRN Althea Ivy DO   0.25 mg at 22 1049   • metroNIDAZOLE (FLAGYL) IVPB 500 mg  500 mg Intravenous Q8H Tyrone Betancourt,  "MD   500 mg at 11/14/22 0918   • multivitamin (THERAGRAN) tablet 1 tablet  1 tablet Oral Daily Althea Ivy DO   1 tablet at 11/14/22 0851   • nitroglycerin (NITROSTAT) SL tablet 0.4 mg  0.4 mg Sublingual Q5 Min PRN Tyrone Betancourt MD       • pantoprazole (PROTONIX) EC tablet 40 mg  40 mg Oral Daily Althea Ivy DO   40 mg at 11/13/22 0801   • potassium chloride ER (K-TAB) ER tablet 40 mEq  40 mEq Oral PRN Althea Ivy DO        Or   • potassium chloride (KLOR-CON) packet 40 mEq  40 mEq Oral PRN Althea Ivy DO        Or   • potassium chloride 10 mEq in 100 mL IVPB  10 mEq Intravenous Q1H PRN Althea Ivy DO       • senna (SENOKOT) tablet 1 tablet  1 tablet Oral Daily Althea Ivy DO   1 tablet at 11/14/22 0851   • senna (SENOKOT) tablet 1 tablet  1 tablet Oral Daily PRN Althea Ivy DO       • sertraline (ZOLOFT) tablet 50 mg  50 mg Oral Nightly Althea Ivy DO   50 mg at 11/13/22 2157   • sodium chloride 0.9 % flush 10 mL  10 mL Intravenous PRN Tyrone Betancourt MD       • sodium chloride 0.9 % flush 10 mL  10 mL Intravenous Q12H Tyrone Betancourt MD   10 mL at 11/14/22 0852   • sodium chloride 0.9 % flush 10 mL  10 mL Intravenous PRN Tyrone Betancourt MD       • traMADol (ULTRAM) tablet 50 mg  50 mg Oral Q12H PRN Althea Ivy DO   50 mg at 11/12/22 2018        •  acetaminophen  •  bisacodyl  •  LORazepam  •  nitroglycerin  •  potassium chloride ER **OR** potassium chloride **OR** potassium chloride  •  senna  •  [COMPLETED] Insert peripheral IV **AND** sodium chloride  •  sodium chloride  •  traMADol    Current medication reviewed for route, type, dose and frequency and are current per MAR.    Palliative Performance Scale Score:     /84   Pulse 57   Temp 98.3 °F (36.8 °C) (Oral)   Resp 18   Ht 154.9 cm (61\")   Wt 54.4 kg (119 lb 14.9 oz)   SpO2 96%   BMI 22.66 kg/m²     Intake/Output Summary (Last 24 hours) at 11/14/2022 1405  Last data filed at " "11/14/2022 1000  Gross per 24 hour   Intake 1700 ml   Output 2550 ml   Net -850 ml       PE:  General Appearance:     ill appearing, yelling in bed   HEENT:    NC/AT   Neck:   Supple, dry mucous membranes   Lungs:     CTAB without w/r/r    Heart:    RRR, normal S1 and S2, no M/R/G   Abdomen:     Soft, nondistended   Extremities:  Swelling and tenderness to the right wrist and hand, generalized pain with any movement   Pulses:   Pulses palpable and equal bilaterally   Skin:   Warm, dry   Neurologic:  Agitated, yelling \"leave me alone!\"   Psych:  Agitated       Labs:   Results from last 7 days   Lab Units 11/13/22  0805   WBC 10*3/mm3 9.94   HEMOGLOBIN g/dL 10.7*   HEMATOCRIT % 35.6   PLATELETS 10*3/mm3 248     Results from last 7 days   Lab Units 11/13/22  0805   SODIUM mmol/L 145   POTASSIUM mmol/L 4.8   CHLORIDE mmol/L 118*   CO2 mmol/L 18.2*   BUN mg/dL 16   CREATININE mg/dL 0.59   GLUCOSE mg/dL 88   CALCIUM mg/dL 8.1*     Results from last 7 days   Lab Units 11/13/22  0805 11/12/22 2053 11/12/22  0347   SODIUM mmol/L 145  --  142   POTASSIUM mmol/L 4.8   < > 3.4*   CHLORIDE mmol/L 118*  --  111*   CO2 mmol/L 18.2*  --  19.0*   BUN mg/dL 16  --  37*   CREATININE mg/dL 0.59  --  0.99   CALCIUM mg/dL 8.1*  --  8.0*   BILIRUBIN mg/dL  --   --  0.2   ALK PHOS U/L  --   --  67   ALT (SGPT) U/L  --   --  10   AST (SGOT) U/L  --   --  22   GLUCOSE mg/dL 88  --  156*    < > = values in this interval not displayed.     Imaging Results (Last 72 Hours)     Procedure Component Value Units Date/Time    XR Wrist 3+ View Left [[id number]] Collected: 11/13/22 1805     Updated: 11/13/22 1807    Narrative:      CR Wrist Min 3 Vws LT    INDICATION:   Left wrist bruising    COMPARISON:   None available.    FINDINGS:   AP, lateral, and oblique views of the left wrist.      Degenerative changes of the radiocarpal, carpal, and carpal metacarpal joints. No acute fracture or dislocation. No radiopaque foreign body.      Impression:      " Degenerative changes of the wrist and carpal joints. No acute fracture or dislocation.    Signer Name: Pedro Jerome MD   Signed: 11/13/2022 6:05 PM   Workstation Name: [id number]    [facility]    CT Upper Extremity Right Without Contrast [[id number]] Collected: 11/11/22 1944     Updated: 11/11/22 1946    Narrative:      CT UE RT WO    INDICATION:    Right wrist pain and swelling.    TECHNIQUE:   CT of the right upper extremity. without IV contrast. Coronal and sagittal reconstructions were obtained.  Radiation dose reduction techniques included automated exposure control or exposure modulation based on body size. Count of known CT and cardiac  nuc med studies performed in previous 12 months: 0.     COMPARISON:    Right wrist x-rays 11/11/2022 and 11/8/2022    FINDINGS:  Examination is markedly limited secondary to patient positioning and motion artifact. Evaluation of the right wrist is essentially nondiagnostic secondary to motion on the exam. Remainder of the visualized right upper extremity appears grossly  unremarkable without evidence of a displaced fracture or dislocation.      Impression:      Essentially nondiagnostic exam secondary to patient positioning and motion artifact. The right wrist cannot be evaluated on the exam. No other gross acute findings.    Signer Name: Sheldon Jose MD   Signed: 11/11/2022 7:44 PM   Workstation Name: [id number]    [facility]    CT Chest Without Contrast Diagnostic [[id number]] Collected: 11/11/22 1940     Updated: 11/11/22 1942    Narrative:      CT Chest WO, CT Abdomen Pelvis WO    INDICATION:   Sepsis. Altered mental status. Dementia.    TECHNIQUE:   CT of the chest, abdomen and pelvis without IV contrast. Coronal and sagittal reconstructions were obtained.  Radiation dose reduction techniques included automated exposure control or exposure modulation based on body size. Count of known CT and cardiac  nuc med studies  performed in previous 12 months: 0.     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 Extensive atherosclerotic calcification throughout the thoracic aorta. Cardiomegaly. No pericardial effusions. Prior CABG. Mild left basilar atelectasis/infiltrate. No pleural effusions or pneumothorax.    Abdomen:   The liver is within normal limits. Spleen is not well visualized. Pancreas is grossly unremarkable. Suspected cholecystectomy. Both adrenal glands are normal. Stable right renal cyst. Kidneys are otherwise grossly unremarkable allowing for lack of IV  contrast. Abdominal aorta normal in course and caliber. Small bowel is unremarkable without obstruction. Normal appendix. The colon is unremarkable. No free fluid or free air.    Pelvis:   The urinary bladder is unremarkable.  Pelvic viscera is grossly unremarkable. No free pelvic fluid.    No acute osseous abnormality.        Impression:        1. Mild left basilar atelectasis/infiltrate.  2. 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  3. Cardiomegaly.  4. No gross acute abdominal or pelvic findings.          Signer Name: Sheldon Jose MD   Signed: 11/11/2022 7:40 PM   Workstation Name: [id number]    [facility]    CT Abdomen Pelvis Without Contrast [[id number]] Collected: 11/11/22 1940     Updated: 11/11/22 1942    Narrative:      CT Chest WO, CT Abdomen Pelvis WO    INDICATION:   Sepsis. Altered mental status. Dementia.    TECHNIQUE:   CT of the chest, abdomen and pelvis without IV contrast. Coronal and sagittal reconstructions were obtained.  Radiation dose reduction techniques included automated exposure control or exposure modulation based on body size. Count of known CT and cardiac  nuc med studies performed in previous 12 months: 0.     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 Extensive atherosclerotic  calcification throughout the thoracic aorta. Cardiomegaly. No pericardial effusions. Prior CABG. Mild left basilar atelectasis/infiltrate. No pleural effusions or pneumothorax.    Abdomen:   The liver is within normal limits. Spleen is not well visualized. Pancreas is grossly unremarkable. Suspected cholecystectomy. Both adrenal glands are normal. Stable right renal cyst. Kidneys are otherwise grossly unremarkable allowing for lack of IV  contrast. Abdominal aorta normal in course and caliber. Small bowel is unremarkable without obstruction. Normal appendix. The colon is unremarkable. No free fluid or free air.    Pelvis:   The urinary bladder is unremarkable.  Pelvic viscera is grossly unremarkable. No free pelvic fluid.    No acute osseous abnormality.        Impression:        1. Mild left basilar atelectasis/infiltrate.  2. 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  3. Cardiomegaly.  4. No gross acute abdominal or pelvic findings.          Signer Name: Sheldon Jose MD   Signed: 11/11/2022 7:40 PM   [facility]: [email or web address]    [facility]    CT Head Without Contrast [[id number]] Collected: 11/11/22 1936     Updated: 11/11/22 1938    Narrative:      CT Head WO    HISTORY: AMS    COMPARISON: CT brain 6/27/2018.    TECHNIQUE: CT of the brain without IV contrast. Coronal and sagittal reconstructions were obtained.  Radiation dose reduction techniques included automated exposure control or exposure modulation based on body size. Count of known CT and cardiac nuc med  studies performed in previous 12 months: 0.     Time of Scan: 6:53 PM    FINDINGS:    No acute intracranial hemorrhage, mass effect, midline shift, or hydrocephalus.     Gray-white matter differentiation is within normal limits. Patchy hypodensities in the cerebral white matter, nonspecific but likely representing chronic microvascular ischemic changes.     Ventricles and cortical sulci are prominent, in keeping with  cerebral volume loss. Basal cisterns are clear.     Calvarium is intact. Biparietal calvarial thinning.    Visualized paranasal sinuses are clear. Visualized mastoid air cells are clear.      Impression:        1.  No acute intracranial abnormality.  2.  Chronic microvascular ischemic disease and cerebral atrophy, progressed compared to prior study 6/27/2018.    Signer Name: Pedro Jerome MD   Signed: 11/11/2022 7:36 PM   Workstation Name: [id number]    [facility]    XR Chest 1 View [[id number]] Collected: 11/11/22 1619     Updated: 11/11/22 1622    Narrative:      EXAM:    XR Chest, 1 View     EXAM DATE:    11/11/2022 3:46 PM     CLINICAL HISTORY:    AMS     TECHNIQUE:    Frontal view of the chest.     COMPARISON:    03/16/2019     FINDINGS:    Lungs:  Pulmonary vascular congestion.  Interstitial thickening.  Left  basilar airspace disease.    Pleural space:  Unremarkable.  No pneumothorax.    Heart:  Cardiomegaly.  CABG.    Mediastinum:  Unremarkable.    Bones/joints:  Stable bony structures.       Impression:      1.  CHF/edema.  2.  Mild left basilar airspace disease.     This report was finalized on 11/11/2022 4:20 PM by Dr. Edwin Mike MD.             Diagnostics: Reviewed    A: 89-year-old female with multiple medical conditions who has had a significant decline, has evidence of trauma to her right wrist and hand, and has had a significant decline.  She is confused, agitated, and appears to have pain with any movement.  Lengthy discussion with the daughter who agrees to a hospice consult.  The plan is to send her back to the skilled nursing facility when discharged.       P: Continue present management.  A hospice referral has been placed, we will continue to monitor symptoms and offer symptom management as well as support to the daughter.    We appreciate the consult and the opportunity to participate in Marilyn Sood's care. We will continue to follow along. Please do not hesitate  to contact us regarding further symptom management or goals of care needs, including after hours or on weekends via our on call provider at [phone].     Time: 50 minutes spent reviewing medical and medication records, assessing and examining patient, discussing with family, answering questions, providing some guidance about a plan and documentation of care, and coordinating care with other healthcare members, with > 50% time spent face to face.     Kristian Henley MD    2022    Electronically signed by Kristian Henley MD at 22 1413          Speech Language Pathology Notes (most recent note)      Katarzyna Hernandez MA,CCC-SLP at 22 0917          [facility] - Speech Language Pathology   Swallow Re-Assessment  Miguel Angel     Patient Name: Marilyn Sood  : 1930  MRN: [id number]  Today's Date: 2022             Admit Date: 2022    Visit Dx:     ICD-10-CM ICD-9-CM   1. Sepsis, due to unspecified organism, unspecified whether acute organ dysfunction present (HCC)  A41.9 038.9     995.91   2. Pneumonia of left lower lobe due to infectious organism  J18.9 486   3. Acute UTI  N39.0 599.0     Patient Active Problem List   Diagnosis   • IHD (ischemic heart disease)   • Hypertension   • Dyslipidemia   • Osteoarthritis   • Osteoporosis   • GERD with esophagitis   • Gastroparesis   • IBS (irritable bowel syndrome)   • Parkinson's disease (HCC)   • Colonic polyp   • Peripheral edema   • Dementia ([facility])   • Chronic hypokalemia   • Osteoarthritis of left hip   • Syncope   • Ascending aortic aneurysm   • Ischemic heart disease   • Mixed hyperlipidemia   • Hypertensive urgency   • Acute deep vein thrombosis (DVT) of distal vein of left lower extremity (HCC)   • Lower extremity edema   • Severe sepsis (HCC)     Past Medical History:   Diagnosis Date   • Arthritis    • Ascending aortic aneurysm 10/6/2016   • Chronic hypokalemia 10/6/2016   • Colonic polyp 10/6/2016   • Dementia ([facility])    •  Dyslipidemia 10/6/2016   • Gastroparesis 10/6/2016   • GERD with esophagitis 10/6/2016   • H/O blood clots    • Hypertension 10/6/2016   • IBS (irritable bowel syndrome) 10/6/2016   • IHD (ischemic heart disease) 10/6/2016   • Osteoarthritis 10/6/2016   • Osteoarthritis of left hip 10/6/2016   • Osteoporosis 10/6/2016   • Parkinson's disease (HCC) 10/6/2016   • Peripheral edema 10/6/2016   • Syncope 10/6/2016     Past Surgical History:   Procedure Laterality Date   • APPENDECTOMY      As a child   • CATARACT EXTRACTION WITH INTRAOCULAR LENS IMPLANT Bilateral     2006,2008   • CORONARY ANGIOPLASTY WITH STENT PLACEMENT  2009   • CORONARY ARTERY BYPASS GRAFT  2000    x3   • GASTRECTOMY PARTIAL / TOTAL      1970s   • LAPAROSCOPIC CHOLECYSTECTOMY  2003   • SPLENECTOMY  2007   • TONSILLECTOMY AND ADENOIDECTOMY      As a child   • TOTAL ABDOMINAL HYSTERECTOMY WITH SALPINGO OOPHORECTOMY  1973     Ms. Sood is seen at bedside this am on PCU for diet safety/assessment. She is s/p initial Clinical Dysphagia Assessment 11/12/22 post admission w/ recommendation for least restrictive modified po diet of puree consistency, thin liquids. Family did report at that time pt's premorbid baseline modified po diet of mechanical soft consistencies/purees.     Ms. Sood is resting this am, awakens to verbal/tactile stimuli and assistance to reposition upright and centered in bed. She is a/a, continues confused. She is overall non-interactive and w/ limited verbalizations today. She does imitate basic oral postures w/ a model. Oral mucosa are moist, pink and clean. Secretions are controlled.     She is positioned upright and centered in bed to accept multiple po presentations of ice chips, puree consistency, thin liquids via tsp, cup and straw. Further solids deferred 2/2 oral dysphagia, ams and fatigue effects. She does not self feed during this assessment.     Upon po presentations adequate bolus anticipation and acceptance. Bolus  formation, manipulation and control are moderately weak in general. Marked oral holding w/ ice chip trials, w/ limited rolling-like manipulation. A-p transit is mildly-moderately delayed. No overt s/s aspiration evidenced before the swallow.     Pharyngeal swallow is slightly delayed w/ adequate hyolaryngeal elevation per palpation. No overt s/s aspiration evidenced. No silent aspiration suspected.    Impression: Per this reassessment, Ms. Sood is accepting her current modified po diet w/o overt s/s aspiration. Please continue 1:1 feeding assistance. SLP to sign off at this time. This is felt to be least restrictive for her 2/2 baseline ams, fatigue effects per current medical status. SLP to sign at this time.     SLP Recommendation and Plan  1. Puree consistency, thin liquids.    2. Meds whole in puree or crushed prn.   3. Upright and centered, fully a/a for all po intake.  4. DENIS precautions.  5. Oral care protocol.  6. 1:1 assist w/ feeding.   SLP to sign off at this time.      D/w pt results and recommendations, question retention 2/2 ams.      D/w RN results and recommendations w/ verbal agreement.     Thank you for allowing me to participate in the care of your patient-  Katarzyna Hernandez M.A., CCC-SLP      EDUCATION  The patient has been educated in the following areas:   Dysphagia (Swallowing Impairment) Modified Diet Instruction.     Time Calculation:       Therapy Charges for Today     Code Description Service Date Service Provider Modifiers Qty    [id number] [facility] EVAL ORAL PHARYNG SWALLOW 4 11/14/2022 Katarzyna Hernandez MA,CCC-SLP GN 1               Katarzyna Hernandez MA,CCC-SLP  11/14/2022    Electronically signed by Katarzyna Hernandez MA,CCC-SLP at 11/14/22 1036

## 2022-11-14 NOTE — DISCHARGE PLACEMENT REQUEST
"Marilyn Sood (89 y.o. Female)     Date of Birth   04/14/1930    Social Security Number       Address   [address] [place] [address]    Home Phone   [phone]    MRN   [id number]       [employer]   None    Marital Status                               Admission Date   11/11/22    Admission Type   Emergency    Admitting Provider   Tyrone Betancourt MD    Attending Provider   Gareth Mendez DO    Department, Room/Bed   [address], 3344/1S       Discharge Date       Discharge Disposition       Discharge Destination                               Attending Provider: aGreth Mendez DO    Allergies: Penicillins, Reglan [Metoclopramide], Sulfa Antibiotics    Isolation: None   Infection: None   Code Status: No CPR    Ht: 154.9 cm (61\")   Wt: 54.4 kg (119 lb 14.9 oz)    Admission Cmt: None   Principal Problem: Severe sepsis (HCC) [A41.9,R65.20]                 Active Insurance as of 11/11/2022     Primary Coverage     Payor Plan Insurance Group Employer/Plan Group    [facility] MEDICARE REPLACEMENT [facility] MEDICARE REPLACEMENT [id number]     Payor Plan Address Payor Plan Phone Number Payor Plan Fax Number Effective Dates    [address] [address]   8/1/2021 - None Entered    [facility] [id number]       Subscriber Name Subscriber Birth Date Member ID       MARILYN SOOD 4/14/1930 [id number]           Secondary Coverage     Payor Plan Insurance Group Employer/Plan Group    [place]      Payor Plan Address Payor Plan Phone Number Payor Plan Fax Number Effective Dates    [address] [phone]  11/11/2022 - None Entered    [facility] [id number]       Subscriber Name Subscriber Birth Date Member ID       MARILYN SOOD 4/14/1930 [id number]                 Emergency Contacts      (Rel.) Home Phone Work Phone Mobile Phone    Eri Gray (Daughter) [phone] -- --    ALMITA LAMB (Grandchild) " [phone] -- --    RODNEY CROWDER (Grandchild) [phone] -- --               History & Physical      Janet Parker PA-C at 22 0919     Attestation signed by Tyrone Betancourt MD at 22 8526    I have seen and examined this patient independently from Janet Parker PA-C, and have reviewed this documentation and agree. BP already improving with continuation of maintenance IV fluids. Patient resting in bed, opening eyes to voice and speaking some but not answering questions appropriately at this time. Daughter at bedside. Discussed further regarding whether she would want a central line for vasopressor support if vasopressors became necessary, and when she learned that the central line would have to go in either her groin, neck or upper chest wall, she felt that was too invasive and declined such intervention. Will add this detail to patient's code status order. Daughter is strongly considering palliative care/hospice moving forward. Palliative care team consulted to discuss goals of care further with her. In the mean time, continue conservative management with IV antibiotics and IV fluids.                       [facility]  History & Physical    Patient Identification:  Name:  Marilyn Sood  Age:  89 y.o.  Sex:  female  :  1930  MRN:  [id number]   Visit Number:  [id number]  Primary Care Physician:  Jarod Burnett MD    Subjective     2022   Chief complaint:   Chief Complaint   Patient presents with   • Altered Mental Status     History of presenting illness:      Marilyn Sood is a 89 y.o. female with past medical history significant for ascending aortic aneurysm, coronary artery disease status post three-vessel CABG and stenting, hyperlipidemia, essential hypertension, irritable bowel syndrome, osteoarthritis, Parkinson's disease, dementia, former tobacco abuse who presents to [facility] emergency  department with complaints of altered mental status. Patient has underlying dementia and unable to provide a history. Patient's daughter present at bedside who provides majority of history. Patient's daughter reports that she had a recent injury to her right wrist and seen orthopedic surgery in the outpatient setting today who states patient has a suspected fracture and ligament tear. The mechanism of the injury is unknown. Orthopedic surgery is deciding to avoid surgery if possible and recommending to wear a brace.Patient's daughter states that since the injury patient has been unable to get out of bed. Daughter was contacted by the nursing home this evening a couple of hours after patient got back from her orthopedic surgery appointment reporting that the patient was limp and pale. The daughter states that the patient had a low-grade fever (99.8) a couple of days ago. Patient was swabbed for COVID and was negative. The daughter states that the patient did have very foul-smelling urine. She states she has not had any difficulty swallowing or choking on her medications or food, but notes that patient has had decreased oral intake since her injury. She has had no nausea or vomiting. Denies any cough or obvious increased work of breathing.     The patient has a MOST form documenting that she is DNR/DNI from the nursing home. Daughter is requesting more time to discuss with other family members to decide if she wants to proceed with vasopressors and central line if patient was to become hypotensive again.     Upon arrival to the ED, vital signs were temperature 97.7, heart rate 74, respirations 18, blood pressure 87/64, SPO2 95% on room air.  CMP with glucose 143, CO2 18.6, anion gap 16.4, creatinine 1.28, BUN 46, BUN/creatinine ratio 35.9, EGFR 39.4, albumin 3.29, otherwise unremarkable.  CBC with RDW 16, RDW-SD 54.5, otherwise unremarkable.  CRP 3.92.  Lactate 3.2.  Procalcitonin 0.09.  Troponin T negative x1.   proBNP 1369.  Pending peripheral blood cultures x2.  COVID-19 and influenza A/B swab negative.  Urinalysis with trace ketones, trace blood, 1+ leukocytes, 35 RBC, 6-12 WBC, 1+ bacteria.  Pending urine culture.  Chest x-ray with CHF/edema, mild left basilar airspace disease.  CT chest and abdomen/pelvis with mild left basilar atelectasis/infiltrate, 4 cm ectasia of ascending thoracic aorta, cardiomegaly.  CT head without acute intracranial abnormality, chronic microvascular ischemic disease and cerebral atrophy.  CT right upper extremity essentially nondiagnostic secondary to patient positioning and motion artifact, right wrist cannot be evaluated on exam otherwise no acute findings.    Known Emergency Department medications received prior to my evaluation included IV Azactam, IV Ativan 0.25 mg, IV Zofran 4 mg, 2.497 mL fluid bolus, IV vancomycin.    Patient will be admitted to the PCU for further evaluation and monitoring.     ---------------------------------------------------------------------------------------------------------------------   Review of Systems   Unable to perform ROS: Dementia (History obtained from Daughter (Eri) at bedside)   Constitutional: Positive for activity change (Decreased since recent wrist injury), appetite change (Decreased) and fever (T-max 99.8).   Respiratory: Negative for choking and shortness of breath.    Gastrointestinal: Negative for nausea and vomiting.   Genitourinary:        Reports foul-smelling urine   Musculoskeletal:        Swelling of right upper extremity        ---------------------------------------------------------------------------------------------------------------------   Past Medical History:   Diagnosis Date   • Arthritis    • Ascending aortic aneurysm 10/6/2016   • Chronic hypokalemia 10/6/2016   • Colonic polyp 10/6/2016   • Dementia (HCC)    • Dyslipidemia 10/6/2016   • Gastroparesis 10/6/2016   • GERD with esophagitis 10/6/2016   • H/O blood clots    •  Hypertension 10/6/2016   • IBS (irritable bowel syndrome) 10/6/2016   • IHD (ischemic heart disease) 10/6/2016   • Osteoarthritis 10/6/2016   • Osteoarthritis of left hip 10/6/2016   • Osteoporosis 10/6/2016   • Parkinson's disease (HCC) 10/6/2016   • Peripheral edema 10/6/2016   • Syncope 10/6/2016     Past Surgical History:   Procedure Laterality Date   • APPENDECTOMY      As a child   • CATARACT EXTRACTION WITH INTRAOCULAR LENS IMPLANT Bilateral     ,   • CORONARY ANGIOPLASTY WITH STENT PLACEMENT     • CORONARY ARTERY BYPASS GRAFT  2000    x3   • GASTRECTOMY PARTIAL / TOTAL      1970s   • LAPAROSCOPIC CHOLECYSTECTOMY     • SPLENECTOMY     • TONSILLECTOMY AND ADENOIDECTOMY      As a child   • TOTAL ABDOMINAL HYSTERECTOMY WITH SALPINGO OOPHORECTOMY  1973     Family History   Problem Relation Age of Onset   • Tuberculosis Mother    • No Known Problems Father      Social History     Socioeconomic History   • Marital status:    Tobacco Use   • Smoking status: Former     Packs/day: 1.00     Types: Cigarettes     Quit date: 1992     Years since quittin.5   • Smokeless tobacco: Never   Vaping Use   • Vaping Use: Never used   Substance and Sexual Activity   • Alcohol use: No   • Drug use: No   • Sexual activity: Defer     ---------------------------------------------------------------------------------------------------------------------   Allergies:  Penicillins, Reglan [metoclopramide], and Sulfa antibiotics  ---------------------------------------------------------------------------------------------------------------------   Home medications:    Medications below are reported home medications pulling from within the system; at this time, these medications have not been reconciled unless otherwise specified and are in the verification process for further verifcation as current home medications.  (Not in a hospital admission)      Hospital Scheduled Meds:          Current listed  hospital scheduled medications may not yet reflect those currently placed in orders that are signed and held awaiting patient's arrival to floor.   ---------------------------------------------------------------------------------------------------------------------     Objective     Vital Signs:  Temp:  [97.7 °F (36.5 °C)] 97.7 °F (36.5 °C)  Heart Rate:  [70-80] 80  Resp:  [18] 18  BP: ()/(57-73) 113/73      11/11/22  1539   Weight: 49.9 kg (110 lb)     Body mass index is 20.78 kg/m².  ---------------------------------------------------------------------------------------------------------------------       Physical Exam  Constitutional:       General: She is awake.      Appearance: She is normal weight.      Comments: Patient resting in bed.  Becomes agitated when performing exam. Appears in no acute distress.    HENT:      Head: Normocephalic and atraumatic.      Right Ear: External ear normal.      Left Ear: External ear normal.      Nose: Nose normal.      Mouth/Throat:      Mouth: Mucous membranes are dry.      Pharynx: Oropharynx is clear.   Eyes:      Extraocular Movements: Extraocular movements intact.      Conjunctiva/sclera: Conjunctivae normal.      Pupils: Pupils are equal, round, and reactive to light.   Cardiovascular:      Rate and Rhythm: Normal rate and regular rhythm.      Pulses: Normal pulses.           Radial pulses are 2+ on the right side and 2+ on the left side.        Dorsalis pedis pulses are 2+ on the right side and 2+ on the left side.        Posterior tibial pulses are 2+ on the right side and 2+ on the left side.      Heart sounds: Normal heart sounds. No murmur heard.    No friction rub. No gallop.   Pulmonary:      Effort: Pulmonary effort is normal. No accessory muscle usage or respiratory distress.      Breath sounds: Normal breath sounds. No decreased breath sounds, wheezing, rhonchi or rales.   Abdominal:      General: Abdomen is flat. Bowel sounds are normal. There is no  distension.      Palpations: Abdomen is soft.      Tenderness: There is no abdominal tenderness. There is no guarding.   Musculoskeletal:         General: Swelling present.      Cervical back: Normal range of motion and neck supple.      Right lower leg: No edema.      Left lower leg: No edema.      Comments: Right wrist noted to be edematous. Brace in place.    Skin:     General: Skin is warm and dry.      Findings: No erythema or rash.   Neurological:      Mental Status: She is alert.      Comments: Difficult to exam as patient not answering questions or participating in exam. Does have dementia at baseline   Psychiatric:         Behavior: Behavior is uncooperative and agitated.      Comments: Becomes agitated when attempting to examine.          ---------------------------------------------------------------------------------------------------------------------  EKG:     Pending cardiology interpretation.  Per my review, EKG shows normal sinus rhythm with heart rate 72 and  ms without acute ischemic changes.    I have personally looked at both the EKG and the telemetry strips.  ---------------------------------------------------------------------------------------------------------------------   Results from last 7 days   Lab Units 11/11/22  1938 11/11/22  1646 11/08/22  2111   CRP mg/dL  --  3.92*  --    LACTATE mmol/L 1.1 3.2*  --    WBC 10*3/mm3  --  7.76 11.33*   HEMOGLOBIN g/dL  --  12.0 11.4*   HEMATOCRIT %  --  37.6 36.2   MCV fL  --  92.2 94.0   MCHC g/dL  --  31.9 31.5   PLATELETS 10*3/mm3  --  309 276         Results from last 7 days   Lab Units 11/11/22  1646 11/08/22  2111   SODIUM mmol/L 140 136   POTASSIUM mmol/L 3.7 4.4   CHLORIDE mmol/L 105 101   CO2 mmol/L 18.6* 22.6   BUN mg/dL 46* 25*   CREATININE mg/dL 1.28* 1.05*   CALCIUM mg/dL 9.3 8.9   GLUCOSE mg/dL 143* 111*   ALBUMIN g/dL 3.29* 3.26*   BILIRUBIN mg/dL 0.2 0.4   ALK PHOS U/L 88 73   AST (SGOT) U/L 24 16   ALT (SGPT) U/L 10 <5    Estimated Creatinine Clearance: 22.1 mL/min (A) (by C-G formula based on SCr of 1.28 mg/dL (H)).  No results found for: AMMONIA  Results from last 7 days   Lab Units 11/11/22  1938 11/11/22  1646 11/08/22  2111   CK TOTAL U/L  --   --  48   TROPONIN T ng/mL <0.010 <0.010  --      Results from last 7 days   Lab Units 11/11/22  1646   PROBNP pg/mL 1,369.0     Lab Results   Component Value Date    HGBA1C 6.6 (H) 08/06/2015     Lab Results   Component Value Date    TSH 4.850 (H) 11/11/2022    FREET4 0.72 (L) 11/11/2022     No results found for: PREGTESTUR, PREGSERUM, HCG, HCGQUANT  Pain Management Panel     Pain Management Panel Latest Ref Rng & Units 8/5/2015    AMPHETAMINES SCREEN, URINE NEGATIVE Negative    BARBITURATES SCREEN NEGATIVE Negative    BENZODIAZEPINE SCREEN, URINE NEGATIVE Negative    COCAINE SCREEN, URINE NEGATIVE Negative    METHADONE SCREEN, URINE NEGATIVE Negative            ---------------------------------------------------------------------------------------------------------------------  Imaging Results (Last 7 Days)     Procedure Component Value Units Date/Time    CT Upper Extremity Right Without Contrast [[id number]] Collected: 11/11/22 1944     Updated: 11/11/22 1946    Narrative:      CT UE RT WO    INDICATION:    Right wrist pain and swelling.    TECHNIQUE:   CT of the right upper extremity. without IV contrast. Coronal and sagittal reconstructions were obtained.  Radiation dose reduction techniques included automated exposure control or exposure modulation based on body size. Count of known CT and cardiac  nuc med studies performed in previous 12 months: 0.     COMPARISON:    Right wrist x-rays 11/11/2022 and 11/8/2022    FINDINGS:  Examination is markedly limited secondary to patient positioning and motion artifact. Evaluation of the right wrist is essentially nondiagnostic secondary to motion on the exam. Remainder of the visualized right upper extremity appears grossly  unremarkable  without evidence of a displaced fracture or dislocation.      Impression:      Essentially nondiagnostic exam secondary to patient positioning and motion artifact. The right wrist cannot be evaluated on the exam. No other gross acute findings.    Signer Name: Sheldon Jose MD   Signed: 11/11/2022 7:44 PM   Workstation Name: [email or web address]    [facility]    CT Chest Without Contrast Diagnostic [[id number]] Collected: 11/11/22 1940     Updated: 11/11/22 1942    Narrative:      CT Chest WO, CT Abdomen Pelvis WO    INDICATION:   Sepsis. Altered mental status. Dementia.    TECHNIQUE:   CT of the chest, abdomen and pelvis without IV contrast. Coronal and sagittal reconstructions were obtained.  Radiation dose reduction techniques included automated exposure control or exposure modulation based on body size. Count of known CT and cardiac  nuc med studies performed in previous 12 months: 0.     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 Extensive atherosclerotic calcification throughout the thoracic aorta. Cardiomegaly. No pericardial effusions. Prior CABG. Mild left basilar atelectasis/infiltrate. No pleural effusions or pneumothorax.    Abdomen:   The liver is within normal limits. Spleen is not well visualized. Pancreas is grossly unremarkable. Suspected cholecystectomy. Both adrenal glands are normal. Stable right renal cyst. Kidneys are otherwise grossly unremarkable allowing for lack of IV  contrast. Abdominal aorta normal in course and caliber. Small bowel is unremarkable without obstruction. Normal appendix. The colon is unremarkable. No free fluid or free air.    Pelvis:   The urinary bladder is unremarkable.  Pelvic viscera is grossly unremarkable. No free pelvic fluid.    No acute osseous abnormality.        Impression:        1. Mild left basilar atelectasis/infiltrate.  2. 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  3.  Cardiomegaly.  4. No gross acute abdominal or pelvic findings.          Signer Name: Sheldon Jose MD   Signed: 11/11/2022 7:40 PM   Workstation Name: [email or web address]    [facility]    CT Abdomen Pelvis Without Contrast [[id number]] Collected: 11/11/22 1940     Updated: 11/11/22 1942    Narrative:      CT Chest WO, CT Abdomen Pelvis WO    INDICATION:   Sepsis. Altered mental status. Dementia.    TECHNIQUE:   CT of the chest, abdomen and pelvis without IV contrast. Coronal and sagittal reconstructions were obtained.  Radiation dose reduction techniques included automated exposure control or exposure modulation based on body size. Count of known CT and cardiac  nuc med studies performed in previous 12 months: 0.     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 Extensive atherosclerotic calcification throughout the thoracic aorta. Cardiomegaly. No pericardial effusions. Prior CABG. Mild left basilar atelectasis/infiltrate. No pleural effusions or pneumothorax.    Abdomen:   The liver is within normal limits. Spleen is not well visualized. Pancreas is grossly unremarkable. Suspected cholecystectomy. Both adrenal glands are normal. Stable right renal cyst. Kidneys are otherwise grossly unremarkable allowing for lack of IV  contrast. Abdominal aorta normal in course and caliber. Small bowel is unremarkable without obstruction. Normal appendix. The colon is unremarkable. No free fluid or free air.    Pelvis:   The urinary bladder is unremarkable.  Pelvic viscera is grossly unremarkable. No free pelvic fluid.    No acute osseous abnormality.        Impression:        1. Mild left basilar atelectasis/infiltrate.  2. 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  3. Cardiomegaly.  4. No gross acute abdominal or pelvic findings.          Signer Name: Sheldon Jose MD   Signed: 11/11/2022 7:40 PM   Workstation Name: [email or web address]    [facility]    CT Head Without Contrast [[id number]] Collected: 11/11/22 1936     Updated: 11/11/22 1938    Narrative:      CT Head WO    HISTORY: AMS    COMPARISON: CT brain 6/27/2018.    TECHNIQUE: CT of the brain without IV contrast. Coronal and sagittal reconstructions were obtained.  Radiation dose reduction techniques included automated exposure control or exposure modulation based on body size. Count of known CT and cardiac nuc med  studies performed in previous 12 months: 0.     Time of Scan: 6:53 PM    FINDINGS:    No acute intracranial hemorrhage, mass effect, midline shift, or hydrocephalus.     Gray-white matter differentiation is within normal limits. Patchy hypodensities in the cerebral white matter, nonspecific but likely representing chronic microvascular ischemic changes.     Ventricles and cortical sulci are prominent, in keeping with cerebral volume loss. Basal cisterns are clear.     Calvarium is intact. Biparietal calvarial thinning.    Visualized paranasal sinuses are clear. Visualized mastoid air cells are clear.      Impression:        1.  No acute intracranial abnormality.  2.  Chronic microvascular ischemic disease and cerebral atrophy, progressed compared to prior study 6/27/2018.    Signer Name: Pedro Jerome MD   Signed: 11/11/2022 7:36 PM   Workstation Name: [id number]    [facility]    XR Chest 1 View [[id number]] Collected: 11/11/22 1619     Updated: 11/11/22 1622    Narrative:      EXAM:    XR Chest, 1 View     EXAM DATE:    11/11/2022 3:46 PM     CLINICAL HISTORY:    AMS     TECHNIQUE:    Frontal view of the chest.     COMPARISON:    03/16/2019     FINDINGS:    Lungs:  Pulmonary vascular congestion.  Interstitial thickening.  Left  basilar airspace disease.    Pleural space:  Unremarkable.  No pneumothorax.    Heart:  Cardiomegaly.  CABG.    Mediastinum:  Unremarkable.    Bones/joints:  Stable bony structures.       Impression:      1.  CHF/edema.  2.   Mild left basilar airspace disease.     This report was finalized on 11/11/2022 4:20 PM by Dr. Edwin Mike MD.           I have personally reviewed the above radiology images and read the final radiology report on 11/11/22  ---------------------------------------------------------------------------------------------------------------------  Assessment / Plan     Active Hospital Problems    Diagnosis  POA   • **Severe sepsis (HCC) [A41.9, R65.20]  Yes       ASSESSMENT/PLAN:  -Possible left lower lobe pneumonia, HCAP vs aspiration, POA  -Acute urinary tract infection, POA  -Severe sepsis criteria secondary to above with C-RP >2, lactate 3.2, AMS, hypotension responding to fluid rescuscitation, POA  -Anion-gap metabolic acidosis likely due to above, POA  -UA with trace blood, 1+ leukocytes, 3-5 RBC, 6-12 WBC, and 1+ bacteria.  -Urine sample sent for culture, follow-up on results.   -CT chest with mild left basilar atelectasis/infiltrate.   -Procalcitonin normal.   -Received IV Vancomycin and Azactam in ED; will continue on admission and will add IV Flagyl to cover for possible aspiration.  -Received sepsis bolus in ED (1497 mL); continuous IV fluids ordered.  -Obtain MRSA nasal swab.   -Repeat a.m. CBC, CMP, and C-RP. Trend lactate until normalized.   -SLP consulted to evaluate for dysphagia/silent aspiration.   -Aspiration precautions in place.    -Altered mental status, suspect metabolic encephalopathy 2/2 above, POA  -Parkinson's disease  -Dementia  -Debility  -CT head without acute abnormality.   -Continue treatment as outlined above and monitor for improvement.   -Up with assistance; fall precautions.   -Aspiration precautions in place.   -Palliative care consulted for C discussions.    -Recent right wrist injury, mechanism of injury unknown  -Appointment with orthopedic surgery 11/12 and per daughter's report patient has possible wrist fracture and ligament tear, no surgery recommended at this time, and  continue use of wrist brace.  -CT RUE nondiagnostic due to patient position and motion artifact.   -Continue outpatient follow-up with orthopedic surgery.     -Ascending aortic aneurysm, 4 cm  -CT abdomen/pelvis with stable appearance of ascending aortic aneurysm compared to imaging from 08/2020.  -Continue close follow-up and monitoring in the outpatient setting.     -CAD s/p 3 vessel CABG and stenting  -Troponin T negative x 2.   -EKG without acute ischemic changes.   -Review home medications once reconciled.     -Essential hypertension, currently with hypotension  -Hyperlipidemia  -Currently with hypotension (BP 87/54); daughter requesting more time to discuss with family about initiating vasopressors; will start continuous IV fluids in the meantime while awaiting family's decision.   -Monitor per floor protocol.   -Hold home anti-hypertensives until BP stabilizes.     -Hypothyroidism  -TSH 4.850. Free T4 0.72.   -May need dose adjustment of Synthroid once reconciled and repeat thyroid function tests in the outpatient setting.     -Osteoarthritis  -Supportive care.   -Review home medications once reconciled.    -Irritable bowel syndrome  -Review home medications once reconciled.   -------------------------------  F/E/N: Continuous IV fluids. Replace electrolytes as needed. NPO diet.   DVT prophylaxis: SQ Heparin  Activity: Up with assistance, fall precautions  -------------------------------  CODE STATUS: DNR/DNI; DaughterEri, requesting more time to discuss with other family members if want to proceed with vasopressors.    High risk secondary to severe sepsis, acute UTI, possible pneumonia of left lower lobe, metabolic encephalopathy in setting of dementia, recent right wrist injury  -------------------------------  Expected length of stay:  INPATIENT status due to the need for care which can only be reasonably provided in an hospital setting such as aggressive/expedited ancillary services and/or  consultation services, the necessity for IV medications, close physician monitoring and/or the possible need for procedures.  In such, I feel patient's risk for adverse outcomes and need for care warrant INPATIENT evaluation and predict the patient's care encounter to likely last beyond 2 midnights.     Disposition: Pending clinical course. Likely back to nursing home once medically stable.    Janet Parker PA-C  11/11/22  22:52 EST    ---------------------------------------------------------------------------------------------------------------------           Electronically signed by Tyrone Betancourt MD at 11/12/22 0143       Vital Signs (last day)     Date/Time Temp Temp src Pulse Resp BP Patient Position SpO2    11/14/22 1652 97.4 (36.3) Axillary 62 14 160/83 Lying 95    11/14/22 1600 97.9 (36.6) Oral -- -- -- -- --    11/14/22 1200 98.3 (36.8) Oral 55 -- 143/81 -- 97    11/14/22 0800 97.9 (36.6) Oral 57 -- 146/84 -- 96    11/14/22 0700 -- -- 52 -- 137/71 -- 96    11/14/22 0600 -- -- 54 -- 134/75 -- 98    11/14/22 0500 -- -- 56 -- 128/69 -- 95    11/14/22 0400 98.1 (36.7) Axillary 51 -- 130/67 -- 97    11/14/22 0300 -- -- 54 -- 142/76 -- 96    11/14/22 0200 -- -- 54 -- 108/60 -- 95    11/14/22 0100 -- -- 56 -- 121/66 -- 96    11/14/22 0000 98.3 (36.8) Axillary 53 -- 143/87 -- 97    11/13/22 2300 -- -- 56 -- 143/79 -- 99    11/13/22 2100 -- -- 54 -- 113/60 -- 96    11/13/22 2000 97.8 (36.6) Axillary 56 -- 114/62 -- 98    11/13/22 1900 -- -- 55 -- 128/84 -- 97    11/13/22 1801 -- -- 60 -- 158/95 -- 97    11/13/22 1700 -- -- 57 -- 159/94 -- 99    11/13/22 1600 97.1 (36.2) Axillary 54 18 141/90 -- 96    11/13/22 1500 -- -- 56 -- 148/81 -- 98    11/13/22 1400 -- -- 51 -- 147/79 -- 97    11/13/22 1300 -- -- -- -- 146/76 -- 98    11/13/22 1200 97.7 (36.5) Oral 55 16 140/85 -- 95    11/13/22 1100 -- -- 61 -- 154/88 -- 98    11/13/22 1000 -- -- 58 -- 135/82 -- 94    11/13/22 0900 -- -- 59 -- 132/102 -- 95     11/13/22 0800 98.6 (37) Axillary 67 -- 128/106 -- 95    11/13/22 0602 -- -- 62 -- 148/78 -- 95    11/13/22 0502 -- -- 64 -- 146/83 -- 94    11/13/22 0402 97.8 (36.6) Axillary 65 -- 149/86 -- 96    11/13/22 0302 -- -- 64 -- 137/79 -- --    11/13/22 0202 -- -- 70 -- 134/94 -- --    11/13/22 0102 -- -- 64 -- 143/79 -- --    11/13/22 0002 -- -- 66 -- 133/72 -- 95    11/13/22 0000 97.9 (36.6) Axillary -- -- -- -- --          Lines, Drains & Airways     Active LDAs     Name Placement date Placement time Site Days    Peripheral IV 11/13/22 0245 Anterior;Left Forearm 11/13/22  0245  Forearm  1    External Urinary Catheter 11/12/22  --  --  2                  Current Facility-Administered Medications   Medication Dose Route Frequency Provider Last Rate Last Admin   • acetaminophen (TYLENOL) tablet 500 mg  500 mg Oral Q6H PRN Gareth Mendez DO       • aspirin EC tablet 81 mg  81 mg Oral Daily Gareth Mendez DO   81 mg at 11/14/22 0851   • aztreonam (AZACTAM) 2 g in sodium chloride 0.9 % 100 mL IVPB-VTB  2 g Intravenous Q8H Gareth Mendez DO   2 g at 11/14/22 1353   • bisacodyl (DULCOLAX) suppository 10 mg  10 mg Rectal Daily PRN Gareth Mendez DO       • carbidopa-levodopa (SINEMET)  MG per tablet 1 tablet  1 tablet Oral BID Gareth Mendez DO   1 tablet at 11/14/22 0851   • cetirizine (zyrTEC) tablet 10 mg  10 mg Oral Daily Gareth Mendez DO   10 mg at 11/14/22 0851   • clonazePAM (KlonoPIN) tablet 0.25 mg  0.25 mg Oral BID PRN Gareth Mendez DO       • clopidogrel (PLAVIX) tablet 75 mg  75 mg Oral Daily Gareth Mendez, DO   75 mg at 11/14/22 0851   • divalproex (DEPAKOTE) DR tablet 250 mg  250 mg Oral Nightly Gareth Mendez DO   250 mg at 11/13/22 2157   • docusate sodium (COLACE) capsule 200 mg  200 mg Oral Daily Gareth Mendez DO   200 mg at 11/14/22 0851   • Enoxaparin Sodium (LOVENOX) syringe 40 mg  40 mg  Subcutaneous Nightly Gareth Mendez, DO       • levothyroxine (SYNTHROID, LEVOTHROID) tablet 50 mcg  50 mcg Oral Daily Gareth Mendez DO   50 mcg at 11/14/22 0851   • metroNIDAZOLE (FLAGYL) IVPB 500 mg  500 mg Intravenous Q8H Gareth Mendez DO   500 mg at 11/14/22 0918   • multivitamin (THERAGRAN) tablet 1 tablet  1 tablet Oral Daily Gareth Mendez DO   1 tablet at 11/14/22 0851   • nitroglycerin (NITROSTAT) SL tablet 0.4 mg  0.4 mg Sublingual Q5 Min PRN Gareth Mendez DO       • pantoprazole (PROTONIX) EC tablet 40 mg  40 mg Oral Daily Gareth Mendez DO   40 mg at 11/13/22 0801   • potassium chloride ER (K-TAB) ER tablet 40 mEq  40 mEq Oral PRN Gareth Mendez DO        Or   • potassium chloride (KLOR-CON) packet 40 mEq  40 mEq Oral PRN Gareth Mendez DO        Or   • potassium chloride 10 mEq in 100 mL IVPB  10 mEq Intravenous Q1H PRN Gareth Mendez DO       • QUEtiapine (SEROquel) tablet 12.5 mg  12.5 mg Oral Nightly Gareth Mendez DO       • senna (SENOKOT) tablet 1 tablet  1 tablet Oral Daily Gareth Mendez DO   1 tablet at 11/14/22 0851   • senna (SENOKOT) tablet 1 tablet  1 tablet Oral Daily PRN Gareth Mendez DO       • sertraline (ZOLOFT) tablet 50 mg  50 mg Oral Nightly Gareth Mendez DO   50 mg at 11/13/22 2157   • sodium chloride 0.9 % flush 10 mL  10 mL Intravenous PRN Gareth eMndez DO       • sodium chloride 0.9 % flush 10 mL  10 mL Intravenous Q12H Gareth Mendez DO   10 mL at 11/14/22 0852   • sodium chloride 0.9 % flush 10 mL  10 mL Intravenous PRN Gareth Mendez DO       • traMADol (ULTRAM) tablet 50 mg  50 mg Oral Q12H PRN Gareth Mendez DO   50 mg at 11/12/22 2018     Operative/Procedure Notes (most recent note)    No notes of this type exist for this encounter.            Physician Progress Notes (most recent  note)      Gareth Mendez DO at 22 1452              [facility] HOSPITALIST PROGRESS NOTE     Patient Identification:  Name:  Marilyn Sood  Age:  89 y.o.  Sex:  female  :  1930  MRN:  [id number]  Visit Number:  [id number]  ROOM: [address]     Primary Care Provider:  Jarod Burnett MD    Length of stay in inpatient status:  3    Subjective     Chief Compliant:    Chief Complaint   Patient presents with   • Altered Mental Status       History of Presenting Illness:   Patient seen in follow-up for encephalopathy, pneumonia.  Patient is awake, alert to self but not place or time.  She is able to recognize her daughter who is present at the bedside.  Yesterday evening patient became agitated, removing telemetry leads, etc. requiring low-dose IV Ativan.  She rested well overnight after receiving the Ativan without further issues.  No other adverse events noted.    Objective     Current Hospital Meds:aspirin, 81 mg, Oral, Daily  aztreonam, 2 g, Intravenous, Q8H  carbidopa-levodopa, 1 tablet, Oral, BID  cetirizine, 10 mg, Oral, Daily  clopidogrel, 75 mg, Oral, Daily  divalproex, 250 mg, Oral, Nightly  docusate sodium, 200 mg, Oral, Daily  heparin (porcine), 5,000 Units, Subcutaneous, Q8H  levothyroxine, 50 mcg, Oral, Daily  metroNIDAZOLE, 500 mg, Intravenous, Q8H  multivitamin, 1 tablet, Oral, Daily  pantoprazole, 40 mg, Oral, Daily  senna, 1 tablet, Oral, Daily  sertraline, 50 mg, Oral, Nightly  sodium chloride, 10 mL, Intravenous, Q12H         Current Antimicrobial Therapy:  Anti-Infectives (From admission, onward)    Ordered     Dose/Rate Route Frequency Start Stop    22 1259  aztreonam (AZACTAM) 2 g in sodium chloride 0.9 % 100 mL IVPB-VTB        Ordering Provider: Althea Ivy DO    2 g  over 4 Hours Intravenous Every 8 Hours 22 1422 22 0629    22 2323  metroNIDAZOLE (FLAGYL) IVPB 500 mg        Ordering Provider: Tyrone Betancourt MD     500 mg  over 30 Minutes Intravenous Every 8 Hours 11/12/22 0100 11/17/22 0059    11/11/22 1731  aztreonam (AZACTAM) 2 g in sodium chloride 0.9 % 100 mL IVPB-VTB        Ordering Provider: Ivy Villalpando PA    2 g  200 mL/hr over 30 Minutes Intravenous Once 11/11/22 1733 11/11/22 1827    11/11/22 1731  vancomycin (VANCOCIN) 1,000 mg in sodium chloride 0.9 % 250 mL IVPB        Ordering Provider: Ivy Villalpando PA    20 mg/kg × 49.9 kg Intravenous Once 11/11/22 1733 11/11/22 2132        Current Diuretic Therapy:  Diuretics (From admission, onward)    None        ----------------------------------------------------------------------------------------------------------------------  Vital Signs:  Temp:  [97.1 °F (36.2 °C)-98.3 °F (36.8 °C)] 98.3 °F (36.8 °C)  Heart Rate:  [51-60] 57  Resp:  [18] 18  BP: (108-159)/(60-95) 146/84  SpO2:  [95 %-99 %] 96 %  on   ;   Device (Oxygen Therapy): room air  Body mass index is 22.66 kg/m².    Wt Readings from Last 3 Encounters:   11/14/22 54.4 kg (119 lb 14.9 oz)   11/11/22 48.1 kg (106 lb)   11/08/22 48.1 kg (106 lb)     Intake & Output (last 3 days)       11/11 0701 11/12 0700 11/12 0701 11/13 0700 11/13 0701  11/14 0700 11/14 0701  11/15 0700    P.O. 0 120 0 0    I.V. (mL/kg) 1979.9 (38.9) 3000 (55.1) 1400 (25.7)     IV Piggyback 1847 550 300     Total Intake(mL/kg) 3826.9 (75.2) 3670 (67.5) 1700 (31.3) 0 (0)    Urine (mL/kg/hr)  600 (0.5) 1850 (1.4) 1200 (2.8)    Total Output  600 1850 1200    Net +3826.9 +3070 -150 -1200            Urine Unmeasured Occurrence 1 x 1 x 2 x         Diet Pureed; Thin  ----------------------------------------------------------------------------------------------------------------------  Physical exam:  Constitutional: Elderly, demented female, resting comfortably in bed, no acute distress.      HENT:  Head:  Normocephalic and atraumatic.  Mouth:  Moist mucous membranes.    Eyes:  Conjunctivae and EOM are normal. No scleral icterus.    Cardiovascular:  Normal rate, regular rhythm and normal heart sounds with no murmur. No JVD.   Pulmonary/Chest:  No respiratory distress, no wheezes, no crackles, with normal breath sounds and good air movement. Unlabored. No accessory muscle use.  Abdominal:  Soft. No distension and no tenderness.  Bowel sounds present. No rebound or guarding.   Musculoskeletal:  No tenderness, and no deformity.  No red or swollen joints anywhere.    Neurological:  Alert and oriented to person only.  No cranial nerve deficit.   Nonfocal.   Skin:  Skin is warm and dry. No rash noted. No pallor.   Peripheral vascular:  No clubbing, no cyanosis, no edema. Pedal and tibial pulses 2 out of 4 bilaterally.     ----------------------------------------------------------------------------------------------------------------------  Results from last 7 days   Lab Units 11/13/22  0805 11/12/22 0347 11/11/22 1938 11/11/22 1646   CRP mg/dL  --  2.85*  --  3.92*   LACTATE mmol/L  --   --  1.1 3.2*   WBC 10*3/mm3 9.94 13.65*  --  7.76   HEMOGLOBIN g/dL 10.7* 10.4*  --  12.0   HEMATOCRIT % 35.6 32.7*  --  37.6   MCV fL 99.7* 93.2  --  92.2   MCHC g/dL 30.1* 31.8  --  31.9   PLATELETS 10*3/mm3 248 248  --  309         Results from last 7 days   Lab Units 11/13/22  0805 11/12/22 2053 11/12/22 0347 11/11/22  1646 11/08/22  2111   SODIUM mmol/L 145  --  142 140 136   POTASSIUM mmol/L 4.8 4.5 3.4* 3.7 4.4   CHLORIDE mmol/L 118*  --  111* 105 101   CO2 mmol/L 18.2*  --  19.0* 18.6* 22.6   BUN mg/dL 16  --  37* 46* 25*   CREATININE mg/dL 0.59  --  0.99 1.28* 1.05*   CALCIUM mg/dL 8.1*  --  8.0* 9.3 8.9   GLUCOSE mg/dL 88  --  156* 143* 111*   ALBUMIN g/dL  --   --  2.52* 3.29* 3.26*   BILIRUBIN mg/dL  --   --  0.2 0.2 0.4   ALK PHOS U/L  --   --  67 88 73   AST (SGOT) U/L  --   --  22 24 16   ALT (SGPT) U/L  --   --  10 10 <5   Estimated Creatinine Clearance: 52.2 mL/min (by C-G formula based on SCr of 0.59 mg/dL).  No results found for:  AMMONIA  Results from last 7 days   Lab Units 11/11/22  1938 11/11/22  1646 11/08/22  2111   CK TOTAL U/L  --   --  48   TROPONIN T ng/mL <0.010 <0.010  --      Results from last 7 days   Lab Units 11/11/22  1646   PROBNP pg/mL 1,369.0         No results found for: HGBA1C, POCGLU  Lab Results   Component Value Date    TSH 4.850 (H) 11/11/2022    FREET4 0.72 (L) 11/11/2022     No results found for: PREGTESTUR, PREGSERUM, HCG, HCGQUANT  Pain Management Panel     Pain Management Panel Latest Ref Rng & Units 8/5/2015    AMPHETAMINES SCREEN, URINE NEGATIVE Negative    BARBITURATES SCREEN NEGATIVE Negative    BENZODIAZEPINE SCREEN, URINE NEGATIVE Negative    COCAINE SCREEN, URINE NEGATIVE Negative    METHADONE SCREEN, URINE NEGATIVE Negative        Brief Urine Lab Results  (Last result in the past 365 days)      Color   Clarity   Blood   Leuk Est   Nitrite   Protein   CREAT   Urine HCG        11/11/22 1951 Dark Yellow   Clear   Trace   Small (1+)   Negative   Negative               Blood Culture   Date Value Ref Range Status   11/11/2022 No growth at 2 days  Preliminary   11/11/2022 No growth at 2 days  Preliminary     Urine Culture   Date Value Ref Range Status   11/11/2022 <10,000 CFU/mL Normal Urogenital Amira  Final     No results found for: WOUNDCX  No results found for: STOOLCX  No results found for: RESPCX  No results found for: AFBCX  Results from last 7 days   Lab Units 11/12/22  0347 11/11/22 1938 11/11/22  1646   PROCALCITONIN ng/mL  --   --  0.09   LACTATE mmol/L  --  1.1 3.2*   CRP mg/dL 2.85*  --  3.92*       I have personally looked at the labs and they are summarized above.  ----------------------------------------------------------------------------------------------------------------------  Detailed radiology reports for the last 24 hours:  Imaging Results (Last 24 Hours)     Procedure Component Value Units Date/Time    XR Wrist 3+ View Left [[id number]] Collected: 11/13/22 1805     Updated: 11/13/22  1807    Narrative:      CR Wrist Min 3 Vws LT    INDICATION:   Left wrist bruising    COMPARISON:   None available.    FINDINGS:   AP, lateral, and oblique views of the left wrist.      Degenerative changes of the radiocarpal, carpal, and carpal metacarpal joints. No acute fracture or dislocation. No radiopaque foreign body.      Impression:      Degenerative changes of the wrist and carpal joints. No acute fracture or dislocation.    Signer Name: Pedro Jerome MD   Signed: 11/13/2022 6:05 PM   Workstation Name: [id number]    [facility]        Assessment & Plan      Patient is a 89-year-old female with history significant for advanced dementia, CAD status post CABG, Parkinson's disease who presented to the ER with altered mental status and was diagnosed with left lower lobe pneumonia.    #Sepsis due to left lower lobe pneumonia  #Metabolic encephalopathy  --Patient presented encephalopathic and was diagnosed with left lower lobe pneumonia.,  Hypotensive  --Initial admit labs noted leukocytosis with left shift, BUN/creatinine 46/1.3, Pro-Kris negative, CRP 3.9  --COVID-19/flu negative  --Blood cultures NGTD, MRSA negative, swab negative, sputum culture unable to be produced  --CT chest with mild left basilar infiltrates  --No evidence for aspiration per speech  --Patient has medically improved, leukocytosis has resolved and remains hemodynamically stable on room air.  --Continue IV Azactam (penicillin allergy), Flagyl    #CAD status post three-vessel CABG  --Asymptomatic, troponins negative x2, EKG without ischemic changes  --Continue home Plavix, aspirin    #Parkinson's disease  #Advanced dementia  --Initial CT head negative on admission  --Continue home Zoloft, Depakote and carbidopa levodopa  --will add low-dose Seroquel at bedtime    #Right wrist injury, continue brace, follow-up outpatient with Ortho  #Ascending aortic aneurysm, stable, 4 cm, follow-up outpatient  #Essential hypertension,  normotensive, hold home meds  #Hyperlipidemia, continue statin  #Hypothyroidism, continue home Synthroid  #Osteoarthritis, continue as needed meds  #IBS-constipation, continue bowel regimen    CHECKLIST:  Abx: Azactam, Flagyl  Steroids: None  VTE: Lovenox  GI ppx: PPI  Diet: Consistent carb  Code: No CPR, limited  Dispo: Patient is medically improved, goals of care discussion today with palliative care.  Okay to stepdown to telemetry.  Dispo pending goals of care conversation,  Potentially back to SNF in 48hrs.     Gareth Mendez DO  [facility]  11/14/22  14:52 EST      Electronically signed by Gareth Mendez DO at 11/14/22 1514          Consult Notes (most recent note)      Kristian Henley MD at 11/14/22 1404      Consult Orders    1. Inpatient Palliative Care MD Consult [[id number]] ordered by Tyrone Betancourt MD at 11/11/22 2344               Palliative Care Initial Consult     Attending Physician: Gareth Mendez*  Referring Provider: Dr. Mendez      Code Status: No CPR  Code Status and Medical Interventions:   Ordered at: 11/12/22 0144     Medical Intervention Limits:    NO intubation (DNI)     Level Of Support Discussed With:    Health Care Surrogate     Code Status (Patient has no pulse and is not breathing):    No CPR (Do Not Attempt to Resuscitate)     Medical Interventions (Patient has pulse or is breathing):    Limited Support     Comments:    no central line; if vasopressors given, could only be given short term through peripheral line      Advanced Directives: Advance Directive Status: Patient has advance directive, copy in chart (copy in pts chartlet but not in epic)   Healthcare surrogate: DaughterEri      HPI: 89-year-old female with history of coronary artery disease, previous CABG and stenting, hypertension, hyperlipidemia, Parkinson's disease, dementia, previous tobacco use, and irritable bowel syndrome.  The patient lives in a skilled  nursing facility for her care, and her daughter Eri is her healthcare surrogate and is at bedside.  The daughter states that the patient has been quite stable up until a week ago at which time she complained of right wrist and hand pain and was noted to have marked swelling.  She was found to have a fracture with ligament tear but decision made not to do surgery due to her overall condition and decline.  Since that time, the patient has become increasingly confused, agitated, and has not been eating or drinking much.  She was noted to have foul smelling urine and found to have a urinary tract infection.  Today, we reviewed the patient's condition with her daughter, reviewed palliative measures as well as hospice services.  The daughter was quite tearful as she  explained her mother's decline, and states that she does not want to see her mother suffer.  After lengthy discussion, she agreed to a hospice consult for better pain management and closer follow-up when she is discharged.          ROS: Negative except as above in HPI.     Past Medical History:   Diagnosis Date   • Arthritis    • Ascending aortic aneurysm 10/6/2016   • Chronic hypokalemia 10/6/2016   • Colonic polyp 10/6/2016   • Dementia (HCC)    • Dyslipidemia 10/6/2016   • Gastroparesis 10/6/2016   • GERD with esophagitis 10/6/2016   • H/O blood clots    • Hypertension 10/6/2016   • IBS (irritable bowel syndrome) 10/6/2016   • IHD (ischemic heart disease) 10/6/2016   • Osteoarthritis 10/6/2016   • Osteoarthritis of left hip 10/6/2016   • Osteoporosis 10/6/2016   • Parkinson's disease (HCC) 10/6/2016   • Peripheral edema 10/6/2016   • Syncope 10/6/2016     Past Surgical History:   Procedure Laterality Date   • APPENDECTOMY      As a child   • CATARACT EXTRACTION WITH INTRAOCULAR LENS IMPLANT Bilateral     2006,2008   • CORONARY ANGIOPLASTY WITH STENT PLACEMENT  2009   • CORONARY ARTERY BYPASS GRAFT  2000    x3   • GASTRECTOMY PARTIAL / TOTAL      1970s    • LAPAROSCOPIC CHOLECYSTECTOMY     • SPLENECTOMY     • TONSILLECTOMY AND ADENOIDECTOMY      As a child   • TOTAL ABDOMINAL HYSTERECTOMY WITH SALPINGO OOPHORECTOMY  1973     Social History     Socioeconomic History   • Marital status:    Tobacco Use   • Smoking status: Former     Packs/day: 1.00     Types: Cigarettes     Quit date: 1992     Years since quittin.5   • Smokeless tobacco: Never   Vaping Use   • Vaping Use: Never used   Substance and Sexual Activity   • Alcohol use: No   • Drug use: No   • Sexual activity: Defer     Family History   Problem Relation Age of Onset   • Tuberculosis Mother    • No Known Problems Father        Allergies   Allergen Reactions   • Penicillins Anaphylaxis   • Reglan [Metoclopramide] Anaphylaxis   • Sulfa Antibiotics Anaphylaxis       Current Facility-Administered Medications   Medication Dose Route Frequency Provider Last Rate Last Admin   • acetaminophen (TYLENOL) tablet 500 mg  500 mg Oral Q6H PRN Althea Ivy DO       • aspirin EC tablet 81 mg  81 mg Oral Daily Althea Ivy DO   81 mg at 22 0851   • aztreonam (AZACTAM) 2 g in sodium chloride 0.9 % 100 mL IVPB-VTB  2 g Intravenous Q8H Althea Ivy DO   2 g at 22 1353   • bisacodyl (DULCOLAX) suppository 10 mg  10 mg Rectal Daily PRN Althea Iyv DO       • carbidopa-levodopa (SINEMET)  MG per tablet 1 tablet  1 tablet Oral BID Althea Ivy DO   1 tablet at 22 0851   • cetirizine (zyrTEC) tablet 10 mg  10 mg Oral Daily Althea Ivy DO   10 mg at 22 0851   • clopidogrel (PLAVIX) tablet 75 mg  75 mg Oral Daily Althea Ivy DO   75 mg at 22 0851   • divalproex (DEPAKOTE) DR tablet 250 mg  250 mg Oral Nightly Althea Ivy DO   250 mg at 22 2157   • docusate sodium (COLACE) capsule 200 mg  200 mg Oral Daily Althea Ivy DO   200 mg at 22 0851   • heparin (porcine) 5000 UNIT/ML injection 5,000 Units  5,000 Units Subcutaneous Q8H  Gareth Mendez DO   5,000 Units at 11/14/22 1354   • levothyroxine (SYNTHROID, LEVOTHROID) tablet 50 mcg  50 mcg Oral Daily Althea Ivy DO   50 mcg at 11/14/22 0851   • LORazepam (ATIVAN) injection 0.25 mg  0.25 mg Intravenous Q12H PRN Althea Ivy DO   0.25 mg at 11/14/22 1049   • metroNIDAZOLE (FLAGYL) IVPB 500 mg  500 mg Intravenous Q8H Tyrone Betancourt MD   500 mg at 11/14/22 0918   • multivitamin (THERAGRAN) tablet 1 tablet  1 tablet Oral Daily Althea Ivy DO   1 tablet at 11/14/22 0851   • nitroglycerin (NITROSTAT) SL tablet 0.4 mg  0.4 mg Sublingual Q5 Min PRN Tyrone Betancourt MD       • pantoprazole (PROTONIX) EC tablet 40 mg  40 mg Oral Daily Althea Ivy DO   40 mg at 11/13/22 0801   • potassium chloride ER (K-TAB) ER tablet 40 mEq  40 mEq Oral PRN Althea Ivy DO        Or   • potassium chloride (KLOR-CON) packet 40 mEq  40 mEq Oral PRN Althea Ivy DO        Or   • potassium chloride 10 mEq in 100 mL IVPB  10 mEq Intravenous Q1H PRN Althea Ivy DO       • senna (SENOKOT) tablet 1 tablet  1 tablet Oral Daily Althea Ivy DO   1 tablet at 11/14/22 0851   • senna (SENOKOT) tablet 1 tablet  1 tablet Oral Daily PRN Althea Ivy DO       • sertraline (ZOLOFT) tablet 50 mg  50 mg Oral Nightly Althea Ivy DO   50 mg at 11/13/22 2157   • sodium chloride 0.9 % flush 10 mL  10 mL Intravenous PRN Tyrone Betancourt MD       • sodium chloride 0.9 % flush 10 mL  10 mL Intravenous Q12H Tyrone Betancourt MD   10 mL at 11/14/22 0852   • sodium chloride 0.9 % flush 10 mL  10 mL Intravenous PRN Tyrone Betancourt MD       • traMADol (ULTRAM) tablet 50 mg  50 mg Oral Q12H PRN Althea Ivy DO   50 mg at 11/12/22 2018        •  acetaminophen  •  bisacodyl  •  LORazepam  •  nitroglycerin  •  potassium chloride ER **OR** potassium chloride **OR** potassium chloride  •  senna  •  [COMPLETED] Insert peripheral IV **AND** sodium chloride  •  sodium  "chloride  •  traMADol    Current medication reviewed for route, type, dose and frequency and are current per MAR.    Palliative Performance Scale Score:     /84   Pulse 57   Temp 98.3 °F (36.8 °C) (Oral)   Resp 18   Ht 154.9 cm (61\")   Wt 54.4 kg (119 lb 14.9 oz)   SpO2 96%   BMI 22.66 kg/m²     Intake/Output Summary (Last 24 hours) at 11/14/2022 1404  Last data filed at 11/14/2022 1000  Gross per 24 hour   Intake 1700 ml   Output 2550 ml   Net -850 ml       PE:  General Appearance:     ill appearing, yelling in bed   HEENT:    NC/AT   Neck:   Supple, dry mucous membranes   Lungs:     CTAB without w/r/r    Heart:    RRR, normal S1 and S2, no M/R/G   Abdomen:     Soft, nondistended   Extremities:  Swelling and tenderness to the right wrist and hand, generalized pain with any movement   Pulses:   Pulses palpable and equal bilaterally   Skin:   Warm, dry   Neurologic:  Agitated, yelling \"leave me alone!\"   Psych:  Agitated       Labs:   Results from last 7 days   Lab Units 11/13/22  0805   WBC 10*3/mm3 9.94   HEMOGLOBIN g/dL 10.7*   HEMATOCRIT % 35.6   PLATELETS 10*3/mm3 248     Results from last 7 days   Lab Units 11/13/22  0805   SODIUM mmol/L 145   POTASSIUM mmol/L 4.8   CHLORIDE mmol/L 118*   CO2 mmol/L 18.2*   BUN mg/dL 16   CREATININE mg/dL 0.59   GLUCOSE mg/dL 88   CALCIUM mg/dL 8.1*     Results from last 7 days   Lab Units 11/13/22  0805 11/12/22 2053 11/12/22  0347   SODIUM mmol/L 145  --  142   POTASSIUM mmol/L 4.8   < > 3.4*   CHLORIDE mmol/L 118*  --  111*   CO2 mmol/L 18.2*  --  19.0*   BUN mg/dL 16  --  37*   CREATININE mg/dL 0.59  --  0.99   CALCIUM mg/dL 8.1*  --  8.0*   BILIRUBIN mg/dL  --   --  0.2   ALK PHOS U/L  --   --  67   ALT (SGPT) U/L  --   --  10   AST (SGOT) U/L  --   --  22   GLUCOSE mg/dL 88  --  156*    < > = values in this interval not displayed.     Imaging Results (Last 72 Hours)     Procedure Component Value Units Date/Time    XR Wrist 3+ View Left [[id number]] Collected: " 11/13/22 1805     Updated: 11/13/22 1807    Narrative:      CR Wrist Min 3 Vws LT    INDICATION:   Left wrist bruising    COMPARISON:   None available.    FINDINGS:   AP, lateral, and oblique views of the left wrist.      Degenerative changes of the radiocarpal, carpal, and carpal metacarpal joints. No acute fracture or dislocation. No radiopaque foreign body.      Impression:      Degenerative changes of the wrist and carpal joints. No acute fracture or dislocation.    Signer Name: Pedro Jerome MD   Signed: 11/13/2022 6:05 PM   Workstation Name: [id number]    [facility]    CT Upper Extremity Right Without Contrast [[id number]] Collected: 11/11/22 1944     Updated: 11/11/22 1946    Narrative:      CT UE RT WO    INDICATION:    Right wrist pain and swelling.    TECHNIQUE:   CT of the right upper extremity. without IV contrast. Coronal and sagittal reconstructions were obtained.  Radiation dose reduction techniques included automated exposure control or exposure modulation based on body size. Count of known CT and cardiac  nuc med studies performed in previous 12 months: 0.     COMPARISON:    Right wrist x-rays 11/11/2022 and 11/8/2022    FINDINGS:  Examination is markedly limited secondary to patient positioning and motion artifact. Evaluation of the right wrist is essentially nondiagnostic secondary to motion on the exam. Remainder of the visualized right upper extremity appears grossly  unremarkable without evidence of a displaced fracture or dislocation.      Impression:      Essentially nondiagnostic exam secondary to patient positioning and motion artifact. The right wrist cannot be evaluated on the exam. No other gross acute findings.    Signer Name: Sheldon Jose MD   Signed: 11/11/2022 7:44 PM   Workstation Name: [id number]    [facility]    CT Chest Without Contrast Diagnostic [[id number]] Collected: 11/11/22 1940     Updated: 11/11/22 1942    Narrative:      CT  Chest WO, CT Abdomen Pelvis WO    INDICATION:   Sepsis. Altered mental status. Dementia.    TECHNIQUE:   CT of the chest, abdomen and pelvis without IV contrast. Coronal and sagittal reconstructions were obtained.  Radiation dose reduction techniques included automated exposure control or exposure modulation based on body size. Count of known CT and cardiac  nuc med studies performed in previous 12 months: 0.     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 Extensive atherosclerotic calcification throughout the thoracic aorta. Cardiomegaly. No pericardial effusions. Prior CABG. Mild left basilar atelectasis/infiltrate. No pleural effusions or pneumothorax.    Abdomen:   The liver is within normal limits. Spleen is not well visualized. Pancreas is grossly unremarkable. Suspected cholecystectomy. Both adrenal glands are normal. Stable right renal cyst. Kidneys are otherwise grossly unremarkable allowing for lack of IV  contrast. Abdominal aorta normal in course and caliber. Small bowel is unremarkable without obstruction. Normal appendix. The colon is unremarkable. No free fluid or free air.    Pelvis:   The urinary bladder is unremarkable.  Pelvic viscera is grossly unremarkable. No free pelvic fluid.    No acute osseous abnormality.        Impression:        1. Mild left basilar atelectasis/infiltrate.  2. 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  3. Cardiomegaly.  4. No gross acute abdominal or pelvic findings.          Signer Name: Sheldon Jose MD   Signed: 11/11/2022 7:40 PM   Workstation Name: [id number] Wines-    [facility]    CT Abdomen Pelvis Without Contrast [[id number]] Collected: 11/11/22 1940     Updated: 11/11/22 1942    Narrative:      CT Chest WO, CT Abdomen Pelvis WO    INDICATION:   Sepsis. Altered mental status. Dementia.    TECHNIQUE:   CT of the chest, abdomen and pelvis without IV contrast. Coronal and sagittal  reconstructions were obtained.  Radiation dose reduction techniques included automated exposure control or exposure modulation based on body size. Count of known CT and cardiac  nuc med studies performed in previous 12 months: 0.     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 Extensive atherosclerotic calcification throughout the thoracic aorta. Cardiomegaly. No pericardial effusions. Prior CABG. Mild left basilar atelectasis/infiltrate. No pleural effusions or pneumothorax.    Abdomen:   The liver is within normal limits. Spleen is not well visualized. Pancreas is grossly unremarkable. Suspected cholecystectomy. Both adrenal glands are normal. Stable right renal cyst. Kidneys are otherwise grossly unremarkable allowing for lack of IV  contrast. Abdominal aorta normal in course and caliber. Small bowel is unremarkable without obstruction. Normal appendix. The colon is unremarkable. No free fluid or free air.    Pelvis:   The urinary bladder is unremarkable.  Pelvic viscera is grossly unremarkable. No free pelvic fluid.    No acute osseous abnormality.        Impression:        1. Mild left basilar atelectasis/infiltrate.  2. 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  3. Cardiomegaly.  4. No gross acute abdominal or pelvic findings.          Signer Name: Sheldon Jose MD   Signed: 11/11/2022 7:40 PM   Workstation Name: [email or web address]    [facility]    CT Head Without Contrast [[id number]] Collected: 11/11/22 1936     Updated: 11/11/22 1938    Narrative:      CT Head WO    HISTORY: AMS    COMPARISON: CT brain 6/27/2018.    TECHNIQUE: CT of the brain without IV contrast. Coronal and sagittal reconstructions were obtained.  Radiation dose reduction techniques included automated exposure control or exposure modulation based on body size. Count of known CT and cardiac nuc med  studies performed in previous 12 months: 0.     Time of  Scan: 6:53 PM    FINDINGS:    No acute intracranial hemorrhage, mass effect, midline shift, or hydrocephalus.     Gray-white matter differentiation is within normal limits. Patchy hypodensities in the cerebral white matter, nonspecific but likely representing chronic microvascular ischemic changes.     Ventricles and cortical sulci are prominent, in keeping with cerebral volume loss. Basal cisterns are clear.     Calvarium is intact. Biparietal calvarial thinning.    Visualized paranasal sinuses are clear. Visualized mastoid air cells are clear.      Impression:        1.  No acute intracranial abnormality.  2.  Chronic microvascular ischemic disease and cerebral atrophy, progressed compared to prior study 6/27/2018.    Signer Name: Pedro Jerome MD   Signed: 11/11/2022 7:36 PM   Workstation Name: [id number]    [facility]    XR Chest 1 View [[id number]] Collected: 11/11/22 1619     Updated: 11/11/22 1622    Narrative:      EXAM:    XR Chest, 1 View     EXAM DATE:    11/11/2022 3:46 PM     CLINICAL HISTORY:    AMS     TECHNIQUE:    Frontal view of the chest.     COMPARISON:    03/16/2019     FINDINGS:    Lungs:  Pulmonary vascular congestion.  Interstitial thickening.  Left  basilar airspace disease.    Pleural space:  Unremarkable.  No pneumothorax.    Heart:  Cardiomegaly.  CABG.    Mediastinum:  Unremarkable.    Bones/joints:  Stable bony structures.       Impression:      1.  CHF/edema.  2.  Mild left basilar airspace disease.     This report was finalized on 11/11/2022 4:20 PM by Dr. Edwin Mike MD.             Diagnostics: Reviewed    A: 89-year-old female with multiple medical conditions who has had a significant decline, has evidence of trauma to her right wrist and hand, and has had a significant decline.  She is confused, agitated, and appears to have pain with any movement.  Lengthy discussion with the daughter who agrees to a hospice consult.  The plan is to send her back to the  skilled nursing facility when discharged.       P: Continue present management.  A hospice referral has been placed, we will continue to monitor symptoms and offer symptom management as well as support to the daughter.    We appreciate the consult and the opportunity to participate in Marilyn Sood's care. We will continue to follow along. Please do not hesitate to contact us regarding further symptom management or goals of care needs, including after hours or on weekends via our on call provider at [phone].     Time: 50 minutes spent reviewing medical and medication records, assessing and examining patient, discussing with family, answering questions, providing some guidance about a plan and documentation of care, and coordinating care with other healthcare members, with > 50% time spent face to face.     Kristian Henley MD    11/14/2022    Electronically signed by Kristian Henley MD at 11/14/22 1413       Physical Therapy Notes (most recent note)    No notes exist for this encounter.         Occupational Therapy Notes (most recent note)    No notes exist for this encounter.

## 2022-11-14 NOTE — PAYOR COMM NOTE
"Marilyn Sood (89 y.o. Female)     Date of Birth   04/14/1930    Social Security Number       Address   [address] [place] [address]    Home Phone   [phone]    MRN   [id number]       Pentecostal   None    Marital Status                               Admission Date   11/11/22    Admission Type   Emergency    Admitting Provider   Tyrone Betancourt MD    Attending Provider   Gareth Mednez DO    Department, Room/Bed   [facility] PROGRESS CARE, P211/1P       Discharge Date       Discharge Disposition       Discharge Destination                               Attending Provider: Gareth Mendez DO    Allergies: Penicillins, Reglan [Metoclopramide], Sulfa Antibiotics    Isolation: None   Infection: None   Code Status: No CPR    Ht: 154.9 cm (61\")   Wt: 54.4 kg (119 lb 14.9 oz)    Admission Cmt: None   Principal Problem: Severe sepsis (HCC) [A41.9,R65.20]                 Active Insurance as of 11/11/2022     Primary Coverage     Payor Plan Insurance Group Employer/Plan Group    [facility] MEDICARE REPLACEMENT [facility] MEDICARE REPLACEMENT [id number]     Payor Plan Address Payor Plan Phone Number Payor Plan Fax Number Effective Dates    [address] [address]   8/1/2021 - None Entered    [facility] [id number]       Subscriber Name Subscriber Birth Date Member ID       MARILYN SOOD 4/14/1930 [id number]           Secondary Coverage     Payor Plan Insurance Group Employer/Plan Group    [place]      Payor Plan Address Payor Plan Phone Number Payor Plan Fax Number Effective Dates    [address] [phone]  11/11/2022 - None Entered    [facility] [id number]       Subscriber Name Subscriber Birth Date Member ID       MARILYN SOOD 4/14/1930 [id number]                 Emergency Contacts      (Rel.) Home Phone Work Phone Mobile Phone    Eri Gray (Daughter) [phone] -- --    ALMITA LAMB (Grandchild) " [phone] -- --    RODNEY CROWDER (Grandchild) [phone] -- --               History & Physical      Janet Parker PA-C at [phone]     Attestation signed by Tyrone Betancourt MD at [phone]    I have seen and examined this patient independently from Janet Parker PA-C, and have reviewed this documentation and agree. BP already improving with continuation of maintenance IV fluids. Patient resting in bed, opening eyes to voice and speaking some but not answering questions appropriately at this time. Daughter at bedside. Discussed further regarding whether she would want a central line for vasopressor support if vasopressors became necessary, and when she learned that the central line would have to go in either her groin, neck or upper chest wall, she felt that was too invasive and declined such intervention. Will add this detail to patient's code status order. Daughter is strongly considering palliative care/hospice moving forward. Palliative care team consulted to discuss goals of care further with her. In the mean time, continue conservative management with IV antibiotics and IV fluids.                       [facility] Emergency Room Medicine Services  History & Physical    Patient Identification:  Name:  Marilyn Sood  Age:  89 y.o.  Sex:  female  :  1930  MRN:  [id number]   Visit Number:  [id number]  Primary Care Physician:  Jarod Burnett MD    Subjective     2022   Chief complaint:   Chief Complaint   Patient presents with   • Altered Mental Status     History of presenting illness:      Marilyn Sood is a 89 y.o. female with past medical history significant for ascending aortic aneurysm, coronary artery disease status post three-vessel CABG and stenting, hyperlipidemia, essential hypertension, irritable bowel syndrome, osteoarthritis, Parkinson's disease, dementia, former tobacco abuse who presents to [facility] emergency  department with complaints of altered mental status. Patient has underlying dementia and unable to provide a history. Patient's daughter present at bedside who provides majority of history. Patient's daughter reports that she had a recent injury to her right wrist and seen orthopedic surgery in the outpatient setting today who states patient has a suspected fracture and ligament tear. The mechanism of the injury is unknown. Orthopedic surgery is deciding to avoid surgery if possible and recommending to wear a brace.Patient's daughter states that since the injury patient has been unable to get out of bed. Daughter was contacted by the nursing home this evening a couple of hours after patient got back from her orthopedic surgery appointment reporting that the patient was limp and pale. The daughter states that the patient had a low-grade fever (99.8) a couple of days ago. Patient was swabbed for COVID and was negative. The daughter states that the patient did have very foul-smelling urine. She states she has not had any difficulty swallowing or choking on her medications or food, but notes that patient has had decreased oral intake since her injury. She has had no nausea or vomiting. Denies any cough or obvious increased work of breathing.     The patient has a MOST form documenting that she is DNR/DNI from the nursing home. Daughter is requesting more time to discuss with other family members to decide if she wants to proceed with vasopressors and central line if patient was to become hypotensive again.     Upon arrival to the ED, vital signs were temperature 97.7, heart rate 74, respirations 18, blood pressure 87/64, SPO2 95% on room air.  CMP with glucose 143, CO2 18.6, anion gap 16.4, creatinine 1.28, BUN 46, BUN/creatinine ratio 35.9, EGFR 39.4, albumin 3.29, otherwise unremarkable.  CBC with RDW 16, RDW-SD 54.5, otherwise unremarkable.  CRP 3.92.  Lactate 3.2.  Procalcitonin 0.09.  Troponin T negative x1.   proBNP 1369.  Pending peripheral blood cultures x2.  COVID-19 and influenza A/B swab negative.  Urinalysis with trace ketones, trace blood, 1+ leukocytes, 35 RBC, 6-12 WBC, 1+ bacteria.  Pending urine culture.  Chest x-ray with CHF/edema, mild left basilar airspace disease.  CT chest and abdomen/pelvis with mild left basilar atelectasis/infiltrate, 4 cm ectasia of ascending thoracic aorta, cardiomegaly.  CT head without acute intracranial abnormality, chronic microvascular ischemic disease and cerebral atrophy.  CT right upper extremity essentially nondiagnostic secondary to patient positioning and motion artifact, right wrist cannot be evaluated on exam otherwise no acute findings.    Known Emergency Department medications received prior to my evaluation included IV Azactam, IV Ativan 0.25 mg, IV Zofran 4 mg, 2.497 mL fluid bolus, IV vancomycin.    Patient will be admitted to the PCU for further evaluation and monitoring.     ---------------------------------------------------------------------------------------------------------------------   Review of Systems   Unable to perform ROS: Dementia (History obtained from Daughter (Eri) at bedside)   Constitutional: Positive for activity change (Decreased since recent wrist injury), appetite change (Decreased) and fever (T-max 99.8).   Respiratory: Negative for choking and shortness of breath.    Gastrointestinal: Negative for nausea and vomiting.   Genitourinary:        Reports foul-smelling urine   Musculoskeletal:        Swelling of right upper extremity        ---------------------------------------------------------------------------------------------------------------------   Past Medical History:   Diagnosis Date   • Arthritis    • Ascending aortic aneurysm 10/6/2016   • Chronic hypokalemia 10/6/2016   • Colonic polyp 10/6/2016   • Dementia (HCC)    • Dyslipidemia 10/6/2016   • Gastroparesis 10/6/2016   • GERD with esophagitis 10/6/2016   • H/O blood clots    •  Hypertension 10/6/2016   • IBS (irritable bowel syndrome) 10/6/2016   • IHD (ischemic heart disease) 10/6/2016   • Osteoarthritis 10/6/2016   • Osteoarthritis of left hip 10/6/2016   • Osteoporosis 10/6/2016   • Parkinson's disease (HCC) 10/6/2016   • Peripheral edema 10/6/2016   • Syncope 10/6/2016     Past Surgical History:   Procedure Laterality Date   • APPENDECTOMY      As a child   • CATARACT EXTRACTION WITH INTRAOCULAR LENS IMPLANT Bilateral     ,   • CORONARY ANGIOPLASTY WITH STENT PLACEMENT     • CORONARY ARTERY BYPASS GRAFT  2000    x3   • GASTRECTOMY PARTIAL / TOTAL      1970s   • LAPAROSCOPIC CHOLECYSTECTOMY     • SPLENECTOMY     • TONSILLECTOMY AND ADENOIDECTOMY      As a child   • TOTAL ABDOMINAL HYSTERECTOMY WITH SALPINGO OOPHORECTOMY  1973     Family History   Problem Relation Age of Onset   • Tuberculosis Mother    • No Known Problems Father      Social History     Socioeconomic History   • Marital status:    Tobacco Use   • Smoking status: Former     Packs/day: 1.00     Types: Cigarettes     Quit date: 1992     Years since quittin.5   • Smokeless tobacco: Never   Vaping Use   • Vaping Use: Never used   Substance and Sexual Activity   • Alcohol use: No   • Drug use: No   • Sexual activity: Defer     ---------------------------------------------------------------------------------------------------------------------   Allergies:  Penicillins, Reglan [metoclopramide], and Sulfa antibiotics  ---------------------------------------------------------------------------------------------------------------------   Home medications:    Medications below are reported home medications pulling from within the system; at this time, these medications have not been reconciled unless otherwise specified and are in the verification process for further verifcation as current home medications.  (Not in a hospital admission)      Hospital Scheduled Meds:          Current listed  hospital scheduled medications may not yet reflect those currently placed in orders that are signed and held awaiting patient's arrival to floor.   ---------------------------------------------------------------------------------------------------------------------     Objective     Vital Signs:  Temp:  [97.7 °F (36.5 °C)] 97.7 °F (36.5 °C)  Heart Rate:  [70-80] 80  Resp:  [18] 18  BP: ()/(57-73) 113/73      11/11/22  1539   Weight: 49.9 kg (110 lb)     Body mass index is 20.78 kg/m².  ---------------------------------------------------------------------------------------------------------------------       Physical Exam  Constitutional:       General: She is awake.      Appearance: She is normal weight.      Comments: Patient resting in bed.  Becomes agitated when performing exam. Appears in no acute distress.    HENT:      Head: Normocephalic and atraumatic.      Right Ear: External ear normal.      Left Ear: External ear normal.      Nose: Nose normal.      Mouth/Throat:      Mouth: Mucous membranes are dry.      Pharynx: Oropharynx is clear.   Eyes:      Extraocular Movements: Extraocular movements intact.      Conjunctiva/sclera: Conjunctivae normal.      Pupils: Pupils are equal, round, and reactive to light.   Cardiovascular:      Rate and Rhythm: Normal rate and regular rhythm.      Pulses: Normal pulses.           Radial pulses are 2+ on the right side and 2+ on the left side.        Dorsalis pedis pulses are 2+ on the right side and 2+ on the left side.        Posterior tibial pulses are 2+ on the right side and 2+ on the left side.      Heart sounds: Normal heart sounds. No murmur heard.    No friction rub. No gallop.   Pulmonary:      Effort: Pulmonary effort is normal. No accessory muscle usage or respiratory distress.      Breath sounds: Normal breath sounds. No decreased breath sounds, wheezing, rhonchi or rales.   Abdominal:      General: Abdomen is flat. Bowel sounds are normal. There is no  distension.      Palpations: Abdomen is soft.      Tenderness: There is no abdominal tenderness. There is no guarding.   Musculoskeletal:         General: Swelling present.      Cervical back: Normal range of motion and neck supple.      Right lower leg: No edema.      Left lower leg: No edema.      Comments: Right wrist noted to be edematous. Brace in place.    Skin:     General: Skin is warm and dry.      Findings: No erythema or rash.   Neurological:      Mental Status: She is alert.      Comments: Difficult to exam as patient not answering questions or participating in exam. Does have dementia at baseline   Psychiatric:         Behavior: Behavior is uncooperative and agitated.      Comments: Becomes agitated when attempting to examine.          ---------------------------------------------------------------------------------------------------------------------  EKG:     Pending cardiology interpretation.  Per my review, EKG shows normal sinus rhythm with heart rate 72 and  ms without acute ischemic changes.    I have personally looked at both the EKG and the telemetry strips.  ---------------------------------------------------------------------------------------------------------------------   Results from last 7 days   Lab Units 11/11/22  1938 11/11/22  1646 11/08/22  2111   CRP mg/dL  --  3.92*  --    LACTATE mmol/L 1.1 3.2*  --    WBC 10*3/mm3  --  7.76 11.33*   HEMOGLOBIN g/dL  --  12.0 11.4*   HEMATOCRIT %  --  37.6 36.2   MCV fL  --  92.2 94.0   MCHC g/dL  --  31.9 31.5   PLATELETS 10*3/mm3  --  309 276         Results from last 7 days   Lab Units 11/11/22  1646 11/08/22  2111   SODIUM mmol/L 140 136   POTASSIUM mmol/L 3.7 4.4   CHLORIDE mmol/L 105 101   CO2 mmol/L 18.6* 22.6   BUN mg/dL 46* 25*   CREATININE mg/dL 1.28* 1.05*   CALCIUM mg/dL 9.3 8.9   GLUCOSE mg/dL 143* 111*   ALBUMIN g/dL 3.29* 3.26*   BILIRUBIN mg/dL 0.2 0.4   ALK PHOS U/L 88 73   AST (SGOT) U/L 24 16   ALT (SGPT) U/L 10 <5    Estimated Creatinine Clearance: 22.1 mL/min (A) (by C-G formula based on SCr of 1.28 mg/dL (H)).  No results found for: AMMONIA  Results from last 7 days   Lab Units 11/11/22  1938 11/11/22  1646 11/08/22  2111   CK TOTAL U/L  --   --  48   TROPONIN T ng/mL <0.010 <0.010  --      Results from last 7 days   Lab Units 11/11/22  1646   PROBNP pg/mL 1,369.0     Lab Results   Component Value Date    HGBA1C 6.6 (H) 08/06/2015     Lab Results   Component Value Date    TSH 4.850 (H) 11/11/2022    FREET4 0.72 (L) 11/11/2022     No results found for: PREGTESTUR, PREGSERUM, HCG, HCGQUANT  Pain Management Panel     Pain Management Panel Latest Ref Rng & Units 8/5/2015    AMPHETAMINES SCREEN, URINE NEGATIVE Negative    BARBITURATES SCREEN NEGATIVE Negative    BENZODIAZEPINE SCREEN, URINE NEGATIVE Negative    COCAINE SCREEN, URINE NEGATIVE Negative    METHADONE SCREEN, URINE NEGATIVE Negative            ---------------------------------------------------------------------------------------------------------------------  Imaging Results (Last 7 Days)     Procedure Component Value Units Date/Time    CT Upper Extremity Right Without Contrast [[id number]] Collected: 11/11/22 1944     Updated: 11/11/22 1946    Narrative:      CT UE RT WO    INDICATION:    Right wrist pain and swelling.    TECHNIQUE:   CT of the right upper extremity. without IV contrast. Coronal and sagittal reconstructions were obtained.  Radiation dose reduction techniques included automated exposure control or exposure modulation based on body size. Count of known CT and cardiac  nuc med studies performed in previous 12 months: 0.     COMPARISON:    Right wrist x-rays 11/11/2022 and 11/8/2022    FINDINGS:  Examination is markedly limited secondary to patient positioning and motion artifact. Evaluation of the right wrist is essentially nondiagnostic secondary to motion on the exam. Remainder of the visualized right upper extremity appears grossly  unremarkable  without evidence of a displaced fracture or dislocation.      Impression:      Essentially nondiagnostic exam secondary to patient positioning and motion artifact. The right wrist cannot be evaluated on the exam. No other gross acute findings.    Signer Name: Sheldon Jose MD   Signed: 11/11/2022 7:44 PM   Workstation Name: [id number]    [facility]    CT Chest Without Contrast Diagnostic [[id number]] Collected: 11/11/22 1940     Updated: 11/11/22 1942    Narrative:      CT Chest WO, CT Abdomen Pelvis WO    INDICATION:   Sepsis. Altered mental status. Dementia.    TECHNIQUE:   CT of the chest, abdomen and pelvis without IV contrast. Coronal and sagittal reconstructions were obtained.  Radiation dose reduction techniques included automated exposure control or exposure modulation based on body size. Count of known CT and cardiac  nuc med studies performed in previous 12 months: 0.     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 Extensive atherosclerotic calcification throughout the thoracic aorta. Cardiomegaly. No pericardial effusions. Prior CABG. Mild left basilar atelectasis/infiltrate. No pleural effusions or pneumothorax.    Abdomen:   The liver is within normal limits. Spleen is not well visualized. Pancreas is grossly unremarkable. Suspected cholecystectomy. Both adrenal glands are normal. Stable right renal cyst. Kidneys are otherwise grossly unremarkable allowing for lack of IV  contrast. Abdominal aorta normal in course and caliber. Small bowel is unremarkable without obstruction. Normal appendix. The colon is unremarkable. No free fluid or free air.    Pelvis:   The urinary bladder is unremarkable.  Pelvic viscera is grossly unremarkable. No free pelvic fluid.    No acute osseous abnormality.        Impression:        1. Mild left basilar atelectasis/infiltrate.  2. 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  3.  Cardiomegaly.  4. No gross acute abdominal or pelvic findings.          Signer Name: Sheldon Jose MD   Signed: 11/11/2022 7:40 PM   Workstation Name: [email or web address]    [facility]    CT Abdomen Pelvis Without Contrast [[id number]] Collected: 11/11/22 1940     Updated: 11/11/22 1942    Narrative:      CT Chest WO, CT Abdomen Pelvis WO    INDICATION:   Sepsis. Altered mental status. Dementia.    TECHNIQUE:   CT of the chest, abdomen and pelvis without IV contrast. Coronal and sagittal reconstructions were obtained.  Radiation dose reduction techniques included automated exposure control or exposure modulation based on body size. Count of known CT and cardiac  nuc med studies performed in previous 12 months: 0.     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 Extensive atherosclerotic calcification throughout the thoracic aorta. Cardiomegaly. No pericardial effusions. Prior CABG. Mild left basilar atelectasis/infiltrate. No pleural effusions or pneumothorax.    Abdomen:   The liver is within normal limits. Spleen is not well visualized. Pancreas is grossly unremarkable. Suspected cholecystectomy. Both adrenal glands are normal. Stable right renal cyst. Kidneys are otherwise grossly unremarkable allowing for lack of IV  contrast. Abdominal aorta normal in course and caliber. Small bowel is unremarkable without obstruction. Normal appendix. The colon is unremarkable. No free fluid or free air.    Pelvis:   The urinary bladder is unremarkable.  Pelvic viscera is grossly unremarkable. No free pelvic fluid.    No acute osseous abnormality.        Impression:        1. Mild left basilar atelectasis/infiltrate.  2. 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  3. Cardiomegaly.  4. No gross acute abdominal or pelvic findings.          Signer Name: Sheldon Jose MD   Signed: 11/11/2022 7:40 PM   Workstation Name: [email or web address]    [facility]    CT Head Without Contrast [[id number]] Collected: 11/11/22 1936     Updated: 11/11/22 1938    Narrative:      CT Head WO    HISTORY: AMS    COMPARISON: CT brain 6/27/2018.    TECHNIQUE: CT of the brain without IV contrast. Coronal and sagittal reconstructions were obtained.  Radiation dose reduction techniques included automated exposure control or exposure modulation based on body size. Count of known CT and cardiac nuc med  studies performed in previous 12 months: 0.     Time of Scan: 6:53 PM    FINDINGS:    No acute intracranial hemorrhage, mass effect, midline shift, or hydrocephalus.     Gray-white matter differentiation is within normal limits. Patchy hypodensities in the cerebral white matter, nonspecific but likely representing chronic microvascular ischemic changes.     Ventricles and cortical sulci are prominent, in keeping with cerebral volume loss. Basal cisterns are clear.     Calvarium is intact. Biparietal calvarial thinning.    Visualized paranasal sinuses are clear. Visualized mastoid air cells are clear.      Impression:        1.  No acute intracranial abnormality.  2.  Chronic microvascular ischemic disease and cerebral atrophy, progressed compared to prior study 6/27/2018.    Signer Name: Pedro Jerome MD   Signed: 11/11/2022 7:36 PM   Workstation Name: [id number]    [facility]    XR Chest 1 View [[id number]] Collected: 11/11/22 1619     Updated: 11/11/22 1622    Narrative:      EXAM:    XR Chest, 1 View     EXAM DATE:    11/11/2022 3:46 PM     CLINICAL HISTORY:    AMS     TECHNIQUE:    Frontal view of the chest.     COMPARISON:    03/16/2019     FINDINGS:    Lungs:  Pulmonary vascular congestion.  Interstitial thickening.  Left  basilar airspace disease.    Pleural space:  Unremarkable.  No pneumothorax.    Heart:  Cardiomegaly.  CABG.    Mediastinum:  Unremarkable.    Bones/joints:  Stable bony structures.       Impression:      1.  CHF/edema.  2.   Mild left basilar airspace disease.     This report was finalized on 11/11/2022 4:20 PM by Dr. Edwin Mike MD.           I have personally reviewed the above radiology images and read the final radiology report on 11/11/22  ---------------------------------------------------------------------------------------------------------------------  Assessment / Plan     Active Hospital Problems    Diagnosis  POA   • **Severe sepsis (HCC) [A41.9, R65.20]  Yes       ASSESSMENT/PLAN:  -Possible left lower lobe pneumonia, HCAP vs aspiration, POA  -Acute urinary tract infection, POA  -Severe sepsis criteria secondary to above with C-RP >2, lactate 3.2, AMS, hypotension responding to fluid rescuscitation, POA  -Anion-gap metabolic acidosis likely due to above, POA  -UA with trace blood, 1+ leukocytes, 3-5 RBC, 6-12 WBC, and 1+ bacteria.  -Urine sample sent for culture, follow-up on results.   -CT chest with mild left basilar atelectasis/infiltrate.   -Procalcitonin normal.   -Received IV Vancomycin and Azactam in ED; will continue on admission and will add IV Flagyl to cover for possible aspiration.  -Received sepsis bolus in ED (1497 mL); continuous IV fluids ordered.  -Obtain MRSA nasal swab.   -Repeat a.m. CBC, CMP, and C-RP. Trend lactate until normalized.   -SLP consulted to evaluate for dysphagia/silent aspiration.   -Aspiration precautions in place.    -Altered mental status, suspect metabolic encephalopathy 2/2 above, POA  -Parkinson's disease  -Dementia  -Debility  -CT head without acute abnormality.   -Continue treatment as outlined above and monitor for improvement.   -Up with assistance; fall precautions.   -Aspiration precautions in place.   -Palliative care consulted for C discussions.    -Recent right wrist injury, mechanism of injury unknown  -Appointment with orthopedic surgery 11/12 and per daughter's report patient has possible wrist fracture and ligament tear, no surgery recommended at this time, and  continue use of wrist brace.  -CT RUE nondiagnostic due to patient position and motion artifact.   -Continue outpatient follow-up with orthopedic surgery.     -Ascending aortic aneurysm, 4 cm  -CT abdomen/pelvis with stable appearance of ascending aortic aneurysm compared to imaging from 08/2020.  -Continue close follow-up and monitoring in the outpatient setting.     -CAD s/p 3 vessel CABG and stenting  -Troponin T negative x 2.   -EKG without acute ischemic changes.   -Review home medications once reconciled.     -Essential hypertension, currently with hypotension  -Hyperlipidemia  -Currently with hypotension (BP 87/54); daughter requesting more time to discuss with family about initiating vasopressors; will start continuous IV fluids in the meantime while awaiting family's decision.   -Monitor per floor protocol.   -Hold home anti-hypertensives until BP stabilizes.     -Hypothyroidism  -TSH 4.850. Free T4 0.72.   -May need dose adjustment of Synthroid once reconciled and repeat thyroid function tests in the outpatient setting.     -Osteoarthritis  -Supportive care.   -Review home medications once reconciled.    -Irritable bowel syndrome  -Review home medications once reconciled.   -------------------------------  F/E/N: Continuous IV fluids. Replace electrolytes as needed. NPO diet.   DVT prophylaxis: SQ Heparin  Activity: Up with assistance, fall precautions  -------------------------------  CODE STATUS: DNR/DNI; DaughterEri, requesting more time to discuss with other family members if want to proceed with vasopressors.    High risk secondary to severe sepsis, acute UTI, possible pneumonia of left lower lobe, metabolic encephalopathy in setting of dementia, recent right wrist injury  -------------------------------  Expected length of stay:  INPATIENT status due to the need for care which can only be reasonably provided in an hospital setting such as aggressive/expedited ancillary services and/or  "consultation services, the necessity for IV medications, close physician monitoring and/or the possible need for procedures.  In such, I feel patient's risk for adverse outcomes and need for care warrant INPATIENT evaluation and predict the patient's care encounter to likely last beyond 2 midnights.     Disposition: Pending clinical course. Likely back to nursing home once medically stable.    Janet Parker PA-C  11/11/22  22:52 EST    ---------------------------------------------------------------------------------------------------------------------           Electronically signed by Tyrone Betancourt MD at 11/12/22 0143          Emergency Department Notes      Ivy Villalpando PA at 11/11/22 2018     Attestation signed by Celestino Ram MD at 11/11/22 6302        SUPERVISE: For this patient encounter, I reviewed the APC's documentation, treatment plan, and medical decision making.  Celestino Ram MD 11/11/2022 21:42 EST                         Subjective   History of Present Illness  89 yo female patient presents to the ED by EMS from NH.  Pt daughter is at bedside. Daughter states she went to ortho appt this morning and received a call around 230PM that she was not acting herself and was \"slumped over\" in the wheelchair. Daughter states that she has not been her self for about two weeks. They had went to see her a couple of weeks ago and found to have right wrist swelling into the hand. Unknown mechanism of injury. She had a xray and doppler that was normal the swelling continued to worsen. She was sent to the ED and referred to ortho. Patient has reportedly not been herself since. Pt has been less active and in the bed. She has not been going to the cafeteria or outside like she normal would. Family reports when visiting with her she will fall asleep and \"slump over\" while talking to them.      History provided by:  Patient   used: No        Review of Systems "   Constitutional: Negative.    HENT: Negative.    Eyes: Negative.    Respiratory: Negative.    Cardiovascular: Negative.    Gastrointestinal: Negative.    Endocrine: Negative.    Genitourinary: Negative.    Musculoskeletal: Negative.    Skin: Negative.    Allergic/Immunologic: Negative.    Neurological: Negative.    Hematological: Negative.    Psychiatric/Behavioral: Positive for confusion.   All other systems reviewed and are negative.      Past Medical History:   Diagnosis Date   • Arthritis    • Ascending aortic aneurysm 10/6/2016   • Chronic hypokalemia 10/6/2016   • Colonic polyp 10/6/2016   • Dementia (HCC)    • Dyslipidemia 10/6/2016   • Gastroparesis 10/6/2016   • GERD with esophagitis 10/6/2016   • H/O blood clots    • Hypertension 10/6/2016   • IBS (irritable bowel syndrome) 10/6/2016   • IHD (ischemic heart disease) 10/6/2016   • Osteoarthritis 10/6/2016   • Osteoarthritis of left hip 10/6/2016   • Osteoporosis 10/6/2016   • Parkinson's disease (HCC) 10/6/2016   • Peripheral edema 10/6/2016   • Syncope 10/6/2016       Allergies   Allergen Reactions   • Penicillins Anaphylaxis   • Reglan [Metoclopramide] Anaphylaxis   • Sulfa Antibiotics Anaphylaxis       Past Surgical History:   Procedure Laterality Date   • APPENDECTOMY      As a child   • CATARACT EXTRACTION WITH INTRAOCULAR LENS IMPLANT Bilateral     2006,2008   • CORONARY ANGIOPLASTY WITH STENT PLACEMENT  2009   • CORONARY ARTERY BYPASS GRAFT  2000    x3   • GASTRECTOMY PARTIAL / TOTAL      1970s   • LAPAROSCOPIC CHOLECYSTECTOMY  2003   • SPLENECTOMY  2007   • TONSILLECTOMY AND ADENOIDECTOMY      As a child   • TOTAL ABDOMINAL HYSTERECTOMY WITH SALPINGO OOPHORECTOMY  1973       Family History   Problem Relation Age of Onset   • Tuberculosis Mother    • No Known Problems Father        Social History     Socioeconomic History   • Marital status:    Tobacco Use   • Smoking status: Former     Packs/day: 1.00     Types: Cigarettes     Quit date:  1992     Years since quittin.5   • Smokeless tobacco: Never   Vaping Use   • Vaping Use: Never used   Substance and Sexual Activity   • Alcohol use: No   • Drug use: No   • Sexual activity: Defer           Objective   Physical Exam  Vitals and nursing note reviewed.   Constitutional:       Appearance: She is well-developed and normal weight. She is ill-appearing.   HENT:      Head: Normocephalic and atraumatic.      Mouth/Throat:      Mouth: Mucous membranes are moist.      Pharynx: Oropharynx is clear.   Eyes:      Extraocular Movements: Extraocular movements intact.      Pupils: Pupils are equal, round, and reactive to light.   Cardiovascular:      Rate and Rhythm: Normal rate and regular rhythm.      Heart sounds: Normal heart sounds.   Pulmonary:      Effort: Pulmonary effort is normal.      Breath sounds: Normal breath sounds.   Abdominal:      General: Bowel sounds are normal.      Palpations: Abdomen is soft.   Musculoskeletal:         General: Normal range of motion.      Cervical back: Normal range of motion and neck supple.   Skin:     General: Skin is warm and dry.      Capillary Refill: Capillary refill takes less than 2 seconds.   Neurological:      Mental Status: She is alert. Mental status is at baseline. She is confused.   Psychiatric:         Mood and Affect: Mood normal.         Speech: Speech normal.         Behavior: Behavior normal.         Procedures          ED Course  ED Course as of 22   1624 XR Chest 1 View  IMPRESSION:  1.  CHF/edema.  2.  Mild left basilar airspace disease.     This report was finalized on 2022 4:20 PM by Dr. Edwin Mike MD.    [ML]   1810 Lactate(!!): 3.2 [ML]   1930 Reassessed after fluid bolus. Patient no longer hypotensive. Lungs clear.  [ML]    CT Upper Extremity Right Without Contrast     IMPRESSION:  Essentially nondiagnostic exam secondary to patient positioning and motion artifact. The right wrist cannot be  evaluated on the exam. No other gross acute findings.     Signer Name: Sheldon Jose MD   Signed: 11/11/2022 7:44 PM   Workstation Name: [facility]    [facility]        Specimen Collected: 11/11/22 19:44 EST Last Resulted: 11/11/22 19:44 EST           [ML]   1953 CT Chest Without Contrast Diagnostic  IMPRESSION:     1. Mild left basilar atelectasis/infiltrate.  2. 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  3. Cardiomegaly.  4. No gross acute abdominal or pelvic findings.              Signer Name: Sheldon Jose MD   Signed: 11/11/2022 7:40 PM   Workstation Name: [facility]    [facility]        Specimen Collected: 11/11/22 19:40 EST Last Resulted: 11/11/22 19:40 EST         [ML]   1953 CT Abdomen Pelvis Without Contrast        IMPRESSION:     1. Mild left basilar atelectasis/infiltrate.  2. 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  3. Cardiomegaly.  4. No gross acute abdominal or pelvic findings.              Signer Name: Sheldon Jose MD   Signed: 11/11/2022 7:40 PM   Workstation Name: [facility]    [facility]        Specimen Collected: 11/11/22 19:40 EST Last Resulted: 11/11/22 19:40 EST         [ML]   1954 CT Head Without Contrast  IMPRESSION:     1.  No acute intracranial abnormality.  2.  Chronic microvascular ischemic disease and cerebral atrophy, progressed compared to prior study 6/27/2018.     Signer Name: Pedro Jerome MD   Signed: 11/11/2022 7:36 PM   Workstation Name: [id number]    [facility]        Specimen Collected: 11/11/22 19:36 EST Last Resulted: 11/11/22 19:36 EST           [ML]   2059 PT admitted with Dr. Betancourt.  [ML]      ED Course User Index  [ML] Ivy Villalpando PA                                           MDM  Number of Diagnoses or Management Options     Amount and/or Complexity of Data Reviewed  Clinical lab tests: reviewed and ordered  Tests in the radiology section of CPT®:  ordered and reviewed  Discuss the patient with other providers: yes    Risk of Complications, Morbidity, and/or Mortality  Presenting problems: moderate  Diagnostic procedures: moderate  Management options: moderate    Patient Progress  Patient progress: improved      Final diagnoses:   Sepsis, due to unspecified organism, unspecified whether acute organ dysfunction present (HCC)   Pneumonia of left lower lobe due to infectious organism   Acute UTI       ED Disposition  ED Disposition     ED Disposition   Decision to Admit    Condition   --    Comment   Level of Care: Progressive Care [20]   Diagnosis: Severe sepsis (HCC) [2108399]   Admitting Physician: DEVEN GIBSON [[id number]]   Attending Physician: DEVEN GIBSON [[id number]]   Certification: I Certify That Inpatient Hospital Services Are Medically Necessary For Greater Than 2 Midnights               No follow-up provider specified.       Medication List      No changes were made to your prescriptions during this visit.          Ivy Villalpando PA  11/11/22 2120      Electronically signed by Celestino Ram MD at 11/11/22 2142     Bettie Salazar, RN at 11/11/22 2120        Pt vomited in bed, Patient was suctioned to ensure no aspiration, patient was cleaned and assisted with repositioning, NADN, Provider made aware, awaiting new orders.     Electronically signed by Bettie Salazar RN at 11/11/22 2207     Bettie Salazar RN at 11/11/22 2155        Called PCU to give report, was told that there wasn't a nurse to take the patient, That an RN has been called in and that PCU would call us back to get report as soon as someone could take the patient, ED 2 lead nurse informed.     Electronically signed by Bettie Salazar RN at 11/11/22 2208     Argentina Orona, RN at 11/11/22 2230        Called PCU and they stated that the nurse coming in to take report on the pt has not arrived yet and could not give an ETA for when the nurse would be here.  "    Electronically signed by Argentina Orona RN at 22 2081            Physician Progress Notes (last 48 hours)      Althea Ivy DO at 22 1134              [facility] HOSPITALIST PROGRESS NOTE     Patient Identification:  Name:  Marilyn Sood  Age:  89 y.o.  Sex:  female  :  1930  MRN:  [id number]  Visit Number:  [id number]  ROOM: [address]     Primary Care Provider:  Jarod Burnett MD    Length of stay in inpatient status:  2    Subjective     Chief Compliant:    Chief Complaint   Patient presents with   • Altered Mental Status       History of Presenting Illness:    Patient seen and examined this morning in PCU room 211, no family present at bedside. I spoke to her am nurse, DALIA Peña earlier this morning and she reported that patient was very agitated and distressed due to confusion. Said that her daughter reported she needed a dose of ativan in the ER for similar symptoms, and she \"hated to see her that way.\" One time low dose ativan given and patient has been resting comfortably since then. On my exam patient is sleeping soundly and doesn't give helpful history at this time.    Objective     Current Hospital Meds:aspirin, 81 mg, Oral, Daily  aztreonam, 2 g, Intravenous, Q12H  carbidopa-levodopa, 1 tablet, Oral, BID  cetirizine, 10 mg, Oral, Daily  clopidogrel, 75 mg, Oral, Daily  divalproex, 250 mg, Oral, Nightly  docusate sodium, 200 mg, Oral, Daily  heparin (porcine), 5,000 Units, Subcutaneous, Q12H  levothyroxine, 50 mcg, Oral, Daily  metroNIDAZOLE, 500 mg, Intravenous, Q8H  multivitamin, 1 tablet, Oral, Daily  pantoprazole, 40 mg, Oral, Daily  senna, 1 tablet, Oral, Daily  sertraline, 50 mg, Oral, Nightly  sodium chloride, 10 mL, Intravenous, Q12H    sodium chloride, 125 mL/hr, Last Rate: 125 mL/hr (22 0334)        Current Antimicrobial Therapy:  Anti-Infectives (From admission, onward)    Ordered     Dose/Rate Route Frequency Start Stop    22 " 2347  aztreonam (AZACTAM) 2 g in sodium chloride 0.9 % 100 mL IVPB-VTB        Ordering Provider: Tyrone Betancourt MD    2 g  over 4 Hours Intravenous Every 12 Hours 11/12/22 0600 11/17/22 0559    11/11/22 2323  metroNIDAZOLE (FLAGYL) IVPB 500 mg        Ordering Provider: Tyrone Betancourt MD    500 mg  over 30 Minutes Intravenous Every 8 Hours 11/12/22 0100 11/17/22 0059    11/11/22 1731  aztreonam (AZACTAM) 2 g in sodium chloride 0.9 % 100 mL IVPB-VTB        Ordering Provider: Ivy Villalpando PA    2 g  200 mL/hr over 30 Minutes Intravenous Once 11/11/22 1733 11/11/22 1827    11/11/22 1731  vancomycin (VANCOCIN) 1,000 mg in sodium chloride 0.9 % 250 mL IVPB        Ordering Provider: Ivy Villalpando PA    20 mg/kg × 49.9 kg Intravenous Once 11/11/22 1733 11/11/22 2132        Current Diuretic Therapy:  Diuretics (From admission, onward)    None        ----------------------------------------------------------------------------------------------------------------------  Vital Signs:  Temp:  [97.8 °F (36.6 °C)-98.6 °F (37 °C)] 98.6 °F (37 °C)  Heart Rate:  [58-71] 58  Resp:  [16-18] 18  BP: ()/() 135/82  SpO2:  [92 %-96 %] 94 %  on   ;   Device (Oxygen Therapy): room air  Body mass index is 22.67 kg/m².    Wt Readings from Last 3 Encounters:   11/13/22 54.4 kg (120 lb)   11/11/22 48.1 kg (106 lb)   11/08/22 48.1 kg (106 lb)     Intake & Output (last 3 days)       11/10 0701  11/11 0700 11/11 0701  11/12 0700 11/12 0701  11/13 0700 11/13 0701 11/14 0700    P.O.  0 120 0    I.V. (mL/kg)  1979.9 (38.9) 3000 (55.1)     IV Piggyback  1847 550     Total Intake(mL/kg)  3826.9 (75.2) 3670 (67.5) 0 (0)    Urine (mL/kg/hr)   600 (0.5) 500 (2)    Total Output   600 500    Net  +3826.9 +3070 -500            Urine Unmeasured Occurrence  1 x 1 x         Diet Pureed;  Thin  ----------------------------------------------------------------------------------------------------------------------  Physical exam:   Constitutional:  Well-developed and well-nourished.  No acute distress.      HENT:  Head:  Normocephalic and atraumatic.  Mouth:  Moist mucous membranes.    Cardiovascular:  Normal rate, regular rhythm and normal heart sounds with no murmur.  Pulmonary/Chest:  No respiratory distress. No wheeze. Coarse breath sounds bilaterally  Abdominal:  Soft. No distension and no tenderness.   Musculoskeletal:  No tenderness, and no deformity.  No red or swollen joints anywhere.    Neurological:  Sleeping soundly    Skin:  Skin is warm and dry.   Peripheral vascular:  No cyanosis, no edema.    ----------------------------------------------------------------------------------------------------------------------  Results from last 7 days   Lab Units 11/13/22  0805 11/12/22  0347 11/11/22  1938 11/11/22  1646   CRP mg/dL  --  2.85*  --  3.92*   LACTATE mmol/L  --   --  1.1 3.2*   WBC 10*3/mm3 9.94 13.65*  --  7.76   HEMOGLOBIN g/dL 10.7* 10.4*  --  12.0   HEMATOCRIT % 35.6 32.7*  --  37.6   MCV fL 99.7* 93.2  --  92.2   MCHC g/dL 30.1* 31.8  --  31.9   PLATELETS 10*3/mm3 248 248  --  309         Results from last 7 days   Lab Units 11/13/22  0805 11/12/22 2053 11/12/22 0347 11/11/22  1646 11/08/22  2111   SODIUM mmol/L 145  --  142 140 136   POTASSIUM mmol/L 4.8 4.5 3.4* 3.7 4.4   CHLORIDE mmol/L 118*  --  111* 105 101   CO2 mmol/L 18.2*  --  19.0* 18.6* 22.6   BUN mg/dL 16  --  37* 46* 25*   CREATININE mg/dL 0.59  --  0.99 1.28* 1.05*   CALCIUM mg/dL 8.1*  --  8.0* 9.3 8.9   GLUCOSE mg/dL 88  --  156* 143* 111*   ALBUMIN g/dL  --   --  2.52* 3.29* 3.26*   BILIRUBIN mg/dL  --   --  0.2 0.2 0.4   ALK PHOS U/L  --   --  67 88 73   AST (SGOT) U/L  --   --  22 24 16   ALT (SGPT) U/L  --   --  10 10 <5   Estimated Creatinine Clearance: 52.2 mL/min (by C-G formula based on SCr of 0.59  mg/dL).  No results found for: AMMONIA  Results from last 7 days   Lab Units 11/11/22  1938 11/11/22  1646 11/08/22  2111   CK TOTAL U/L  --   --  48   TROPONIN T ng/mL <0.010 <0.010  --      Results from last 7 days   Lab Units 11/11/22  1646   PROBNP pg/mL 1,369.0         No results found for: HGBA1C, POCGLU  Lab Results   Component Value Date    TSH 4.850 (H) 11/11/2022    FREET4 0.72 (L) 11/11/2022     No results found for: PREGTESTUR, PREGSERUM, HCG, HCGQUANT  Pain Management Panel     Pain Management Panel Latest Ref Rng & Units 8/5/2015    AMPHETAMINES SCREEN, URINE NEGATIVE Negative    BARBITURATES SCREEN NEGATIVE Negative    BENZODIAZEPINE SCREEN, URINE NEGATIVE Negative    COCAINE SCREEN, URINE NEGATIVE Negative    METHADONE SCREEN, URINE NEGATIVE Negative        Brief Urine Lab Results  (Last result in the past 365 days)      Color   Clarity   Blood   Leuk Est   Nitrite   Protein   CREAT   Urine HCG        11/11/22 1951 Dark Yellow   Clear   Trace   Small (1+)   Negative   Negative               Blood Culture   Date Value Ref Range Status   11/11/2022 No growth at 24 hours  Preliminary   11/11/2022 No growth at 24 hours  Preliminary     Urine Culture   Date Value Ref Range Status   11/11/2022 <10,000 CFU/mL Normal Urogenital Amira  Final     No results found for: WOUNDCX  No results found for: STOOLCX  No results found for: RESPCX  No results found for: AFBCX  Results from last 7 days   Lab Units 11/12/22  0347 11/11/22 1938 11/11/22 1646   PROCALCITONIN ng/mL  --   --  0.09   LACTATE mmol/L  --  1.1 3.2*   CRP mg/dL 2.85*  --  3.92*       I have personally looked at the labs and they are summarized above.  ----------------------------------------------------------------------------------------------------------------------  Detailed radiology reports for the last 24 hours:  Imaging Results (Last 24 Hours)     ** No results found for the last 24 hours. **        Assessment & Plan    #Left lower lobe  pneumonia, HAP versus aspiration, present on admission   #Abnormal UA, present on admission   #Severe sepsis criteria met CRP greater than 2, lactate 3.2, altered mental status, hypotension responding to fluid resuscitation--secondary to UTI and pneumonia, present on admission   #Anion gap metabolic acidosis likely secondary to above, present on admission   - Patient much improved today, no longer hypotensive  - CRP improving, lactate normalized  - Azactam, vanc, and flagyl started at admission (patient has anaphylactic allergy to penicillins). MRSA screen negative so will discontinue vancomycin today. Continue azactam and flagyl.  - UA was suggestive of UTI on admission, but urine culture has now resulted with growth of <10,000 CFU/mL normal urogenital edilberto, ruling out clinically significant UTI.   - Follow up blood culture results  - SLP evaluated patient and diet recommendations have been made. No evidence of aspiration or silent aspiration noted during their evaluation.  - Repeat cbc, bmp in am    #Altered mental status, suspect metabolic encephalopathy 2/2 above, POA  #Parkinson's disease  #Dementia  #Debility  - No acute abnormality on CT head at admission. Continue treatment of underlying infections.   - Palliative care consulted for [facility] discussions. I discussed treatment goals with family at bedside today also, and they wish to continue treatment of her infections but want to avoid invasive aggressive measures like intubation or a central line.  - Continue home sertraline, depakote, and carbidopa-levodopa  - One time low dose ativan needed today due to significant agitation and patient distress    #Right wrist injury  - Unfortunately unable to evaluate with CT due to patient positioning yesterday. Reportedly patient following with Ortho outpatient for possible wrist fracture and ligament tear. Continue brace. Follow up with Ortho outpatient.    #Ascending aortic aneurysm, 4 cm  - Stable in size on CT  abd/pelvis at admission  - Monitor. Recommend outpatient follow up.    #Coronary artery disease s/p 3 vessel CABG and stenting  - Troponin negative x2 at admission, EKG without acute ischemic changes.  - Continue home plavix  - Home torsemide held at admission due to hypotension. Monitor fluid status closely. Currently appears euvolemic so will continue to hold, but consider restarting in the next few days.    #Essential hypertension, with hypotension on admission  #Hypotension present on admission, now resolved  #Hyperlipidemia  - Holding antihypertensives due to hypotension. Monitor per hospital protocol. Family did not want central line which would be needed for vasopressors. BP is much improved but still within normal range. Restart antihypertensives if it is staying significantly elevated.    #Hypothyroidism  - Continue home levothyroxine  - TSH 4.85, free T4 0.72. May need adjustment in outpatient setting after repeating thyroid function tests.     #Osteoarthritis  - Continue supportive care. Home tramadol reordered but with parameters to hold if SBP<100, HR<60, or signs of respiratory depression.    #Irritable bowel syndrome  - Continue home dulcolax, senna  Edited by: Althea Ivy DO at 2022 1134    VTE Prophylaxis:   Mechanical Order History:     None      Pharmalogical Order History:      Ordered     Dose Route Frequency Stop    22 2323  heparin (porcine) 5000 UNIT/ML injection 5,000 Units         5,000 Units SC Every 12 Hours Scheduled --                Dispo: pending clinical improvement    Althea Ivy DO  [facility]  22  11:34 EST      Electronically signed by Althea Ivy DO at 22 1144     Althea Ivy DO at 22 1149              [facility]     Patient Identification:  Name:  Marilyn Sood  Age:  89 y.o.  Sex:  female  :  1930  MRN:  [id number]  Visit Number:  [id number]  ROOM: [address]      Primary Care Provider:  Jarod Burnett MD    Length of stay in inpatient status:  1    Subjective     Chief Compliant:    Chief Complaint   Patient presents with   • Altered Mental Status       History of Presenting Illness:    Patient seen and examined with her daughter and granddaughter present at bedside. Patient is confused and unable to give much history. Daughter reports she has had a rapid decline in health over the past week. She says they do not want invasive aggressive interventions like intubation or central line placement, but do want to continue antibiotics and IV fluids, etc.     Objective     Current Hospital Meds:aztreonam, 2 g, Intravenous, Q12H  heparin (porcine), 5,000 Units, Subcutaneous, Q12H  metroNIDAZOLE, 500 mg, Intravenous, Q8H  potassium chloride, 40 mEq, Oral, Q4H  sodium chloride, 10 mL, Intravenous, Q12H  vancomycin, 750 mg, Intravenous, Q24H    sodium chloride, 125 mL/hr, Last Rate: 125 mL/hr (11/12/22 0622)        Current Antimicrobial Therapy:  Anti-Infectives (From admission, onward)    Ordered     Dose/Rate Route Frequency Start Stop    11/12/22 0839  vancomycin 750 mg/250 mL 0.9% NS IVPB ([facility])        Ordering Provider: Althea Ivy DO    750 mg  over 60 Minutes Intravenous Every 24 Hours 11/12/22 1800 11/16/22 1759    11/11/22 2347  aztreonam (AZACTAM) 2 g in sodium chloride 0.9 % 100 mL IVPB-VTB        Ordering Provider: Tyrone Betancourt MD    2 g  over 4 Hours Intravenous Every 12 Hours 11/12/22 0600 11/17/22 0559    11/11/22 2323  metroNIDAZOLE (FLAGYL) IVPB 500 mg        Ordering Provider: Tyrone Betancourt MD    500 mg  over 30 Minutes Intravenous Every 8 Hours 11/12/22 0100 11/17/22 0059    11/11/22 1731  aztreonam (AZACTAM) 2 g in sodium chloride 0.9 % 100 mL IVPB-VTB        Ordering Provider: Ivy Villalpando PA    2 g  200 mL/hr over 30 Minutes Intravenous Once 11/11/22 1733 11/11/22 1827    11/11/22 1731  vancomycin (VANCOCIN) 1,000 mg  in sodium chloride 0.9 % 250 mL IVPB        Ordering Provider: Ivy Villalpando PA    20 mg/kg × 49.9 kg Intravenous Once 11/11/22 1733 11/11/22 2132        Current Diuretic Therapy:  Diuretics (From admission, onward)    None        ----------------------------------------------------------------------------------------------------------------------  Vital Signs:  Temp:  [97.7 °F (36.5 °C)-98.9 °F (37.2 °C)] 98.4 °F (36.9 °C)  Heart Rate:  [65-80] 68  Resp:  [12-18] 18  BP: ()/(43-73) 85/50  SpO2:  [90 %-99 %] 94 %  on   ;   Device (Oxygen Therapy): room air  Body mass index is 21.2 kg/m².    Wt Readings from Last 3 Encounters:   11/12/22 50.9 kg (112 lb 3.4 oz)   11/11/22 48.1 kg (106 lb)   11/08/22 48.1 kg (106 lb)     Intake & Output (last 3 days)       11/09 0701  11/10 0700 11/10 0701  11/11 0700 11/11 0701  11/12 0700 11/12 0701  11/13 0700    P.O.   0     I.V. (mL/kg)   1979.9 (38.9)     IV Piggyback   1847     Total Intake(mL/kg)   3826.9 (75.2)     Net   +3826.9             Urine Unmeasured Occurrence   1 x         Diet Pureed; Thin  ----------------------------------------------------------------------------------------------------------------------  Physical exam:   Constitutional:  Well-developed and well-nourished.  No acute distress.      HENT:  Head:  Normocephalic and atraumatic.   Cardiovascular:  Normal rate, regular rhythm and normal heart sounds with no murmur.  Pulmonary/Chest:  No respiratory distress. Minimal crackles in right lateral lung field  Abdominal:  Soft. No distension and no tenderness.   Musculoskeletal:  Right wrist is in brace.     Neurological:  Resting in bed when I entered the room. She opens eyes and responds verbally to verbal stimulation, but seems confused.     Skin:  Skin is warm and dry. No rash noted. No pallor.   Peripheral vascular:  No cyanosis, no edema.  Psychiatric: Appropriate mood and  affect    ----------------------------------------------------------------------------------------------------------------------  Results from last 7 days   Lab Units 11/12/22 0347 11/11/22 1938 11/11/22 1646 11/08/22 2111   CRP mg/dL 2.85*  --  3.92*  --    LACTATE mmol/L  --  1.1 3.2*  --    WBC 10*3/mm3 13.65*  --  7.76 11.33*   HEMOGLOBIN g/dL 10.4*  --  12.0 11.4*   HEMATOCRIT % 32.7*  --  37.6 36.2   MCV fL 93.2  --  92.2 94.0   MCHC g/dL 31.8  --  31.9 31.5   PLATELETS 10*3/mm3 248  --  309 276         Results from last 7 days   Lab Units 11/12/22 0347 11/11/22 1646 11/08/22 2111   SODIUM mmol/L 142 140 136   POTASSIUM mmol/L 3.4* 3.7 4.4   CHLORIDE mmol/L 111* 105 101   CO2 mmol/L 19.0* 18.6* 22.6   BUN mg/dL 37* 46* 25*   CREATININE mg/dL 0.99 1.28* 1.05*   CALCIUM mg/dL 8.0* 9.3 8.9   GLUCOSE mg/dL 156* 143* 111*   ALBUMIN g/dL 2.52* 3.29* 3.26*   BILIRUBIN mg/dL 0.2 0.2 0.4   ALK PHOS U/L 67 88 73   AST (SGOT) U/L 22 24 16   ALT (SGPT) U/L 10 10 <5   Estimated Creatinine Clearance: 29.1 mL/min (by C-G formula based on SCr of 0.99 mg/dL).  No results found for: AMMONIA  Results from last 7 days   Lab Units 11/11/22 1938 11/11/22  1646 11/08/22  2111   CK TOTAL U/L  --   --  48   TROPONIN T ng/mL <0.010 <0.010  --      Results from last 7 days   Lab Units 11/11/22  1646   PROBNP pg/mL 1,369.0         No results found for: HGBA1C, POCGLU  Lab Results   Component Value Date    TSH 4.850 (H) 11/11/2022    FREET4 0.72 (L) 11/11/2022     No results found for: PREGTESTUR, PREGSERUM, HCG, HCGQUANT  Pain Management Panel     Pain Management Panel Latest Ref Rng & Units 8/5/2015    AMPHETAMINES SCREEN, URINE NEGATIVE Negative    BARBITURATES SCREEN NEGATIVE Negative    BENZODIAZEPINE SCREEN, URINE NEGATIVE Negative    COCAINE SCREEN, URINE NEGATIVE Negative    METHADONE SCREEN, URINE NEGATIVE Negative        Brief Urine Lab Results  (Last result in the past 365 days)      Color   Clarity   Blood   Leuk Est    Nitrite   Protein   CREAT   Urine HCG        11/11/22 1951 Dark Yellow   Clear   Trace   Small (1+)   Negative   Negative               No results found for: BLOODCX  No results found for: URINECX  No results found for: WOUNDCX  No results found for: STOOLCX  No results found for: RESPCX  No results found for: AFBCX  Results from last 7 days   Lab Units 11/12/22  0347 11/11/22  1938 11/11/22  1646   PROCALCITONIN ng/mL  --   --  0.09   LACTATE mmol/L  --  1.1 3.2*   CRP mg/dL 2.85*  --  3.92*       I have personally looked at the labs and they are summarized above.  ----------------------------------------------------------------------------------------------------------------------  Detailed radiology reports for the last 24 hours:  Imaging Results (Last 24 Hours)     Procedure Component Value Units Date/Time    CT Upper Extremity Right Without Contrast [[id number]] Collected: 11/11/22 1944     Updated: 11/11/22 1946    Narrative:      CT UE RT WO    INDICATION:    Right wrist pain and swelling.    TECHNIQUE:   CT of the right upper extremity. without IV contrast. Coronal and sagittal reconstructions were obtained.  Radiation dose reduction techniques included automated exposure control or exposure modulation based on body size. Count of known CT and cardiac  nuc med studies performed in previous 12 months: 0.     COMPARISON:    Right wrist x-rays 11/11/2022 and 11/8/2022    FINDINGS:  Examination is markedly limited secondary to patient positioning and motion artifact. Evaluation of the right wrist is essentially nondiagnostic secondary to motion on the exam. Remainder of the visualized right upper extremity appears grossly  unremarkable without evidence of a displaced fracture or dislocation.      Impression:      Essentially nondiagnostic exam secondary to patient positioning and motion artifact. The right wrist cannot be evaluated on the exam. No other gross acute findings.    Signer Name: Sheldon Jose MD    Signed: 11/11/2022 7:44 PM   Workstation Name: [facility]    [facility]    CT Chest Without Contrast Diagnostic [[id number]] Collected: 11/11/22 1940     Updated: 11/11/22 1942    Narrative:      CT Chest WO, CT Abdomen Pelvis WO    INDICATION:   Sepsis. Altered mental status. Dementia.    TECHNIQUE:   CT of the chest, abdomen and pelvis without IV contrast. Coronal and sagittal reconstructions were obtained.  Radiation dose reduction techniques included automated exposure control or exposure modulation based on body size. Count of known CT and cardiac  nuc med studies performed in previous 12 months: 0.     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 Extensive atherosclerotic calcification throughout the thoracic aorta. Cardiomegaly. No pericardial effusions. Prior CABG. Mild left basilar atelectasis/infiltrate. No pleural effusions or pneumothorax.    Abdomen:   The liver is within normal limits. Spleen is not well visualized. Pancreas is grossly unremarkable. Suspected cholecystectomy. Both adrenal glands are normal. Stable right renal cyst. Kidneys are otherwise grossly unremarkable allowing for lack of IV  contrast. Abdominal aorta normal in course and caliber. Small bowel is unremarkable without obstruction. Normal appendix. The colon is unremarkable. No free fluid or free air.    Pelvis:   The urinary bladder is unremarkable.  Pelvic viscera is grossly unremarkable. No free pelvic fluid.    No acute osseous abnormality.        Impression:        1. Mild left basilar atelectasis/infiltrate.  2. 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  3. Cardiomegaly.  4. No gross acute abdominal or pelvic findings.          Signer Name: Sheldon Jose MD   Signed: 11/11/2022 7:40 PM   Workstation Name: [facility]    [facility]    CT Abdomen Pelvis Without Contrast [[id number]] Collected: 11/11/22 1940      Updated: 11/11/22 1942    Narrative:      CT Chest WO, CT Abdomen Pelvis WO    INDICATION:   Sepsis. Altered mental status. Dementia.    TECHNIQUE:   CT of the chest, abdomen and pelvis without IV contrast. Coronal and sagittal reconstructions were obtained.  Radiation dose reduction techniques included automated exposure control or exposure modulation based on body size. Count of known CT and cardiac  nuc med studies performed in previous 12 months: 0.     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 Extensive atherosclerotic calcification throughout the thoracic aorta. Cardiomegaly. No pericardial effusions. Prior CABG. Mild left basilar atelectasis/infiltrate. No pleural effusions or pneumothorax.    Abdomen:   The liver is within normal limits. Spleen is not well visualized. Pancreas is grossly unremarkable. Suspected cholecystectomy. Both adrenal glands are normal. Stable right renal cyst. Kidneys are otherwise grossly unremarkable allowing for lack of IV  contrast. Abdominal aorta normal in course and caliber. Small bowel is unremarkable without obstruction. Normal appendix. The colon is unremarkable. No free fluid or free air.    Pelvis:   The urinary bladder is unremarkable.  Pelvic viscera is grossly unremarkable. No free pelvic fluid.    No acute osseous abnormality.        Impression:        1. Mild left basilar atelectasis/infiltrate.  2. 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  3. Cardiomegaly.  4. No gross acute abdominal or pelvic findings.          Signer Name: Sheldon Jose MD   Signed: 11/11/2022 7:40 PM   [email or web address]    [facility]    CT Head Without Contrast [[id number]] Collected: 11/11/22 1936     Updated: 11/11/22 1938    Narrative:      CT Head WO    HISTORY: AMS    COMPARISON: CT brain 6/27/2018.    TECHNIQUE: CT of the brain without IV contrast. Coronal and sagittal reconstructions  were obtained.  Radiation dose reduction techniques included automated exposure control or exposure modulation based on body size. Count of known CT and cardiac nuc med  studies performed in previous 12 months: 0.     Time of Scan: 6:53 PM    FINDINGS:    No acute intracranial hemorrhage, mass effect, midline shift, or hydrocephalus.     Gray-white matter differentiation is within normal limits. Patchy hypodensities in the cerebral white matter, nonspecific but likely representing chronic microvascular ischemic changes.     Ventricles and cortical sulci are prominent, in keeping with cerebral volume loss. Basal cisterns are clear.     Calvarium is intact. Biparietal calvarial thinning.    Visualized paranasal sinuses are clear. Visualized mastoid air cells are clear.      Impression:        1.  No acute intracranial abnormality.  2.  Chronic microvascular ischemic disease and cerebral atrophy, progressed compared to prior study 6/27/2018.    Signer Name: Pedro Jerome MD   Signed: 11/11/2022 7:36 PM   Workstation Name: [id number]    [facility]    XR Chest 1 View [[id number]] Collected: 11/11/22 1619     Updated: 11/11/22 1622    Narrative:      EXAM:    XR Chest, 1 View     EXAM DATE:    11/11/2022 3:46 PM     CLINICAL HISTORY:    AMS     TECHNIQUE:    Frontal view of the chest.     COMPARISON:    03/16/2019     FINDINGS:    Lungs:  Pulmonary vascular congestion.  Interstitial thickening.  Left  basilar airspace disease.    Pleural space:  Unremarkable.  No pneumothorax.    Heart:  Cardiomegaly.  CABG.    Mediastinum:  Unremarkable.    Bones/joints:  Stable bony structures.       Impression:      1.  CHF/edema.  2.  Mild left basilar airspace disease.     This report was finalized on 11/11/2022 4:20 PM by Dr. Edwin Mike MD.           Assessment & Plan      #Left lower lobe pneumonia, HAP versus aspiration, POA  #Acute urinary tract infection, POA  #Severe sepsis criteria met CRP greater than  2, lactate 3.2, altered mental status, hypotension responding to fluid resuscitation--secondary to UTI and pneumonia, POA  #Anion gap metabolic acidosis likely secondary to above, POA  - CRP improving, lactate normalized  - Azactam, vanc, and flagyl started at admission (patient has anaphylactic allergy to penicillins). MRSA screen ordered, if result is negative then likely can discontinue vancomycin.  - UA suggestive of UTI, urine culture in process.   - Follow up urine culture, blood culture results  - SLP evaluated patient today and diet recommendations made. No evidence of aspiration or silent aspiration noted.   - Repeat cbc, bmp in am    #Altered mental status, suspect metabolic encephalopathy 2/2 above, POA  #Parkinson's disease  #Dementia  #Debility  - No acute abnormality on CT head at admission. Continue treatment of underlying infections.   - Palliative care consulted for [facility] discussions. I discussed treatment goals with family at bedside today also, and they wish to continue treatment of her infections but want to avoid invasive aggressive measures like intubation or a central line.  - Continue home sertraline, depakote, and carbidopa-levodopa    #Right wrist injury  - Unfortunately unable to evaluate with CT due to patient positioning yesterday. Reportedly patient following with Ortho outpatient for possible wrist fracture and ligament tear. Continue brace. Follow up with Ortho outpatient.    #Ascending aortic aneurysm, 4 cm  - Stable in size on CT abd/pelvis at admission  - Monitor. Recommend outpatient follow up.    #Coronary artery disease s/p 3 vessel CABG and stenting  - Troponin negative x2 at admission, EKG without acute ischemic changes.  - Continue home plavix  - Holding home torsemide due to hypotension    #Essential hypertension, currently with hypotension  #Hyperlipidemia  - Holding antihypertensives due to hypotension. Monitor per hospital protocol. Family does not want central line which  would be needed for vasopressors.     #Hypothyroidism  - Continue home levothyroxine  - TSH 4.85, free T4 0.72. May need adjustment in outpatient setting after repeating thyroid function tests.     #Osteoarthritis  - Continue supportive care. Home tramadol reordered but with parameters to hold if SBP<100, HR<60, or signs of respiratory depression.    #Irritable bowel syndrome  - Continue home dulcolax, senna  Edited by: Althea Ivy DO at 11/12/2022 1328      VTE Prophylaxis:   Mechanical Order History:     None      Pharmalogical Order History:      Ordered     Dose Route Frequency Stop    11/11/22 2323  heparin (porcine) 5000 UNIT/ML injection 5,000 Units         5,000 Units SC Every 12 Hours Scheduled --                Dispo: pending clinical course    Althea Ivy DO  [facility]  11/12/22  11:49 EST      Electronically signed by Althea Ivy DO at 11/12/22 [id number]

## 2022-11-14 NOTE — PLAN OF CARE
Goal Outcome Evaluation:  Plan of Care Reviewed With: patient        Progress: improving  Outcome Evaluation: Pt. remains confused. RA. VSS. Purewick in place. NS infusing @ 75mL/hr. Pt. resting comfortably at this time. Bed alarm set, call light within reach.      Problem: Fall Injury Risk  Goal: Absence of Fall and Fall-Related Injury  Outcome: Ongoing, Progressing  Intervention: Identify and Manage Contributors  Recent Flowsheet Documentation  Taken 11/14/2022 0113 by Meenakshi Kent RN  Medication Review/Management: medications reviewed  Taken 11/13/2022 2300 by Meenakshi Kent RN  Medication Review/Management: medications reviewed  Taken 11/13/2022 2200 by Meenakshi Kent RN  Medication Review/Management: medications reviewed  Taken 11/13/2022 2100 by Meenakshi Kent RN  Medication Review/Management: medications reviewed  Taken 11/13/2022 1900 by Meenakshi Kent RN  Medication Review/Management: medications reviewed  Intervention: Promote Injury-Free Environment  Recent Flowsheet Documentation  Taken 11/14/2022 0113 by Meenakshi Kent RN  Safety Promotion/Fall Prevention:   activity supervised   fall prevention program maintained   safety round/check completed  Taken 11/13/2022 2300 by Meenakshi Kent RN  Safety Promotion/Fall Prevention:   activity supervised   fall prevention program maintained   safety round/check completed  Taken 11/13/2022 2200 by Meenakshi Kent RN  Safety Promotion/Fall Prevention:   activity supervised   fall prevention program maintained   safety round/check completed  Taken 11/13/2022 2100 by Meenakshi Kent RN  Safety Promotion/Fall Prevention:   activity supervised   fall prevention program maintained   safety round/check completed  Taken 11/13/2022 1900 by Meenakshi Kent RN  Safety Promotion/Fall Prevention:   activity supervised   fall prevention program maintained   safety round/check completed     Problem: Skin Injury Risk Increased  Goal: Skin Health and Integrity  Outcome: Ongoing,  Progressing  Intervention: Promote and Optimize Oral Intake  Recent Flowsheet Documentation  Taken 11/13/2022 2200 by Meenakshi Kent RN  Oral Nutrition Promotion: rest periods promoted  Intervention: Optimize Skin Protection  Recent Flowsheet Documentation  Taken 11/14/2022 0113 by Meenakshi Kent RN  Pressure Reduction Techniques:   weight shift assistance provided   rest period provided between sit times   pressure points protected   heels elevated off bed  Pressure Reduction Devices:   pressure-redistributing mattress utilized   positioning supports utilized  Skin Protection:   adhesive use limited   incontinence pads utilized   pulse oximeter probe site changed   transparent dressing maintained   tubing/devices free from skin contact  Taken 11/13/2022 2200 by Meenakshi Kent RN  Pressure Reduction Techniques:   weight shift assistance provided   pressure points protected   positioned off wounds   heels elevated off bed  Pressure Reduction Devices:   pressure-redistributing mattress utilized   positioning supports utilized  Skin Protection:   adhesive use limited   incontinence pads utilized   pulse oximeter probe site changed   transparent dressing maintained   tubing/devices free from skin contact     Problem: Behavioral Health Comorbidity  Goal: Maintenance of Behavioral Health Symptom Control  Outcome: Ongoing, Progressing  Intervention: Maintain Behavioral Health Symptom Control  Recent Flowsheet Documentation  Taken 11/14/2022 0113 by Meenakshi Kent RN  Medication Review/Management: medications reviewed  Taken 11/13/2022 2300 by Meenakshi Kent RN  Medication Review/Management: medications reviewed  Taken 11/13/2022 2200 by Meenakshi Kent RN  Medication Review/Management: medications reviewed  Taken 11/13/2022 2100 by Meenakshi Kent RN  Medication Review/Management: medications reviewed  Taken 11/13/2022 1900 by Meenakshi Kent RN  Medication Review/Management: medications reviewed     Problem: Osteoarthritis  Comorbidity  Goal: Maintenance of Osteoarthritis Symptom Control  Outcome: Ongoing, Progressing  Intervention: Maintain Osteoarthritis Symptom Control  Recent Flowsheet Documentation  Taken 11/14/2022 0113 by Meenakshi Kent RN  Activity Management: activity adjusted per tolerance  Medication Review/Management: medications reviewed  Taken 11/13/2022 2300 by Meenakshi Kent RN  Medication Review/Management: medications reviewed  Taken 11/13/2022 2200 by Meenakshi Kent RN  Activity Management: activity adjusted per tolerance  Medication Review/Management: medications reviewed  Taken 11/13/2022 2100 by Meenakshi Kent RN  Medication Review/Management: medications reviewed  Taken 11/13/2022 1900 by Meenakshi Kent RN  Medication Review/Management: medications reviewed     Problem: Pain Chronic (Persistent) (Comorbidity Management)  Goal: Acceptable Pain Control and Functional Ability  Outcome: Ongoing, Progressing  Intervention: Manage Persistent Pain  Recent Flowsheet Documentation  Taken 11/14/2022 0113 by Meenakshi Kent RN  Sleep/Rest Enhancement: awakenings minimized  Medication Review/Management: medications reviewed  Taken 11/13/2022 2300 by Meenakshi Kent RN  Medication Review/Management: medications reviewed  Taken 11/13/2022 2200 by Meenakshi Kent RN  Sleep/Rest Enhancement: awakenings minimized  Medication Review/Management: medications reviewed  Taken 11/13/2022 2100 by Meenakshi Kent RN  Medication Review/Management: medications reviewed  Taken 11/13/2022 1900 by Meenakshi Kent RN  Medication Review/Management: medications reviewed  Intervention: Optimize Psychosocial Wellbeing  Recent Flowsheet Documentation  Taken 11/14/2022 0113 by Meenakshi Kent RN  Supportive Measures: active listening utilized  Family/Support System Care: support provided  Taken 11/13/2022 2200 by Meenakshi Kent RN  Supportive Measures: active listening utilized  Family/Support System Care: self-care encouraged     Problem: Adult Inpatient  Plan of Care  Goal: Plan of Care Review  Outcome: Ongoing, Progressing  Flowsheets (Taken 11/14/2022 0115)  Progress: improving  Plan of Care Reviewed With: patient  Outcome Evaluation: Pt. remains confused. RA. VSS. Purewick in place. NS infusing @ 75mL/hr. Pt. resting comfortably at this time. Bed alarm set, call light within reach.  Goal: Patient-Specific Goal (Individualized)  Outcome: Ongoing, Progressing  Goal: Absence of Hospital-Acquired Illness or Injury  Outcome: Ongoing, Progressing  Intervention: Identify and Manage Fall Risk  Recent Flowsheet Documentation  Taken 11/14/2022 0113 by Meenakshi Kent RN  Safety Promotion/Fall Prevention:   activity supervised   fall prevention program maintained   safety round/check completed  Taken 11/13/2022 2300 by Meenakshi Kent RN  Safety Promotion/Fall Prevention:   activity supervised   fall prevention program maintained   safety round/check completed  Taken 11/13/2022 2200 by Meenakshi Kent RN  Safety Promotion/Fall Prevention:   activity supervised   fall prevention program maintained   safety round/check completed  Taken 11/13/2022 2100 by Meenakshi Kent RN  Safety Promotion/Fall Prevention:   activity supervised   fall prevention program maintained   safety round/check completed  Taken 11/13/2022 1900 by Meenakshi Kent RN  Safety Promotion/Fall Prevention:   activity supervised   fall prevention program maintained   safety round/check completed  Intervention: Prevent Skin Injury  Recent Flowsheet Documentation  Taken 11/14/2022 0113 by Meenakshi Kent RN  Skin Protection:   adhesive use limited   incontinence pads utilized   pulse oximeter probe site changed   transparent dressing maintained   tubing/devices free from skin contact  Taken 11/13/2022 2200 by Meenakshi Kent RN  Skin Protection:   adhesive use limited   incontinence pads utilized   pulse oximeter probe site changed   transparent dressing maintained   tubing/devices free from skin  contact  Intervention: Prevent and Manage VTE (Venous Thromboembolism) Risk  Recent Flowsheet Documentation  Taken 11/14/2022 0113 by Meenakshi Kent RN  Activity Management: activity adjusted per tolerance  VTE Prevention/Management: (heparin sq) other (see comments)  Taken 11/13/2022 2200 by Meenakshi Kent RN  Activity Management: activity adjusted per tolerance  VTE Prevention/Management: (heparin sq) other (see comments)  Intervention: Prevent Infection  Recent Flowsheet Documentation  Taken 11/13/2022 2300 by Meenakshi Kent RN  Infection Prevention:   single patient room provided   rest/sleep promoted   personal protective equipment utilized   hand hygiene promoted   equipment surfaces disinfected  Taken 11/13/2022 2100 by Meenakshi Kent RN  Infection Prevention:   single patient room provided   rest/sleep promoted   personal protective equipment utilized   hand hygiene promoted   equipment surfaces disinfected  Taken 11/13/2022 1900 by Meenakshi Kent RN  Infection Prevention:   single patient room provided   rest/sleep promoted   personal protective equipment utilized   hand hygiene promoted   equipment surfaces disinfected  Goal: Optimal Comfort and Wellbeing  Outcome: Ongoing, Progressing  Intervention: Provide Person-Centered Care  Recent Flowsheet Documentation  Taken 11/14/2022 0113 by Meenakshi Kent RN  Trust Relationship/Rapport:   care explained   choices provided   emotional support provided  Taken 11/13/2022 2200 by Meenakshi Kent RN  Trust Relationship/Rapport:   care explained   choices provided   emotional support provided   thoughts/feelings acknowledged  Goal: Readiness for Transition of Care  Outcome: Ongoing, Progressing

## 2022-11-15 LAB
ANION GAP SERPL CALCULATED.3IONS-SCNC: 11.1 MMOL/L (ref 5–15)
BASOPHILS # BLD AUTO: 0.08 10*3/MM3 (ref 0–0.2)
BASOPHILS NFR BLD AUTO: 0.9 % (ref 0–1.5)
BUN SERPL-MCNC: 9 MG/DL (ref 8–23)
BUN/CREAT SERPL: 20.9 (ref 7–25)
CALCIUM SPEC-SCNC: 8.2 MG/DL (ref 8.2–9.6)
CHLORIDE SERPL-SCNC: 104 MMOL/L (ref 98–107)
CO2 SERPL-SCNC: 18.9 MMOL/L (ref 22–29)
CREAT SERPL-MCNC: 0.43 MG/DL (ref 0.57–1)
DEPRECATED RDW RBC AUTO: 52.8 FL (ref 37–54)
EGFRCR SERPLBLD CKD-EPI 2021: 91.4 ML/MIN/1.73
EOSINOPHIL # BLD AUTO: 0.55 10*3/MM3 (ref 0–0.4)
EOSINOPHIL NFR BLD AUTO: 6.1 % (ref 0.3–6.2)
ERYTHROCYTE [DISTWIDTH] IN BLOOD BY AUTOMATED COUNT: 15.9 % (ref 12.3–15.4)
GLUCOSE SERPL-MCNC: 88 MG/DL (ref 65–99)
HCT VFR BLD AUTO: 33.2 % (ref 34–46.6)
HGB BLD-MCNC: 10.9 G/DL (ref 12–15.9)
IMM GRANULOCYTES # BLD AUTO: 0.06 10*3/MM3 (ref 0–0.05)
IMM GRANULOCYTES NFR BLD AUTO: 0.7 % (ref 0–0.5)
LYMPHOCYTES # BLD AUTO: 2.26 10*3/MM3 (ref 0.7–3.1)
LYMPHOCYTES NFR BLD AUTO: 25.2 % (ref 19.6–45.3)
MCH RBC QN AUTO: 29.7 PG (ref 26.6–33)
MCHC RBC AUTO-ENTMCNC: 32.8 G/DL (ref 31.5–35.7)
MCV RBC AUTO: 90.5 FL (ref 79–97)
MONOCYTES # BLD AUTO: 0.87 10*3/MM3 (ref 0.1–0.9)
MONOCYTES NFR BLD AUTO: 9.7 % (ref 5–12)
NEUTROPHILS NFR BLD AUTO: 5.15 10*3/MM3 (ref 1.7–7)
NEUTROPHILS NFR BLD AUTO: 57.4 % (ref 42.7–76)
NRBC BLD AUTO-RTO: 0 /100 WBC (ref 0–0.2)
PLATELET # BLD AUTO: 256 10*3/MM3 (ref 140–450)
PMV BLD AUTO: 10.8 FL (ref 6–12)
POTASSIUM SERPL-SCNC: 3.2 MMOL/L (ref 3.5–5.2)
POTASSIUM SERPL-SCNC: 3.7 MMOL/L (ref 3.5–5.2)
RBC # BLD AUTO: 3.67 10*6/MM3 (ref 3.77–5.28)
SARS-COV-2 AG RESP QL IA.RAPID: NORMAL
SODIUM SERPL-SCNC: 134 MMOL/L (ref 136–145)
WBC NRBC COR # BLD: 8.97 10*3/MM3 (ref 3.4–10.8)

## 2022-11-15 PROCEDURE — 25010000002 ENOXAPARIN PER 10 MG: Performed by: STUDENT IN AN ORGANIZED HEALTH CARE EDUCATION/TRAINING PROGRAM

## 2022-11-15 PROCEDURE — 97166 OT EVAL MOD COMPLEX 45 MIN: CPT

## 2022-11-15 PROCEDURE — 84132 ASSAY OF SERUM POTASSIUM: CPT | Performed by: STUDENT IN AN ORGANIZED HEALTH CARE EDUCATION/TRAINING PROGRAM

## 2022-11-15 PROCEDURE — 99232 SBSQ HOSP IP/OBS MODERATE 35: CPT | Performed by: STUDENT IN AN ORGANIZED HEALTH CARE EDUCATION/TRAINING PROGRAM

## 2022-11-15 PROCEDURE — 97162 PT EVAL MOD COMPLEX 30 MIN: CPT

## 2022-11-15 PROCEDURE — 94799 UNLISTED PULMONARY SVC/PX: CPT

## 2022-11-15 PROCEDURE — 87426 SARSCOV CORONAVIRUS AG IA: CPT | Performed by: STUDENT IN AN ORGANIZED HEALTH CARE EDUCATION/TRAINING PROGRAM

## 2022-11-15 PROCEDURE — 85025 COMPLETE CBC W/AUTO DIFF WBC: CPT | Performed by: STUDENT IN AN ORGANIZED HEALTH CARE EDUCATION/TRAINING PROGRAM

## 2022-11-15 PROCEDURE — 80048 BASIC METABOLIC PNL TOTAL CA: CPT | Performed by: STUDENT IN AN ORGANIZED HEALTH CARE EDUCATION/TRAINING PROGRAM

## 2022-11-15 RX ORDER — POTASSIUM CHLORIDE 20 MEQ/1
40 TABLET, EXTENDED RELEASE ORAL EVERY 4 HOURS
Status: COMPLETED | OUTPATIENT
Start: 2022-11-15 | End: 2022-11-15

## 2022-11-15 RX ORDER — CEFDINIR 300 MG/1
300 CAPSULE ORAL EVERY 12 HOURS SCHEDULED
Status: DISCONTINUED | OUTPATIENT
Start: 2022-11-15 | End: 2022-11-15

## 2022-11-15 RX ORDER — OLANZAPINE 5 MG/1
5 TABLET, ORALLY DISINTEGRATING ORAL NIGHTLY
Status: DISCONTINUED | OUTPATIENT
Start: 2022-11-15 | End: 2022-11-15

## 2022-11-15 RX ORDER — OLANZAPINE 5 MG/1
5 TABLET, ORALLY DISINTEGRATING ORAL 2 TIMES DAILY
Status: DISCONTINUED | OUTPATIENT
Start: 2022-11-15 | End: 2022-11-16 | Stop reason: HOSPADM

## 2022-11-15 RX ORDER — DOXYCYCLINE 100 MG/1
100 CAPSULE ORAL EVERY 12 HOURS SCHEDULED
Status: DISCONTINUED | OUTPATIENT
Start: 2022-11-15 | End: 2022-11-16 | Stop reason: HOSPADM

## 2022-11-15 RX ORDER — LEVOFLOXACIN 750 MG/1
750 TABLET ORAL EVERY 24 HOURS
Status: DISCONTINUED | OUTPATIENT
Start: 2022-11-15 | End: 2022-11-16 | Stop reason: HOSPADM

## 2022-11-15 RX ADMIN — SERTRALINE 50 MG: 50 TABLET, FILM COATED ORAL at 20:36

## 2022-11-15 RX ADMIN — DOXYCYCLINE 100 MG: 100 CAPSULE ORAL at 20:36

## 2022-11-15 RX ADMIN — CLONAZEPAM 0.25 MG: 0.5 TABLET ORAL at 23:56

## 2022-11-15 RX ADMIN — OLANZAPINE 5 MG: 5 TABLET, ORALLY DISINTEGRATING ORAL at 20:36

## 2022-11-15 RX ADMIN — DOCUSATE SODIUM 200 MG: 100 CAPSULE ORAL at 08:56

## 2022-11-15 RX ADMIN — DIVALPROEX SODIUM 250 MG: 250 TABLET, DELAYED RELEASE ORAL at 20:36

## 2022-11-15 RX ADMIN — Medication 1 TABLET: at 08:56

## 2022-11-15 RX ADMIN — POTASSIUM CHLORIDE 40 MEQ: 20 TABLET, EXTENDED RELEASE ORAL at 06:00

## 2022-11-15 RX ADMIN — OLANZAPINE 5 MG: 5 TABLET, ORALLY DISINTEGRATING ORAL at 11:48

## 2022-11-15 RX ADMIN — PANTOPRAZOLE SODIUM 40 MG: 40 TABLET, DELAYED RELEASE ORAL at 08:56

## 2022-11-15 RX ADMIN — ENOXAPARIN SODIUM 40 MG: 40 INJECTION SUBCUTANEOUS at 20:36

## 2022-11-15 RX ADMIN — CARBIDOPA AND LEVODOPA 1 TABLET: 10; 100 TABLET ORAL at 20:36

## 2022-11-15 RX ADMIN — ASPIRIN 81 MG: 81 TABLET, COATED ORAL at 08:56

## 2022-11-15 RX ADMIN — SENNOSIDES 1 TABLET: 8.6 TABLET, FILM COATED ORAL at 08:56

## 2022-11-15 RX ADMIN — LEVOTHYROXINE SODIUM 50 MCG: 50 TABLET ORAL at 08:56

## 2022-11-15 RX ADMIN — CETIRIZINE HYDROCHLORIDE 10 MG: 10 TABLET, FILM COATED ORAL at 08:56

## 2022-11-15 RX ADMIN — CLOPIDOGREL 75 MG: 75 TABLET, FILM COATED ORAL at 08:56

## 2022-11-15 RX ADMIN — CLONAZEPAM 0.25 MG: 0.5 TABLET ORAL at 12:59

## 2022-11-15 RX ADMIN — OLANZAPINE 5 MG: 5 TABLET, ORALLY DISINTEGRATING ORAL at 00:19

## 2022-11-15 RX ADMIN — CARBIDOPA AND LEVODOPA 1 TABLET: 10; 100 TABLET ORAL at 08:56

## 2022-11-15 RX ADMIN — LEVOFLOXACIN 750 MG: 750 TABLET, FILM COATED ORAL at 15:16

## 2022-11-15 RX ADMIN — Medication 5 MG: at 20:36

## 2022-11-15 RX ADMIN — POTASSIUM CHLORIDE 40 MEQ: 20 TABLET, EXTENDED RELEASE ORAL at 08:56

## 2022-11-15 NOTE — PROGRESS NOTES
"Palliative Care Daily Progress Note     S: Medical record reviewed, followed up with Primary Dr Mendez regarding patient's condition. When palliative entered the room and ppt was resting quietly at this time, I did not try and wake her due to previously difficulty with restlessness/agitation.      O:   Palliative Performance Scale Score:     /85 (BP Location: Right arm, Patient Position: Lying)   Pulse 69   Temp 97.2 °F (36.2 °C) (Oral)   Resp 20   Ht 154.9 cm (61\")   Wt 48 kg (105 lb 14.4 oz)   SpO2 95%   BMI 20.01 kg/m²     Intake/Output Summary (Last 24 hours) at 11/15/2022 1652  Last data filed at 11/15/2022 0905  Gross per 24 hour   Intake 240 ml   Output --   Net 240 ml                                                       MEDs: Reviewed and changes noted.    Labs:   Results from last 7 days   Lab Units 11/15/22  0203   WBC 10*3/mm3 8.97   HEMOGLOBIN g/dL 10.9*   HEMATOCRIT % 33.2*   PLATELETS 10*3/mm3 256     Results from last 7 days   Lab Units 11/15/22  1336 11/15/22  0203   SODIUM mmol/L  --  134*   POTASSIUM mmol/L 3.7 3.2*   CHLORIDE mmol/L  --  104   CO2 mmol/L  --  18.9*   BUN mg/dL  --  9   CREATININE mg/dL  --  0.43*   GLUCOSE mg/dL  --  88   CALCIUM mg/dL  --  8.2     Results from last 7 days   Lab Units 11/15/22  1336 11/15/22  0203 11/12/22 2053 11/12/22  0347   SODIUM mmol/L  --  134*   < > 142   POTASSIUM mmol/L 3.7 3.2*   < > 3.4*   CHLORIDE mmol/L  --  104   < > 111*   CO2 mmol/L  --  18.9*   < > 19.0*   BUN mg/dL  --  9   < > 37*   CREATININE mg/dL  --  0.43*   < > 0.99   CALCIUM mg/dL  --  8.2   < > 8.0*   BILIRUBIN mg/dL  --   --   --  0.2   ALK PHOS U/L  --   --   --  67   ALT (SGPT) U/L  --   --   --  10   AST (SGOT) U/L  --   --   --  22   GLUCOSE mg/dL  --  88   < > 156*    < > = values in this interval not displayed.     Imaging Results (Last 72 Hours)     Procedure Component Value Units Date/Time    XR Wrist 3+ View Left [[id number]] Collected: 11/13/22 1805     " Updated: 11/13/22 1807    Narrative:      CR Wrist Min 3 Vws LT    INDICATION:   Left wrist bruising    COMPARISON:   None available.    FINDINGS:   AP, lateral, and oblique views of the left wrist.      Degenerative changes of the radiocarpal, carpal, and carpal metacarpal joints. No acute fracture or dislocation. No radiopaque foreign body.      Impression:      Degenerative changes of the wrist and carpal joints. No acute fracture or dislocation.    Signer Name: Pedro Jerome MD   Signed: 11/13/2022 6:05 PM   Workstation Name: [id number]    [facility]            Diagnostics: Reviewed    A: Marilyn Sood is a 89 y.o.  female with altered mental status and has a medical history of ascending aortic aneurysm, coronary artery disease status post three-vessel CABG and stenting, hyperlipidemia, essential hypertension, irritable bowel syndrome, osteoarthritis, Parkinson's disease, dementia, former tobacco abuse who presents to [facility] emergency department with complaints of altered mental status.   Today 11/15/2022  T 97.2, HR 69, RR 20 and BP of 140/85 and was saturating 95% on room air.    P:  I was able to speak with patients daughter Eri on the phone to provide supports and to ensure she did not have any questions.  She stated that she did not that everyone has been very nice and informative. Plan is for pt to go to [facility].      We will continue to follow along. Please do not hesitate to contact us regarding further sx mgmt or GOC needs, including after hours or on weekends via our on call provider at [phone].     Miriam Bullock, APRN    11/15/2022

## 2022-11-15 NOTE — PLAN OF CARE
Goal Outcome Evaluation:  Plan of Care Reviewed With: patient           Outcome Evaluation: Pt anxious, confused, restless throughout shift, recieved scheduled seroquel, and PRN klonopin per orders. Pt still remained awake and restless, pulled out IV. New IV access was attempted and unsuccessful, pt refused to try again, daughter at bedside also refused us to try again and asked to try in the morning. Pt has been awake most of the night. Pt recieved PRN Zyprexa per orders. Pt has been attempting to get up out of bed, reoriented back into bed. Pt now resting at this time. VSS. Will continue with plan of care.

## 2022-11-15 NOTE — CASE MANAGEMENT/SOCIAL WORK
Discharge Planning Assessment  [facility]     Patient Name: Marilyn Sood  MRN: [id number]  Today's Date: 11/15/2022    Admit Date: 11/11/2022       Discharge Plan     Row Name 11/15/22 1021       Plan    Plan SS left a message for Maia at [facility] this morning. SS to follow.    13:34: SS attempted contact with Maia at [facility] and left a message with Martita. SS discussed pt with Dr. Mendez and pt is stable for discharge. SS to follow.    14:25: SS received a call from Maia at [facility] and insurance is being verified. SS to follow.    15:22: SS contacted [facility] per Ann and pt has been approved for admission tomorrow. [facility] request a Covid test be completed prior to discharge. SS notified Dr. Mendez and daughter, Eri. SS to follow.              Continued Care and Services - Admitted Since 11/11/2022     Destination     Service Provider Request Status Selected Services Address Phone Fax Patient Preferred    [facility] Pending - No Request Sent N/A [address] [id number] [phone] --              Expected Discharge Date and Time     Expected Discharge Date Expected Discharge Time    Nov 15, 2022         JI Hurd

## 2022-11-15 NOTE — THERAPY EVALUATION
Acute Care - Physical Therapy Initial Evaluation   Miguel Angel     Patient Name: Marilyn Sood  : 1930  MRN: [id number]  Today's Date: 11/15/2022   Onset of Illness/Injury or Date of Surgery: 11/15/22  Visit Dx:     ICD-10-CM ICD-9-CM   1. Sepsis, due to unspecified organism, unspecified whether acute organ dysfunction present ([facility])  A41.9 038.9     995.91   2. Pneumonia of left lower lobe due to infectious organism  J18.9 486   3. Acute UTI  N39.0 599.0     Patient Active Problem List   Diagnosis   • IHD (ischemic heart disease)   • Hypertension   • Dyslipidemia   • Osteoarthritis   • Osteoporosis   • GERD with esophagitis   • Gastroparesis   • IBS (irritable bowel syndrome)   • Parkinson's disease (HCC)   • Colonic polyp   • Peripheral edema   • Dementia (HCC)   • Chronic hypokalemia   • Osteoarthritis of left hip   • Syncope   • Ascending aortic aneurysm   • Ischemic heart disease   • Mixed hyperlipidemia   • Hypertensive urgency   • Acute deep vein thrombosis (DVT) of distal vein of left lower extremity ([facility])   • Lower extremity edema   • Severe sepsis (HCC)     Past Medical History:   Diagnosis Date   • Arthritis    • Ascending aortic aneurysm 10/6/2016   • Chronic hypokalemia 10/6/2016   • Colonic polyp 10/6/2016   • Dementia (HCC)    • Dyslipidemia 10/6/2016   • Gastroparesis 10/6/2016   • GERD with esophagitis 10/6/2016   • H/O blood clots    • Hypertension 10/6/2016   • IBS (irritable bowel syndrome) 10/6/2016   • IHD (ischemic heart disease) 10/6/2016   • Osteoarthritis 10/6/2016   • Osteoarthritis of left hip 10/6/2016   • Osteoporosis 10/6/2016   • Parkinson's disease (HCC) 10/6/2016   • Peripheral edema 10/6/2016   • Syncope 10/6/2016     Past Surgical History:   Procedure Laterality Date   • APPENDECTOMY      As a child   • CATARACT EXTRACTION WITH INTRAOCULAR LENS IMPLANT Bilateral     ,   • CORONARY ANGIOPLASTY WITH STENT PLACEMENT     • CORONARY ARTERY BYPASS GRAFT  2000    x3    • GASTRECTOMY PARTIAL / TOTAL      1970s   • LAPAROSCOPIC CHOLECYSTECTOMY  2003   • SPLENECTOMY  2007   • TONSILLECTOMY AND ADENOIDECTOMY      As a child   • TOTAL ABDOMINAL HYSTERECTOMY WITH SALPINGO OOPHORECTOMY  1973     PT Assessment (last 12 hours)     PT Evaluation and Treatment     Row Name 11/15/22 1000          Physical Therapy Time and Intention    Document Type evaluation  -     Mode of Treatment physical therapy  -     Patient Effort good  -     Comment Patient is very hard of hearing but does respond to one-step commands 50-75% of the time  -     Row Name 11/15/22 1000          General Information    Patient Profile Reviewed yes  -     Onset of Illness/Injury or Date of Surgery 11/15/22  -     Referring Physician Andrea  [facility]     Patient Observations cooperative  -     Patient/Family/Caregiver Comments/Observations Pt's granddaughter entered room during evaluation and some history was obtained from her  -     General Observations of Patient Patient presents with wrist brace doffed.  OT placed wrist brace on patient prior to mobility.  -     Prior Level of Function min assist:;transfer;bed mobility  Pt has not ambulated in a while and was using w/c prior to this admission  -     Equipment Currently Used at Home wheelchair  -     Existing Precautions/Restrictions fall;non-weight bearing;other (see comments);brace on at all times  NWB right wrist with brace donned  -     Limitations/Impairments safety/cognitive  -     Row Name 11/15/22 1000          Previous Level of Function/Home Environm    Bed Mobility, Premorbid Functional Level partial assistance  -     Transfers, Premorbid Functional Level partial assistance  -     Household Ambulation, Premorbid Functional Level uses wheelchair  -     Row Name 11/15/22 1000          Living Environment    Current Living Arrangements residential facility  -     People in Home facility resident  -     Row Name 11/15/22 1000           Pain    Pretreatment Pain Rating 0/10 - no pain  -     Posttreatment Pain Rating 0/10 - no pain  -KP     Pre/Posttreatment Pain Comment Pt reported no pain  -[facility] Name 11/15/22 1000          Cognition    Affect/Mental Status (Cognition) confused  -     Follows Commands (Cognition) follows one-step commands;50-74% accuracy;other (see comments)  Pt is also very hard of hearing  -     Personal Safety Interventions fall prevention program maintained;gait belt;muscle strengthening facilitated;nonskid shoes/slippers when out of bed;supervised activity  -     Row Name 11/15/22 1000          Range of Motion Comprehensive    Comment, General Range of Motion BLE WFL  -     Row Name 11/15/22 1000          Strength Comprehensive (MMT)    Comment, General Manual Muscle Testing (MMT) Assessment Strength observed 2-/5 during functional mobility  -     Row Name 11/15/22 1000          Mobility    Extremity Weight-bearing Status right upper extremity  -     Right Upper Extremity (Weight-bearing Status) non weight-bearing (NWB)  through wrist  [facility]     Row Name 11/15/22 1000          Bed Mobility    Bed Mobility supine-sit;sit-supine  -     Supine-Sit [place] (Bed Mobility) maximum assist (25% patient effort);2 person assist  -     Sit-Supine [place] (Bed Mobility) maximum assist (25% patient effort);2 person assist  -     Bed Mobility, Safety Issues decreased use of arms for pushing/pulling;decreased use of legs for bridging/pushing;cognitive deficits limit understanding  -     Assistive Device (Bed Mobility) bed rails;draw sheet;head of bed elevated  -     Row Name 11/15/22 1000          Transfers    Transfers sit-stand transfer;stand-sit transfer  -     Maintains Weight-bearing Status (Transfers) verbal cues to maintain;physical assist to maintain;other (see comments)  to maintain NWB through R wrist  -     Row Name 11/15/22 1000          Sit-Stand Transfer    Sit-Stand [place]  (Transfers) maximum assist (25% patient effort);2 person assist;verbal cues  -     Assistive Device (Sit-Stand Transfers) other (see comments)  HHA without WB through right wrist  -     Row Name 11/15/22 1000          Stand-Sit Transfer    Stand-Sit [place] (Transfers) maximum assist (25% patient effort);2 person assist  -[facility] Name 11/15/22 1000          Gait/Stairs (Locomotion)    Comment, (Gait/Stairs) Pt unable to ambulate and was non-ambulatory prior to this admission.  She has posterior lean in standing with inability to fully stand upright.  -     Row Name 11/15/22 1000          Safety Issues, Functional Mobility    Safety Issues Affecting Function (Mobility) ability to follow commands;at risk behavior observed;awareness of need for assistance;insight into deficits/self-awareness;judgment;problem-solving;safety precaution awareness;sequencing abilities  -     Impairments Affecting Function (Mobility) balance;cognition;coordination;endurance/activity tolerance;motor control;motor planning;postural/trunk control;strength  -[facility] Name 11/15/22 1000          Balance    Balance Assessment sitting static balance  -     Static Sitting Balance standby assist;contact guard;1-person assist  -     Position, Sitting Balance sitting edge of bed  -[facility] Name 11/15/22 1000          Plan of Care Review    Plan of Care Reviewed With patient;grandchild(hayden)  -     Outcome Evaluation PT evaluation complete.  Granddaughter was present during evaluation and provided some social history, including that patient is very hard of hearing.  She is assumed NWB to right wrist with wrist splint due to previous fracture.  She was able to perform sit to stand transfer with maxAx2 but presents with posterior lean and inability to take steps.  She will benefit from continued skilled PT this admission to improve strength, endurance and overall functional mobility.  D/c recommendation is return to SNF.  -      Row Name 11/15/22 1000          Positioning and Restraints    Pre-Treatment Position in bed  -KP     Post Treatment Position bed  -KP     In Bed supine;call light within reach;encouraged to call for assist;exit alarm on;side rails up x3  Lines in tact and needs in reach.  R wrist splint donned.  -     Row Name 11/15/22 1000          Therapy Assessment/Plan (PT)    Patient/Family Therapy Goals Statement (PT) Family would like patient to continue to work with therapy as she is able.  -     Functional Level at Time of Evaluation (PT) maxAx2  -     PT Diagnosis (PT) Decreased strength, endurance, balance  -     Rehab Potential (PT) fair, will monitor progress closely  -     Criteria for Skilled Interventions Met (PT) yes;meets criteria;skilled treatment is necessary  -     Therapy Frequency (PT) 2 times/wk  -     Predicted Duration of Therapy Intervention (PT) 2 wks  -     Problem List (PT) balance;problems related to;cognition;coordination;mobility;motor control;strength;postural control  -     Activity Limitations Related to Problem List (PT) unable to ambulate safely;unable to transfer safely  -     Row Name 11/15/22 1000          PT Evaluation Complexity    History, PT Evaluation Complexity 3 or more personal factors and/or comorbidities  -     Examination of Body Systems (PT Eval Complexity) total of 3 or more elements  -     Clinical Presentation (PT Evaluation Complexity) evolving  -     Clinical Decision Making (PT Evaluation Complexity) moderate complexity  -     Overall Complexity (PT Evaluation Complexity) moderate complexity  -     Row Name 11/15/22 1000          Physical Therapy Goals    Bed Mobility Goal Selection (PT) bed mobility, PT goal 1  -     Transfer Goal Selection (PT) transfer, PT goal 1  -     Gait Training Goal Selection (PT) gait training, PT goal 1  -     Row Name 11/15/22 1000          Bed Mobility Goal 1 (PT)    Activity/Assistive Device (Bed Mobility  Goal 1, PT) sit to supine/supine to sit  -     [place] Level/Cues Needed (Bed Mobility Goal 1, PT) minimum assist (75% or more patient effort)  -     Time Frame (Bed Mobility Goal 1, PT) long term goal (LTG);by discharge  -     Row Name 11/15/22 1000          Transfer Goal 1 (PT)    Activity/Assistive Device (Transfer Goal 1, PT) sit-to-stand/stand-to-sit  -     [place] Level/Cues Needed (Transfer Goal 1, PT) moderate assist (50-74% patient effort)  -     Time Frame (Transfer Goal 1, PT) long term goal (LTG);by discharge  -           User Key  (r) = Recorded By, (t) = Taken By, (c) = Cosigned By    Initials Name Provider Type    Ngozi Jeter, PT Physical Therapist                  PT Recommendation and Plan  Anticipated Discharge Disposition (PT): skilled nursing facility, sub acute care setting  Planned Therapy Interventions (PT): balance training, bed mobility training, gait training, home exercise program, neuromuscular re-education, patient/family education, postural re-education, motor coordination training, stair training, strengthening, stretching, transfer training, wheelchair management/propulsion training  Therapy Frequency (PT): 2 times/wk  Plan of Care Reviewed With: patient, grandchild(hayden)  Outcome Evaluation: PT evaluation complete.  Granddaughter was present during evaluation and provided some social history, including that patient is very hard of hearing.  She is assumed NWB to right wrist with wrist splint due to previous fracture.  She was able to perform sit to stand transfer with maxAx2 but presents with posterior lean and inability to take steps.  She will benefit from continued skilled PT this admission to improve strength, endurance and overall functional mobility.  D/c recommendation is return to SNF.       Time Calculation:    PT Charges     Row Name 11/15/22 1314             Time Calculation    Start Time 1000  -KP      PT Received On 11/15/22  -      PT Goal  Re-Cert Due Date 11/29/22  -            User Key  (r) = Recorded By, (t) = Taken By, (c) = Cosigned By    Initials Name Provider Type    Ngozi Jeter, PT Physical Therapist              Therapy Charges for Today     Code Description Service Date Service Provider Modifiers Qty    31811775404 HC PT EVAL MOD COMPLEXITY 4 11/15/2022 Ngozi Marrufo, PT GP 1          PT G-Codes  AM-PAC 6 Clicks Score (PT): 6    Ngozi Marrufo PT  11/15/2022

## 2022-11-15 NOTE — PLAN OF CARE
Goal Outcome Evaluation:  Plan of Care Reviewed With: patient           Outcome Evaluation: Pt alert and confused. Pt agitated this shift and wanting to leave. PRN med given. Pt stood with PT. Family at bedside. Will continue plan of care.

## 2022-11-15 NOTE — THERAPY EVALUATION
Patient Name: Marilyn Sood  : 1930    MRN: [id number]                              Today's Date: 11/15/2022       Admit Date: 2022    Visit Dx:     ICD-10-CM ICD-9-CM   1. Sepsis, due to unspecified organism, unspecified whether acute organ dysfunction present (HCC)  A41.9 038.9     995.91   2. Pneumonia of left lower lobe due to infectious organism  J18.9 486   3. Acute UTI  N39.0 599.0     Patient Active Problem List   Diagnosis   • IHD (ischemic heart disease)   • Hypertension   • Dyslipidemia   • Osteoarthritis   • Osteoporosis   • GERD with esophagitis   • Gastroparesis   • IBS (irritable bowel syndrome)   • Parkinson's disease (HCC)   • Colonic polyp   • Peripheral edema   • Dementia (HCC)   • Chronic hypokalemia   • Osteoarthritis of left hip   • Syncope   • Ascending aortic aneurysm   • Ischemic heart disease   • Mixed hyperlipidemia   • Hypertensive urgency   • Acute deep vein thrombosis (DVT) of distal vein of left lower extremity ([facility])   • Lower extremity edema   • Severe sepsis (HCC)     Past Medical History:   Diagnosis Date   • Arthritis    • Ascending aortic aneurysm 10/6/2016   • Chronic hypokalemia 10/6/2016   • Colonic polyp 10/6/2016   • Dementia (HCC)    • Dyslipidemia 10/6/2016   • Gastroparesis 10/6/2016   • GERD with esophagitis 10/6/2016   • H/O blood clots    • Hypertension 10/6/2016   • IBS (irritable bowel syndrome) 10/6/2016   • IHD (ischemic heart disease) 10/6/2016   • Osteoarthritis 10/6/2016   • Osteoarthritis of left hip 10/6/2016   • Osteoporosis 10/6/2016   • Parkinson's disease (HCC) 10/6/2016   • Peripheral edema 10/6/2016   • Syncope 10/6/2016     Past Surgical History:   Procedure Laterality Date   • APPENDECTOMY      As a child   • CATARACT EXTRACTION WITH INTRAOCULAR LENS IMPLANT Bilateral     ,   • CORONARY ANGIOPLASTY WITH STENT PLACEMENT     • CORONARY ARTERY BYPASS GRAFT  2000    x3   • GASTRECTOMY PARTIAL / TOTAL      1970s   • LAPAROSCOPIC  CHOLECYSTECTOMY  2003   • SPLENECTOMY  2007   • TONSILLECTOMY AND ADENOIDECTOMY      As a child   • TOTAL ABDOMINAL HYSTERECTOMY WITH SALPINGO OOPHORECTOMY  1973      General Information     Row Name 11/15/22 1311          OT Time and Intention    Document Type evaluation  -     Mode of Treatment occupational therapy  -     Row Name 11/15/22 1311          General Information    Patient Profile Reviewed yes  -LM     Prior Level of Function max assist:;dependent:;transfer;ADL's;all household mobility  progressive fxl decline over past 6 months  -LM     Existing Precautions/Restrictions fall  -LM     Barriers to Rehab cognitive status;previous functional deficit;medically complex  -LM     Row Name 11/15/22 1311          Living Environment    People in Home facility resident  -LM     Row Name 11/15/22 1311          Cognition    Orientation Status (Cognition) oriented to;person;verbal cues/prompts needed for orientation;unable/difficult to assess  -LM     Row Name 11/15/22 1311          Safety Issues, Functional Mobility    Safety Issues Affecting Function (Mobility) judgment;insight into deficits/self-awareness;safety precaution awareness;problem-solving;sequencing abilities;safety precautions follow-through/compliance  -LM     Impairments Affecting Function (Mobility) balance;cognition;endurance/activity tolerance;strength  -LM     Cognitive Impairments, Mobility Safety/Performance attention;insight into deficits/self-awareness;judgment;problem-solving/reasoning  -LM           User Key  (r) = Recorded By, (t) = Taken By, (c) = Cosigned By    Initials Name Provider Type    LM Ynes Ness, OT Occupational Therapist                 Mobility/ADL's     Row Name 11/15/22 1318          Activities of Daily Living    BADL Assessment/Intervention bathing;upper body dressing;lower body dressing;grooming;feeding;toileting  -     Row Name 11/15/22 1318          Bathing Assessment/Intervention    [place] Level  (Bathing) dependent (less than 25% patient effort)  -LM     Row Name 11/15/22 1318          Upper Body Dressing Assessment/Training    [place] Level (Upper Body Dressing) dependent (less than 25% patient effort)  -LM     Row Name 11/15/22 1318          Lower Body Dressing Assessment/Training    [place] Level (Lower Body Dressing) dependent (less than 25% patient effort)  -LM     [facility] Name 11/15/22 1318          Grooming Assessment/Training    [place] Level (Grooming) dependent (less than 25% patient effort)  -LM     [facility] Name 11/15/22 1318          Self-Feeding Assessment/Training    [place] Level (Feeding) maximum assist (25% patient effort);dependent (less than 25% patient effort)  -LM     [facility] Name 11/15/22 1318          Toileting Assessment/Training    [place] Level (Toileting) dependent (less than 25% patient effort)  -LM           User Key  (r) = Recorded By, (t) = Taken By, (c) = Cosigned By    Initials Name Provider Type    LM Ynes Ness, OT Occupational Therapist               Obj/Interventions     [facility] Name 11/15/22 1319          Sensory Assessment (Somatosensory)    Sensory Assessment (Somatosensory) sensation intact  -LM     Row Name 11/15/22 1319          Vision Assessment/Intervention    Visual Impairment/Limitations WFL  -LM     Row Name 11/15/22 1319          Range of Motion Comprehensive    General Range of Motion upper extremity range of motion deficits identified  -LM     Comment, General Range of Motion Right wrist NWB in wrist cockup brace  -[facility] Name 11/15/22 1319          Strength Comprehensive (MMT)    General Manual Muscle Testing (MMT) Assessment upper extremity strength deficits identified  -LM     Comment, General Manual Muscle Testing (MMT) Assessment NWB right wrist, NT  -[facility] Name 11/15/22 1319          Motor Skills    Motor Skills functional endurance  -LM     Functional Endurance P+  -LM           User Key  (r) = Recorded By, (t) = Taken By, (c) =  Cosigned By    Initials Name Provider Type    LM Ynes Ness, OT Occupational Therapist               Goals/Plan     Row Name 11/15/22 1324          Transfer Goal 1 (OT)    Activity/Assistive Device (Transfer Goal 1, OT) transfers, all;commode, 3-in-1  -LM     [place] Level/Cues Needed (Transfer Goal 1, OT) moderate assist (50-74% patient effort)  -LM     Time Frame (Transfer Goal 1, OT) by discharge  -LM     Row Name 11/15/22 1324          Self-Feeding Goal 1 (OT)    Activity/Device (Self-Feeding Goal 1, OT) self-feeding skills, all  -LM     [place] Level/Cues Needed (Self-Feeding Goal 1, OT) supervision required;set-up required  -LM     Time Frame (Self-Feeding Goal 1, OT) by discharge  -LM     Row Name 11/15/22 1324          Therapy Assessment/Plan (OT)    Planned Therapy Interventions (OT) activity tolerance training;adaptive equipment training;BADL retraining;patient/caregiver education/training;transfer/mobility retraining;strengthening exercise;ROM/therapeutic exercise  -LM           User Key  (r) = Recorded By, (t) = Taken By, (c) = Cosigned By    Initials Name Provider Type    LM Ynes Ness, OT Occupational Therapist               Clinical Impression     Row Name 11/15/22 1322          Plan of Care Review    Plan of Care Reviewed With patient;grandchild(hayden)  [facility] providing history  -     Row Name 11/15/22 1322          Therapy Assessment/Plan (OT)    Patient/Family Therapy Goal Statement (OT) Family desires therapy services to maximize independence  -LM     Rehab Potential (OT) fair, will monitor progress closely  -LM     Criteria for Skilled Therapeutic Interventions Met (OT) yes;meets criteria;skilled treatment is necessary  -LM     Therapy Frequency (OT) other (see comments)  prn and/or to monitor fxl progress  -     Row Name 11/15/22 1322          Therapy Plan Review/Discharge Plan (OT)    Anticipated Discharge Disposition (OT) skilled nursing  facility  -     Row Name 11/15/22 1322          Positioning and Restraints    In Bed call light within reach;encouraged to call for assist;exit alarm on;with family/caregiver  -           User Key  (r) = Recorded By, (t) = Taken By, (c) = Cosigned By    Initials Name Provider Type     Ynes Ness, OT Occupational Therapist               Outcome Measures     Row Name 11/15/22 0905          How much help from another person do you currently need...    Turning from your back to your side while in flat bed without using bedrails? 1  -SJ     Moving from lying on back to sitting on the side of a flat bed without bedrails? 1  -SJ     Moving to and from a bed to a chair (including a wheelchair)? 1  -SJ     Standing up from a chair using your arms (e.g., wheelchair, bedside chair)? 1  -SJ     Climbing 3-5 steps with a railing? 1  -SJ     To walk in hospital room? 1  -SJ     AM-PAC 6 Clicks Score (PT) 6  -SJ     Highest level of mobility 2 --> Bed activities/dependent transfer  -           User Key  (r) = Recorded By, (t) = Taken By, (c) = Cosigned By    Initials Name Provider Type    Madison Whitten, RN Registered Nurse                  OT Recommendation and Plan  Planned Therapy Interventions (OT): activity tolerance training, adaptive equipment training, BADL retraining, patient/caregiver education/training, transfer/mobility retraining, strengthening exercise, ROM/therapeutic exercise  Therapy Frequency (OT): other (see comments) (prn and/or to monitor fxl progress)  Plan of Care Review  Plan of Care Reviewed With: patient, grandchild(hayden) ([facility] present and supportive providing history)     Time Calculation:     Therapy Charges for Today     Code Description Service Date Service Provider Modifiers Qty    [id number]  OT EVAL MOD COMPLEXITY 4 11/15/2022 Ynes Ness OT GO 1               Ynes Ness OT  11/15/2022

## 2022-11-15 NOTE — PROGRESS NOTES
[facility] HOSPITALIST PROGRESS NOTE     Patient Identification:  Name:  Marilyn Sood  Age:  89 y.o.  Sex:  female  :  1930  MRN:  [id number]  Visit Number:  [id number]  ROOM: [address] [place] [address]     Primary Care Provider:  Jarod Burnett MD    Length of stay in inpatient status:  4    Subjective     Chief Compliant:    Chief Complaint   Patient presents with   • Altered Mental Status       History of Presenting Illness:   Patient seen in follow-up for encephalopathy, pneumonia.  Patient is awake, alert to self but not place or time.  She has been intermittently agitated.  Received Seroquel, Klonopin without improvement last night but did do better after receiving Zyprexa.  Goals of care discussed with family at bedside who have chose to proceed with hospice at the nursing facility. No other adverse events noted.    Objective     Current Hospital Meds:aspirin, 81 mg, Oral, Daily  aztreonam, 2 g, Intravenous, Q8H  carbidopa-levodopa, 1 tablet, Oral, BID  cetirizine, 10 mg, Oral, Daily  clopidogrel, 75 mg, Oral, Daily  divalproex, 250 mg, Oral, Nightly  docusate sodium, 200 mg, Oral, Daily  enoxaparin, 40 mg, Subcutaneous, Nightly  levothyroxine, 50 mcg, Oral, Daily  multivitamin, 1 tablet, Oral, Daily  OLANZapine zydis, 5 mg, Oral, BID  pantoprazole, 40 mg, Oral, Daily  senna, 1 tablet, Oral, Daily  sertraline, 50 mg, Oral, Nightly  sodium chloride, 10 mL, Intravenous, Q12H         Current Antimicrobial Therapy:  Anti-Infectives (From admission, onward)    Ordered     Dose/Rate Route Frequency Start Stop    22 1259  aztreonam (AZACTAM) 2 g in sodium chloride 0.9 % 100 mL IVPB-VTB        Ordering Provider: Gareth Mendez DO    2 g  over 4 Hours Intravenous Every 8 Hours 22 1422 22 0629    22 1731  aztreonam (AZACTAM) 2 g in sodium chloride 0.9 % 100 mL IVPB-VTB        Ordering Provider: Ivy Villalpando PA    2 g  200 mL/hr over 30 Minutes Intravenous  Once 11/11/22 1733 11/11/22 1827 11/11/22 1731  vancomycin (VANCOCIN) 1,000 mg in sodium chloride 0.9 % 250 mL IVPB        Ordering Provider: Ivy Villalpando PA    20 mg/kg × 49.9 kg Intravenous Once 11/11/22 1733 11/11/22 2132        Current Diuretic Therapy:  Diuretics (From admission, onward)    None        ----------------------------------------------------------------------------------------------------------------------  Vital Signs:  Temp:  [97.2 °F (36.2 °C)-98.2 °F (36.8 °C)] 97.2 °F (36.2 °C)  Heart Rate:  [62-69] 69  Resp:  [14-20] 20  BP: (124-160)/(64-85) 140/85  SpO2:  [95 %] 95 %  on   ;   Device (Oxygen Therapy): room air  Body mass index is 20.01 kg/m².    Wt Readings from Last 3 Encounters:   11/15/22 48 kg (105 lb 14.4 oz)   11/11/22 48.1 kg (106 lb)   11/08/22 48.1 kg (106 lb)     Intake & Output (last 3 days)       11/12 0701 11/13 0700 11/13 0701 11/14 0700 11/14 0701  11/15 0700 11/15 0701 11/16 0700    P.O. 120 0 240 120    I.V. (mL/kg) 3000 (55.1) 1400 (25.7)      IV Piggyback 550 300      Total Intake(mL/kg) 3670 (67.5) 1700 (31.3) 240 (5) 120 (2.5)    Urine (mL/kg/hr) 600 (0.5) 1850 (1.4) 1800 (1.6)     Total Output 600 1850 1800     Net +3070 -150 -1560 +120            Urine Unmeasured Occurrence 1 x 2 x 4 x 2 x        Diet Pureed (NDD 1); Regular Consistency  ----------------------------------------------------------------------------------------------------------------------  Physical exam:  Constitutional: Elderly, demented female, resting comfortably in bed, no acute distress.      HENT:  Head:  Normocephalic and atraumatic.  Mouth:  Moist mucous membranes.    Eyes:  Conjunctivae and EOM are normal. No scleral icterus.   Cardiovascular:  Normal rate, regular rhythm and normal heart sounds with no murmur. No JVD.   Pulmonary/Chest:  No respiratory distress, no wheezes, no crackles, with normal breath sounds and good air movement. Unlabored. No accessory muscle  use.  Abdominal:  Soft. No distension and no tenderness.  Bowel sounds present. No rebound or guarding.   Musculoskeletal:  No tenderness, and no deformity.  No red or swollen joints anywhere.    Neurological:  Alert and oriented to person only.  No cranial nerve deficit.   Nonfocal.   Skin:  Skin is warm and dry. No rash noted. No pallor.   Peripheral vascular:  No clubbing, no cyanosis, no edema. Pedal and tibial pulses 2 out of 4 bilaterally.     Unchanged from 11/14/2022  Electronically signed by Gareth Mendez DO, 11/15/22, 1:22 PM EST.    ----------------------------------------------------------------------------------------------------------------------  Results from last 7 days   Lab Units 11/15/22  0203 11/13/22  0805 11/12/22  0347 11/11/22  1938 11/11/22  1646   CRP mg/dL  --   --  2.85*  --  3.92*   LACTATE mmol/L  --   --   --  1.1 3.2*   WBC 10*3/mm3 8.97 9.94 13.65*  --  7.76   HEMOGLOBIN g/dL 10.9* 10.7* 10.4*  --  12.0   HEMATOCRIT % 33.2* 35.6 32.7*  --  37.6   MCV fL 90.5 99.7* 93.2  --  92.2   MCHC g/dL 32.8 30.1* 31.8  --  31.9   PLATELETS 10*3/mm3 256 248 248  --  309         Results from last 7 days   Lab Units 11/15/22  0203 11/13/22  0805 11/12/22 2053 11/12/22 0347 11/11/22  1646 11/08/22  2111   SODIUM mmol/L 134* 145  --  142 140 136   POTASSIUM mmol/L 3.2* 4.8 4.5 3.4* 3.7 4.4   CHLORIDE mmol/L 104 118*  --  111* 105 101   CO2 mmol/L 18.9* 18.2*  --  19.0* 18.6* 22.6   BUN mg/dL 9 16  --  37* 46* 25*   CREATININE mg/dL 0.43* 0.59  --  0.99 1.28* 1.05*   CALCIUM mg/dL 8.2 8.1*  --  8.0* 9.3 8.9   GLUCOSE mg/dL 88 88  --  156* 143* 111*   ALBUMIN g/dL  --   --   --  2.52* 3.29* 3.26*   BILIRUBIN mg/dL  --   --   --  0.2 0.2 0.4   ALK PHOS U/L  --   --   --  67 88 73   AST (SGOT) U/L  --   --   --  22 24 16   ALT (SGPT) U/L  --   --   --  10 10 <5   Estimated Creatinine Clearance: 63.3 mL/min (A) (by C-G formula based on SCr of 0.43 mg/dL (L)).  No results found for: AMMONIA  Results  from last 7 days   Lab Units 11/11/22  1938 11/11/22  1646 11/08/22  2111   CK TOTAL U/L  --   --  48   TROPONIN T ng/mL <0.010 <0.010  --      Results from last 7 days   Lab Units 11/11/22  1646   PROBNP pg/mL 1,369.0         No results found for: HGBA1C, POCGLU  Lab Results   Component Value Date    TSH 4.850 (H) 11/11/2022    FREET4 0.72 (L) 11/11/2022     No results found for: PREGTESTUR, PREGSERUM, HCG, HCGQUANT  Pain Management Panel     Pain Management Panel Latest Ref Rng & Units 8/5/2015    AMPHETAMINES SCREEN, URINE NEGATIVE Negative    BARBITURATES SCREEN NEGATIVE Negative    BENZODIAZEPINE SCREEN, URINE NEGATIVE Negative    COCAINE SCREEN, URINE NEGATIVE Negative    METHADONE SCREEN, URINE NEGATIVE Negative        Brief Urine Lab Results  (Last result in the past 365 days)      Color   Clarity   Blood   Leuk Est   Nitrite   Protein   CREAT   Urine HCG        11/11/22 1951 Dark Yellow   Clear   Trace   Small (1+)   Negative   Negative               Blood Culture   Date Value Ref Range Status   11/11/2022 No growth at 2 days  Preliminary   11/11/2022 No growth at 2 days  Preliminary     Urine Culture   Date Value Ref Range Status   11/11/2022 <10,000 CFU/mL Normal Urogenital Amira  Final     No results found for: WOUNDCX  No results found for: STOOLCX  No results found for: RESPCX  No results found for: AFBCX  Results from last 7 days   Lab Units 11/12/22  0347 11/11/22 1938 11/11/22  1646   PROCALCITONIN ng/mL  --   --  0.09   LACTATE mmol/L  --  1.1 3.2*   CRP mg/dL 2.85*  --  3.92*       I have personally looked at the labs and they are summarized above.  ----------------------------------------------------------------------------------------------------------------------  Detailed radiology reports for the last 24 hours:  Imaging Results (Last 24 Hours)     ** No results found for the last 24 hours. **        Assessment & Plan      Patient is a 89-year-old female with history significant for advanced  dementia, CAD status post CABG, Parkinson's disease who presented to the ER with altered mental status and was diagnosed with left lower lobe pneumonia.    #Sepsis due to left lower lobe pneumonia  #Metabolic encephalopathy  --Patient presented encephalopathic and was diagnosed with left lower lobe pneumonia.,  Hypotensive  --Initial admit labs noted leukocytosis with left shift, BUN/creatinine 46/1.3, Pro-Kris negative, CRP 3.9  --COVID-19/flu negative  --Blood cultures NGTD, MRSA negative, swab negative, sputum culture unable to be produced  --CT chest with mild left basilar infiltrates  --No evidence for aspiration per speech  --Patient has medically improved, leukocytosis has resolved and remains hemodynamically stable on room air.  --Lost IV access, completed 3 days of Azactam, in order to avoid further agitation, will change to p.o. Omnicef, will check with pharmacy to ensure she has tolerated cephalosporins in the past, if not can opt for fluoroquinolone    #CAD status post three-vessel CABG  --Asymptomatic, troponins negative x2, EKG without ischemic changes  --Continue home Plavix, aspirin    #Parkinson's disease  #Advanced dementia  #Delirium  --Initial CT head negative on admission  --Continue home Zoloft, Depakote and carbidopa levodopa  --Seroquel did not seem to help, neither did as needed Klonopin.  We will start twice daily Zyprexa-ultimately her agitation will significantly improved after she is discharged from this facility    #Right wrist injury, continue brace, follow-up outpatient with Ortho  #Ascending aortic aneurysm, stable, 4 cm, follow-up outpatient  #Essential hypertension, normotensive, hold home meds  #Hyperlipidemia, continue statin  #Hypothyroidism, continue home Synthroid  #Osteoarthritis, continue as needed meds  #IBS-constipation, continue bowel regimen    CHECKLIST:  Abx: Omnicef  Steroids: None  VTE: Lovenox  GI ppx: PPI  Diet: Consistent carb  Code: No CPR, limited  Dispo: Patient  is medically improved, goals of care discussion today with palliative care.  Spoke to granddaughter at bedside, she stated she discussed with patient's daughter, they plan to pursue SNF with hospice.  Case management working on facility in [place].    Gareth Mendez DO  [facility]  11/15/22  13:19 EST

## 2022-11-16 VITALS
DIASTOLIC BLOOD PRESSURE: 77 MMHG | HEIGHT: 61 IN | HEART RATE: 66 BPM | SYSTOLIC BLOOD PRESSURE: 138 MMHG | TEMPERATURE: 98 F | BODY MASS INDEX: 19.2 KG/M2 | WEIGHT: 101.7 LBS | OXYGEN SATURATION: 95 % | RESPIRATION RATE: 16 BRPM

## 2022-11-16 LAB
ANION GAP SERPL CALCULATED.3IONS-SCNC: 12.6 MMOL/L (ref 5–15)
BACTERIA SPEC AEROBE CULT: NORMAL
BACTERIA SPEC AEROBE CULT: NORMAL
BASOPHILS # BLD AUTO: 0.07 10*3/MM3 (ref 0–0.2)
BASOPHILS NFR BLD AUTO: 0.6 % (ref 0–1.5)
BUN SERPL-MCNC: 9 MG/DL (ref 8–23)
BUN/CREAT SERPL: 17.3 (ref 7–25)
CALCIUM SPEC-SCNC: 8.8 MG/DL (ref 8.2–9.6)
CHLORIDE SERPL-SCNC: 105 MMOL/L (ref 98–107)
CO2 SERPL-SCNC: 17.4 MMOL/L (ref 22–29)
CREAT SERPL-MCNC: 0.52 MG/DL (ref 0.57–1)
DEPRECATED RDW RBC AUTO: 55.1 FL (ref 37–54)
EGFRCR SERPLBLD CKD-EPI 2021: 87.3 ML/MIN/1.73
EOSINOPHIL # BLD AUTO: 0.5 10*3/MM3 (ref 0–0.4)
EOSINOPHIL NFR BLD AUTO: 4.5 % (ref 0.3–6.2)
ERYTHROCYTE [DISTWIDTH] IN BLOOD BY AUTOMATED COUNT: 16.6 % (ref 12.3–15.4)
GLUCOSE SERPL-MCNC: 77 MG/DL (ref 65–99)
HCT VFR BLD AUTO: 38.9 % (ref 34–46.6)
HGB BLD-MCNC: 12.2 G/DL (ref 12–15.9)
IMM GRANULOCYTES # BLD AUTO: 0.09 10*3/MM3 (ref 0–0.05)
IMM GRANULOCYTES NFR BLD AUTO: 0.8 % (ref 0–0.5)
LYMPHOCYTES # BLD AUTO: 2.35 10*3/MM3 (ref 0.7–3.1)
LYMPHOCYTES NFR BLD AUTO: 21.1 % (ref 19.6–45.3)
MCH RBC QN AUTO: 29.1 PG (ref 26.6–33)
MCHC RBC AUTO-ENTMCNC: 31.4 G/DL (ref 31.5–35.7)
MCV RBC AUTO: 92.8 FL (ref 79–97)
MONOCYTES # BLD AUTO: 1.39 10*3/MM3 (ref 0.1–0.9)
MONOCYTES NFR BLD AUTO: 12.5 % (ref 5–12)
NEUTROPHILS NFR BLD AUTO: 6.73 10*3/MM3 (ref 1.7–7)
NEUTROPHILS NFR BLD AUTO: 60.5 % (ref 42.7–76)
NRBC BLD AUTO-RTO: 0 /100 WBC (ref 0–0.2)
PLATELET # BLD AUTO: 268 10*3/MM3 (ref 140–450)
PMV BLD AUTO: 11 FL (ref 6–12)
POTASSIUM SERPL-SCNC: 4 MMOL/L (ref 3.5–5.2)
RBC # BLD AUTO: 4.19 10*6/MM3 (ref 3.77–5.28)
SODIUM SERPL-SCNC: 135 MMOL/L (ref 136–145)
WBC NRBC COR # BLD: 11.13 10*3/MM3 (ref 3.4–10.8)

## 2022-11-16 PROCEDURE — 99239 HOSP IP/OBS DSCHRG MGMT >30: CPT | Performed by: STUDENT IN AN ORGANIZED HEALTH CARE EDUCATION/TRAINING PROGRAM

## 2022-11-16 PROCEDURE — 85025 COMPLETE CBC W/AUTO DIFF WBC: CPT | Performed by: STUDENT IN AN ORGANIZED HEALTH CARE EDUCATION/TRAINING PROGRAM

## 2022-11-16 PROCEDURE — 80048 BASIC METABOLIC PNL TOTAL CA: CPT | Performed by: STUDENT IN AN ORGANIZED HEALTH CARE EDUCATION/TRAINING PROGRAM

## 2022-11-16 RX ORDER — OLANZAPINE 5 MG/1
5 TABLET, ORALLY DISINTEGRATING ORAL 2 TIMES DAILY
Qty: 60 TABLET | Refills: 0 | Status: SHIPPED | OUTPATIENT
Start: 2022-11-16 | End: 2022-11-16 | Stop reason: SDUPTHER

## 2022-11-16 RX ORDER — LEVOFLOXACIN 750 MG/1
750 TABLET ORAL EVERY 24 HOURS
Qty: 4 TABLET | Refills: 0 | Status: SHIPPED | OUTPATIENT
Start: 2022-11-16 | End: 2022-11-16 | Stop reason: SDUPTHER

## 2022-11-16 RX ORDER — TRAMADOL HYDROCHLORIDE 50 MG/1
50 TABLET ORAL EVERY 6 HOURS PRN
Qty: 12 TABLET | Refills: 0 | Status: SHIPPED | OUTPATIENT
Start: 2022-11-16

## 2022-11-16 RX ORDER — TRAMADOL HYDROCHLORIDE 50 MG/1
50 TABLET ORAL EVERY 6 HOURS PRN
Qty: 12 TABLET | Refills: 0 | Status: SHIPPED | OUTPATIENT
Start: 2022-11-16 | End: 2022-11-16 | Stop reason: SDUPTHER

## 2022-11-16 RX ORDER — LEVOFLOXACIN 750 MG/1
750 TABLET ORAL EVERY 24 HOURS
Qty: 3 TABLET | Refills: 0 | Status: SHIPPED | OUTPATIENT
Start: 2022-11-16 | End: 2022-11-19

## 2022-11-16 RX ORDER — TRAMADOL HYDROCHLORIDE 50 MG/1
50 TABLET ORAL EVERY 6 HOURS PRN
Qty: 12 TABLET | Refills: 0 | Status: CANCELLED | OUTPATIENT
Start: 2022-11-16

## 2022-11-16 RX ORDER — OLANZAPINE 5 MG/1
5 TABLET, ORALLY DISINTEGRATING ORAL 2 TIMES DAILY
Qty: 60 TABLET | Refills: 0 | Status: SHIPPED | OUTPATIENT
Start: 2022-11-16 | End: 2022-12-16

## 2022-11-16 RX ORDER — DOXYCYCLINE 100 MG/1
100 CAPSULE ORAL EVERY 12 HOURS SCHEDULED
Qty: 5 CAPSULE | Refills: 0 | Status: SHIPPED | OUTPATIENT
Start: 2022-11-16 | End: 2022-11-19

## 2022-11-16 RX ORDER — DOXYCYCLINE 100 MG/1
100 CAPSULE ORAL EVERY 12 HOURS SCHEDULED
Qty: 5 CAPSULE | Refills: 0 | Status: SHIPPED | OUTPATIENT
Start: 2022-11-16 | End: 2022-11-16 | Stop reason: SDUPTHER

## 2022-11-16 RX ORDER — TRAMADOL HYDROCHLORIDE 50 MG/1
50 TABLET ORAL EVERY 6 HOURS PRN
Qty: 12 TABLET | Refills: 0 | OUTPATIENT
Start: 2022-11-16

## 2022-11-16 RX ORDER — CHOLECALCIFEROL (VITAMIN D3) 125 MCG
10 CAPSULE ORAL NIGHTLY
Qty: 60 TABLET | Refills: 0 | Status: SHIPPED | OUTPATIENT
Start: 2022-11-16 | End: 2022-11-16 | Stop reason: SDUPTHER

## 2022-11-16 RX ORDER — CHOLECALCIFEROL (VITAMIN D3) 125 MCG
10 CAPSULE ORAL NIGHTLY
Qty: 60 TABLET | Refills: 0 | Status: SHIPPED | OUTPATIENT
Start: 2022-11-16 | End: 2022-12-16

## 2022-11-16 RX ADMIN — DOXYCYCLINE 100 MG: 100 CAPSULE ORAL at 09:34

## 2022-11-16 RX ADMIN — LEVOTHYROXINE SODIUM 50 MCG: 50 TABLET ORAL at 09:32

## 2022-11-16 RX ADMIN — Medication 1 TABLET: at 09:32

## 2022-11-16 RX ADMIN — DOCUSATE SODIUM 200 MG: 100 CAPSULE ORAL at 09:33

## 2022-11-16 RX ADMIN — CLOPIDOGREL 75 MG: 75 TABLET, FILM COATED ORAL at 09:34

## 2022-11-16 RX ADMIN — PANTOPRAZOLE SODIUM 40 MG: 40 TABLET, DELAYED RELEASE ORAL at 09:32

## 2022-11-16 RX ADMIN — SENNOSIDES 1 TABLET: 8.6 TABLET, FILM COATED ORAL at 09:32

## 2022-11-16 RX ADMIN — ASPIRIN 81 MG: 81 TABLET, COATED ORAL at 09:33

## 2022-11-16 RX ADMIN — CETIRIZINE HYDROCHLORIDE 10 MG: 10 TABLET, FILM COATED ORAL at 09:32

## 2022-11-16 RX ADMIN — OLANZAPINE 5 MG: 5 TABLET, ORALLY DISINTEGRATING ORAL at 09:34

## 2022-11-16 RX ADMIN — CARBIDOPA AND LEVODOPA 1 TABLET: 10; 100 TABLET ORAL at 09:33

## 2022-11-16 NOTE — CASE MANAGEMENT/SOCIAL WORK
Discharge Planning Assessment  Psychiatric     Patient Name: Marilyn Sood  MRN: [id number]  Today's Date: 11/16/2022    Admit Date: 11/11/2022     Discharge Plan     Row Name 11/16/22 0925       Plan    Final Discharge Disposition Code 03 - skilled nursing facility (SNF)    Final Note Pt is being discharged to [facility] with [facility]. SS notified [facility] per Martita and faxed clinicals. RN to call report to [facility]. SS notified [facility] per Gabriel and faxed AVS. RN to call report to [facility]. SS completed EMS PCS form and will arrange transport once pt is ready. SS notified pt's daughter, Eri Gray and she voices agreement to discharge arrangements. SS to follow.    11:40: SS contacted [facility] to arrange [facility]. No other needs identified.              Continued Care and Services - Admitted Since 11/11/2022     Destination     Service Provider Request Status Selected Services Address Phone Fax Patient Preferred    [facility] N[facility]A [address] [id number] [phone] --          Home Medical Care     Service Provider Request Status Selected Services Address Phone Fax Patient Preferred    [facility] [address] [place] [phone] --              Expected Discharge Date and Time     Expected Discharge Date Expected Discharge Time    Nov 16, 2022       JI Hurd

## 2022-11-16 NOTE — DISCHARGE PLACEMENT REQUEST
"Marilyn Sood (89 y.o. Female)     Date of Birth   04/14/1930    Social Security Number       Address   [address] [place] [id number]    Home Phone   [phone]    MRN   [id number]       Orthodoxy   None    Marital Status                               Admission Date   11/11/22    Admission Type   Emergency    Admitting Provider   Tyrone Betancourt MD    Attending Provider   Gareth Mendez DO    Department, Room/Bed   [address], 3344/1S       Discharge Date       Discharge Disposition   Skilled Nursing Facility (DC - External)    Discharge Destination                               Attending Provider: Gareth Mendez DO    Allergies: Penicillins, Reglan [Metoclopramide], Sulfa Antibiotics    Isolation: None   Infection: COVID (rule out) (11/15/22)   Code Status: No CPR    Ht: 154.9 cm (61\")   Wt: 46.1 kg (101 lb 11.2 oz)    Admission Cmt: None   Principal Problem: Severe sepsis (HCC) [A41.9,R65.20]                 Active Insurance as of 11/11/2022     Primary Coverage     Payor Plan Insurance Group Employer/Plan Group    [facility] MEDICARE REPLACEMENT [facility] MEDICARE REPLACEMENT [id number]     Payor Plan Address Payor Plan Phone Number Payor Plan Fax Number Effective Dates    [address] [address]   8/1/2021 - None Entered    [facility] [address]       Subscriber Name Subscriber Birth Date Member ID       MARILYN SOOD 4/14/1930 [id number]           Secondary Coverage     Payor Plan Insurance Group Employer/Plan Group    [place]      Payor Plan Address Payor Plan Phone Number Payor Plan Fax Number Effective Dates    [address] [phone]  11/11/2022 - None Entered    [place] [address]       Subscriber Name Subscriber Birth Date Member ID       MARILYN SOOD 4/14/1930 [id number]                 Emergency Contacts      (Rel.) Home Phone Work Phone Mobile Phone    Eri Gray " (Daughter) [phone] -- --    ALMITA LAMB (Grandchild) [phone] -- --    CROWDERRODNEY BROOKS (Grandchild) [phone] -- --        COVID-19 RAPID AG,VERITOR,COR/DAISY/PAD/VANESSA/MAD/JEREMY/LAG/FLORIAN/ IN-HOUSE,DRY SWAB, 1-2 HR TAT - Swab, Nasal Cavity [[id number]] (Order [id number])  Order  Date: 11/15/2022 Department: [address] Released By/Authorizing: Gareth Mendez DO (auto-released)     Reprint Order Requisition    COVID-19 RAPID AG,VERITOR,COR/DAISY/PAD/VANESSA/MAD/JEREMY/LAG/FLORIAN/ IN-HOUSE,DRY SWAB, 1-2 HR TAT - Swab, Nasal Cavity (Order #[id number]) on 11/15/22        COVID-19 RAPID AG,VERITOR,COR/DAISY/PAD/VANESSA/MAD/JEREMY/LAG/FLORIAN/ IN-HOUSE,DRY SWAB, 1-2 HR TAT - Swab, Nasal Cavity  Order: [id number]   Status: Final result      Visible to patient: No (scheduled for 11/16/2022  8:23 AM)      Next appt: None     Specimen Information: Nasal Cavity; Swab    0 Result Notes  Component Ref Range & Units    COVID19 Presumptive Negative - Ref. Range Presumptive Negative    Resulting Agency   [facility]           Narrative  Performed by:  [facility]  Fact sheets for providers: https://www.fda.gov/media/477075/download     Fact sheets for patients: https://www.fda.gov/media/020445/download      Specimen Collected: 11/15/22 17:56 EST Last Resulted: 11/15/22 18:23 EST        Order Details      View Encounter      Lab and Collection Details      Routing      Result History     View Encounter Conversation           Result Care Coordination      Patient Communication     11/16/2022  8:23 AM   Not seen Back to Top           Order Questions    Question Answer   Previously tested for COVID-19? Yes   Note: Question from the  Department of Health and Human Services based on the CARES Act.   Employed in healthcare setting? No   Note: First responders, front line clinicians, nursing home staff, environmental staff, or therapists in direct contact with patients.  Question required by  Department of Health and Human Services based  on the CARES Act.   Symptomatic for COVID-19 as defined by CDC? No   Note: Fever or chills, Cough, Shortness of breath or difficulty breathing, Fatigue, Muscle or body aches, Headache, New loss of taste or smell, Sore throat, Congestion or runny nose, Nausea or vomiting, Diarrhea. Question required by [place] Department of Health and Human Services based on CARES Act   Hospitalized for COVID-19? No   Note: Question from the [place] Department of Health and Human Services based on the CARES Act.   Admitted to ICU for COVID-19? No   Note: Question from the [place] Department of Health and Human Services based on the CARES Act.   Resident in a congregate (group) care setting? No   Note: Nursing home, residential care location for people with intellectual and developmental disabilities, psychiatric treatment facility, group home, dormitory, board and care home, homeless shelter, foster care or other setting. Question required by US Department of Health and Human Services based on the CARES Act.   Pregnant? No   Note: Question required by [place] Department of Health and Human Services based on the CARES Act.   Release to patient Routine Release                Provider Comment To Patient     11/16/2022  8:23 AM   Not seen       Result Read / Acknowledged    Acknowledge result  No acknowledgement history exists for this order.         Lab Component SmartPhrase Guide    COVID-19 RAPID AG,VERITOR,COR/DAISY/PAD/VANESSA/MAD/JEREMY/LAG/FLORIAN/ IN-HOUSE,DRY SWAB, 1-2 HR TAT - Swab, Nasal Cavity (Order #[id number]) on 11/15/22       Other Results from 11/11/2022     Basic Metabolic Panel  Final result 11/16/2022    CBC Auto Differential  Final result 11/16/2022    Potassium  Final result 11/15/2022    Basic Metabolic Panel  Final result 11/15/2022    CBC Auto Differential  Final result 11/15/2022    Basic Metabolic Panel  Final result 11/13/2022    CBC Auto Differential  Final result 11/13/2022    Potassium  Final result 11/12/2022    MRSA Screen, PCR  (Inpatient) - Swab, Nares  Final result 11/12/2022    CBC Auto Differential  Final result 11/12/2022    Comprehensive Metabolic Panel  Final result 11/12/2022    C-reactive Protein  Final result 11/12/2022    Urinalysis With Microscopic If Indicated (No Culture) - Urine, Catheter In/Out  Final result 11/11/2022    Urinalysis, Microscopic Only - Urine, Catheter In/Out  Final result 11/11/2022    Urine Culture - Urine, Urine, Catheter In/Out  Final result 11/11/2022    Troponin  Final result 11/11/2022    STAT Lactic Acid, Reflex  Final result 11/11/2022    T4, Free  Final result 11/11/2022    TSH  Final result 11/11/2022    COVID-19 and FLU A/B PCR - Swab, Nasopharynx  Final result 11/11/2022    important suggestion  Warning: Additional results from 11/11/2022 are available but are not displayed in this report.               Current Facility-Administered Medications   Medication Dose Route Frequency Provider Last Rate Last Admin   • acetaminophen (TYLENOL) tablet 500 mg  500 mg Oral Q6H PRN Gareth Mendez DO       • aspirin EC tablet 81 mg  81 mg Oral Daily Gareth Mendez DO   81 mg at 11/15/22 0856   • bisacodyl (DULCOLAX) suppository 10 mg  10 mg Rectal Daily PRN Gareth Mendez DO       • carbidopa-levodopa (SINEMET)  MG per tablet 1 tablet  1 tablet Oral BID Gareth Mendez DO   1 tablet at 11/15/22 2036   • cetirizine (zyrTEC) tablet 10 mg  10 mg Oral Daily Gareth Mendez DO   10 mg at 11/15/22 0856   • clonazePAM (KlonoPIN) tablet 0.25 mg  0.25 mg Oral BID PRN Gareth Mendez DO   0.25 mg at 11/15/22 2356   • clopidogrel (PLAVIX) tablet 75 mg  75 mg Oral Daily Gareth Mendez DO   75 mg at 11/15/22 0856   • divalproex (DEPAKOTE) DR tablet 250 mg  250 mg Oral Nightly Gareth Mendez DO   250 mg at 11/15/22 2036   • docusate sodium (COLACE) capsule 200 mg  200 mg Oral Daily Gareth Mendez DO   200 mg at 11/15/22  0856   • doxycycline (MONODOX) capsule 100 mg  100 mg Oral Q12H Gareth Mendez, DO   100 mg at 11/15/22 2036   • Enoxaparin Sodium (LOVENOX) syringe 40 mg  40 mg Subcutaneous Nightly Gareth Mendez, DO   40 mg at 11/15/22 2036   • levoFLOXacin (LEVAQUIN) tablet 750 mg  750 mg Oral Q24H Gareth Mendez DO   750 mg at 11/15/22 1516   • levothyroxine (SYNTHROID, LEVOTHROID) tablet 50 mcg  50 mcg Oral Daily Gareth Mendez DO   50 mcg at 11/15/22 0856   • melatonin tablet 5 mg  5 mg Oral Nightly PRN Janet Parker PA-C   5 mg at 11/15/22 2036   • multivitamin (THERAGRAN) tablet 1 tablet  1 tablet Oral Daily Gareth Mendez DO   1 tablet at 11/15/22 0856   • nitroglycerin (NITROSTAT) SL tablet 0.4 mg  0.4 mg Sublingual Q5 Min PRN Gareth Mendez DO       • OLANZapine zydis (zyPREXA) disintegrating tablet 5 mg  5 mg Oral BID Gareth Mendez DO   5 mg at 11/15/22 2036   • pantoprazole (PROTONIX) EC tablet 40 mg  40 mg Oral Daily Gareth Mendez DO   40 mg at 11/15/22 0856   • potassium chloride ER (K-TAB) ER tablet 40 mEq  40 mEq Oral PRN Gareth Mendez DO        Or   • potassium chloride (KLOR-CON) packet 40 mEq  40 mEq Oral PRN Gareth Mendez DO        Or   • potassium chloride 10 mEq in 100 mL IVPB  10 mEq Intravenous Q1H PRN Gareth Mendez DO       • senna (SENOKOT) tablet 1 tablet  1 tablet Oral Daily Gareth Mendez DO   1 tablet at 11/15/22 0856   • senna (SENOKOT) tablet 1 tablet  1 tablet Oral Daily PRN Gareth Mendez DO       • sertraline (ZOLOFT) tablet 50 mg  50 mg Oral Nightly Gareth Mendez DO   50 mg at 11/15/22 2036   • sodium chloride 0.9 % flush 10 mL  10 mL Intravenous PRN Gareth Mendez,        • sodium chloride 0.9 % flush 10 mL  10 mL Intravenous Q12H Gareth Mendez,    10 mL at 11/14/22 2680   • sodium chloride 0.9 % flush 10 mL   10 mL Intravenous PRN Gareth Mendez,        • traMADol (ULTRAM) tablet 50 mg  50 mg Oral Q12H PRN Gareth Mendez,    50 mg at 11/14/22 1715     Discharge Order (From admission, onward)     Start     Ordered    11/16/22 0759  Discharge patient  Once        Expected Discharge Date: 11/16/22    Discharge Disposition: Skilled Nursing Facility (DC - External)    Physician of Record for Attribution - Please select from Treatment Team: GARETH MENDEZ [[id number]]    Review needed by CMO to determine Physician of Record: No       Question Answer Comment   Physician of Record for Attribution - Please select from Treatment Team GARETH MENDEZ    Review needed by CMO to determine Physician of Record No        11/16/22 0843

## 2022-11-16 NOTE — PLAN OF CARE
Goal Outcome Evaluation:  Plan of Care Reviewed With: patient           Outcome Evaluation: Pt alert and confused. Pt trying to get up to leave. Pt recieved PRN medication per orders. Pt slept occasionally through night. VSS. No acute changes this shift. Will continue with plan of care.

## 2022-11-16 NOTE — DISCHARGE SUMMARY
[facility] DISCHARGE SUMMARY    Patient Identification:  Name:  Marilyn Sood  Age:  89 y.o.  Sex:  female  :  1930  MRN:  [id number]  Visit Number:  [id number]    Date of Admission: 2022  Date of Discharge:  2022     PCP: Jarod Burnett MD    DISCHARGE DIAGNOSIS  #Sepsis due to left lower lobe pneumonia  #Metabolic encephalopathy  #CAD status post three-vessel CABG  #Parkinson's disease  #Advanced dementia  #Delirium  #Right wrist injury  #Ascending aortic aneurysm, stable  #Essential hypertension  #Hyperlipidemia  #Hypothyroidism  #Osteoarthritis  #IBS-constipation    CONSULTS   none    PROCEDURES PERFORMED  none    HOSPITAL COURSE  Patient is a 89 y.o. female presented to [facility] with encephalopathy in the setting of advanced parkinsonian dementia.  Please see the admitting history and physical for further details.  Patient was admitted and diagnosed with left lower lobe pneumonia.  She was initially hypotensive and consideration to initiate vasopressors were discussed with family who made patient DNR/DNI, did not want further aggressive interventions/procedures and did not want a central line.  She was treated conservatively with IV fluid resuscitation and IV antibiotics with improvement of hemodynamics.  Cultures remain negative.  She became agitated, confused and delirious, often sundowning in the evenings.  Given her continued decline and lack of quality of life with dementia, family ultimately opted to return to nursing facility in [place] which was closer to them and proceed with hospice at the facility.  From a dementia standpoint, she was started on Zyprexa for agitation which did improve after initiation.  I think a lot of the agitation was more from delirium and expect this to significantly improve after discharge from this facility.  In the future, Zyprexa may be discontinued if felt unnecessary.    At the time of  discharge, patient was hemodynamically stable, daughter was present at bedside and agreeable to treatment plan.  She was discharged on p.o. antibiotics to complete course.  She was discharged tomorrow with [facility] with hospice.    VITAL SIGNS:  Temp:  [96.8 °F (36 °C)-98 °F (36.7 °C)] 98 °F (36.7 °C)  Heart Rate:  [66-68] 66  Resp:  [16-20] 16  BP: (134-142)/(77-90) 138/77     on   ;   Device (Oxygen Therapy): room air    Body mass index is 19.22 kg/m².  Wt Readings from Last 3 Encounters:   11/16/22 46.1 kg (101 lb 11.2 oz)   11/11/22 48.1 kg (106 lb)   11/08/22 48.1 kg (106 lb)       PHYSICAL EXAM:  Constitutional: Elderly, demented female, resting comfortably in bed, no acute distress.      HENT:  Head:  Normocephalic and atraumatic.  Mouth:  Moist mucous membranes.    Eyes:  Conjunctivae and EOM are normal. No scleral icterus.   Cardiovascular:  Normal rate, regular rhythm and normal heart sounds with no murmur. No JVD.   Pulmonary/Chest:  No respiratory distress, no wheezes, no crackles, with normal breath sounds and good air movement. Unlabored. No accessory muscle use.  Abdominal:  Soft. No distension and no tenderness.  Bowel sounds present.  Musculoskeletal:  No tenderness, and no deformity.  No red or swollen joints anywhere.    Neurological:  Alert and oriented to person only. Nonfocal.   Skin:  Skin is warm and dry. No rash noted. No pallor.   Peripheral vascular:  No clubbing, no cyanosis, no edema. Pedal and tibial pulses 2 out of 4 bilaterally.     DISCHARGE DISPOSITION   Stable    DISCHARGE MEDICATIONS:     Discharge Medications      New Medications      Instructions Start Date   doxycycline 100 MG capsule  Commonly known as: MONODOX   100 mg, Oral, Every 12 Hours Scheduled      levoFLOXacin 750 MG tablet  Commonly known as: LEVAQUIN   750 mg, Oral, Every 24 Hours      melatonin 5 MG tablet tablet   10 mg, Oral, Nightly      OLANZapine zydis 5 MG disintegrating tablet  Commonly  known as: zyPREXA   5 mg, Translingual, 2 Times Daily         Changes to Medications      Instructions Start Date   senna 8.6 MG tablet  Commonly known as: SENOKOT  What changed: Another medication with the same name was removed. Continue taking this medication, and follow the directions you see here.   1 tablet, Oral, Daily      traMADol 50 MG tablet  Commonly known as: ULTRAM  What changed: reasons to take this   50 mg, Oral, Every 6 Hours PRN         Continue These Medications      Instructions Start Date   acetaminophen 500 MG tablet  Commonly known as: TYLENOL   500 mg, Oral, Every 6 Hours PRN      amLODIPine 2.5 MG tablet  Commonly known as: NORVASC   2.5 mg, Oral, Daily      aspirin 81 MG EC tablet   81 mg, Oral, Daily      bisacodyl 10 MG suppository  Commonly known as: DULCOLAX   10 mg, Rectal, Daily PRN      carbidopa-levodopa  MG per tablet  Commonly known as: SINEMET   1 tablet, Oral, 2 Times Daily      clopidogrel 75 MG tablet  Commonly known as: PLAVIX   75 mg, Oral, Daily      divalproex 250 MG DR tablet  Commonly known as: DEPAKOTE   250 mg, Nightly      docusate calcium 240 MG capsule  Commonly known as: SURFAK   240 mg, Oral, Daily      ENEMA RE   1 applicator, Rectal, Daily PRN      levothyroxine 50 MCG tablet  Commonly known as: SYNTHROID, LEVOTHROID   50 mcg, Oral, Daily      Linzess 145 MCG capsule capsule  Generic drug: linaclotide   145 mcg, Oral, Every Morning Before Breakfast      loratadine 10 MG tablet  Commonly known as: CLARITIN   10 mg, Oral, Daily      multivitamin tablet tablet   1 tablet, Oral, Daily      pantoprazole 40 MG EC tablet  Commonly known as: PROTONIX   40 mg, Oral, Daily      sertraline 50 MG tablet  Commonly known as: ZOLOFT   50 mg, Oral, Nightly         Stop These Medications    carvedilol 25 MG tablet  Commonly known as: COREG     torsemide 5 MG tablet  Commonly known as: DEMADEX              Additional Instructions for the Follow-ups that You Need to Schedule      Discharge Follow-up with PCP   As directed       Currently Documented PCP:    Jarod Burnett MD    PCP Phone Number:    [phone]     Follow Up Details: [address] [place] [address] fu            Follow-up Information     Jarod Burnett MD .    Specialty: Family Medicine  Why: [address] [place] [address] fu  Contact information:  [address] [place] [address] [address] [place] [address] [phone]                          TEST  RESULTS PENDING AT DISCHARGE  Pending Labs     Order Current Status    Blood Culture - Blood, Arm, Left Preliminary result    Blood Culture - Blood, Arm, Right Preliminary result           CODE STATUS  Code Status and Medical Interventions:   Ordered at: 11/12/22 0144     Medical Intervention Limits:    NO intubation (DNI)     Level Of Support Discussed With:    Health Care Surrogate     Code Status (Patient has no pulse and is not breathing):    No CPR (Do Not Attempt to Resuscitate)     Medical Interventions (Patient has pulse or is breathing):    Limited Support     Comments:    no central line; if vasopressors given, could only be given short term through peripheral line       The ASCVD Risk score (Mariann MCDONALD, et al., 2019) failed to calculate for the following reasons:    The 2019 ASCVD risk score is only valid for ages 40 to 79     Gareth Mendez DO  [facility]  11/16/22  08:03 EST    Please note that this discharge summary required more than 30 minutes to complete.

## 2022-11-22 ENCOUNTER — TELEPHONE (OUTPATIENT)
Dept: ORTHOPEDIC SURGERY | Facility: CLINIC | Age: 87
End: 2022-11-22

## 2022-11-22 NOTE — TELEPHONE ENCOUNTER
Spoke with patients daughter, Eri, the patient's health is declining and has been placed with hospice. I called to schedule a follow up, daughter doesn't know if she will be able to get out but will try and will let me know if I need to get involved in arranging transportation. I asked if she knows that she has been wearing her brace and she said that the nursing home keeps it on her. She has been scheduled to come in, verbalized understanding.

## 2022-12-12 ENCOUNTER — TELEPHONE (OUTPATIENT)
Dept: ORTHOPEDIC SURGERY | Facility: CLINIC | Age: 87
End: 2022-12-12

## 2022-12-12 NOTE — TELEPHONE ENCOUNTER
Caller: Eri Gray    Relationship: Emergency Contact    Best call back number:     What is the best time to reach you: ANY     Who are you requesting to speak with (clinical staff, provider,  specific staff member): CLINICAL    What was the call regarding: PATIENT IS IN HOSPICE AND WANTS TO MAKE SURE WE RECEIVED IT    Do you require a callback: YES

## 2022-12-13 NOTE — TELEPHONE ENCOUNTER
Spoke with patients daughter who was wondering if the patient should come in or not and if Dr. Bell had reviewed the CT. Spoke with Dr. Bell and the CT was non-diagnostic due to patient movement and artifact. Advised Eri that per Dr. Bell the patient does not need to come in for an appointment and gave her the results of the CT. Eri verbalized understanding.